# Patient Record
Sex: MALE | Race: WHITE | NOT HISPANIC OR LATINO | Employment: OTHER | URBAN - METROPOLITAN AREA
[De-identification: names, ages, dates, MRNs, and addresses within clinical notes are randomized per-mention and may not be internally consistent; named-entity substitution may affect disease eponyms.]

---

## 2017-01-27 ENCOUNTER — TELEPHONE (OUTPATIENT)
Dept: ENDOSCOPY | Facility: HOSPITAL | Age: 52
End: 2017-01-27

## 2017-01-27 DIAGNOSIS — Z12.11 SPECIAL SCREENING FOR MALIGNANT NEOPLASMS, COLON: Primary | ICD-10-CM

## 2017-01-27 RX ORDER — POLYETHYLENE GLYCOL 3350, SODIUM SULFATE ANHYDROUS, SODIUM BICARBONATE, SODIUM CHLORIDE, POTASSIUM CHLORIDE 236; 22.74; 6.74; 5.86; 2.97 G/4L; G/4L; G/4L; G/4L; G/4L
4 POWDER, FOR SOLUTION ORAL ONCE
Qty: 4000 ML | Refills: 0 | Status: SHIPPED | OUTPATIENT
Start: 2017-01-27 | End: 2017-01-27

## 2017-01-27 NOTE — TELEPHONE ENCOUNTER
Patient is scheduled for Colonoscopy 2/23/2017 with Dr. Dimas.  Instructions sent via mail.  Prep used: PEG.

## 2017-02-23 ENCOUNTER — ANESTHESIA (OUTPATIENT)
Dept: ENDOSCOPY | Facility: HOSPITAL | Age: 52
End: 2017-02-23
Payer: MEDICARE

## 2017-02-23 ENCOUNTER — SURGERY (OUTPATIENT)
Age: 52
End: 2017-02-23

## 2017-02-23 ENCOUNTER — ANESTHESIA EVENT (OUTPATIENT)
Dept: ENDOSCOPY | Facility: HOSPITAL | Age: 52
End: 2017-02-23
Payer: MEDICARE

## 2017-02-23 VITALS — RESPIRATION RATE: 16 BRPM

## 2017-02-23 PROBLEM — Z13.9 SCREENING: Status: ACTIVE | Noted: 2017-02-23

## 2017-02-23 PROCEDURE — 25000003 PHARM REV CODE 250: Performed by: NURSE ANESTHETIST, CERTIFIED REGISTERED

## 2017-02-23 PROCEDURE — D9220A PRA ANESTHESIA: Mod: PT,CRNA,, | Performed by: NURSE ANESTHETIST, CERTIFIED REGISTERED

## 2017-02-23 PROCEDURE — 63600175 PHARM REV CODE 636 W HCPCS: Performed by: NURSE ANESTHETIST, CERTIFIED REGISTERED

## 2017-02-23 PROCEDURE — D9220A PRA ANESTHESIA: Mod: PT,ANES,, | Performed by: ANESTHESIOLOGY

## 2017-02-23 RX ORDER — PROPOFOL 10 MG/ML
VIAL (ML) INTRAVENOUS
Status: DISCONTINUED | OUTPATIENT
Start: 2017-02-23 | End: 2017-02-23

## 2017-02-23 RX ORDER — PROPOFOL 10 MG/ML
VIAL (ML) INTRAVENOUS CONTINUOUS PRN
Status: DISCONTINUED | OUTPATIENT
Start: 2017-02-23 | End: 2017-02-23

## 2017-02-23 RX ORDER — LIDOCAINE HCL/PF 100 MG/5ML
SYRINGE (ML) INTRAVENOUS
Status: DISCONTINUED | OUTPATIENT
Start: 2017-02-23 | End: 2017-02-23

## 2017-02-23 RX ADMIN — PROPOFOL 100 MG: 10 INJECTION, EMULSION INTRAVENOUS at 08:02

## 2017-02-23 RX ADMIN — LIDOCAINE HYDROCHLORIDE 100 MG: 20 INJECTION, SOLUTION INTRAVENOUS at 08:02

## 2017-02-23 RX ADMIN — PROPOFOL 150 MCG/KG/MIN: 10 INJECTION, EMULSION INTRAVENOUS at 08:02

## 2017-02-23 NOTE — ANESTHESIA PREPROCEDURE EVALUATION
02/23/2017  Stevenson Robison is a 52 y.o., male.    [facility] Anesthesia Evaluation    I have reviewed the Patient Summary Reports.     I have reviewed the Medications.     Review of Systems  Anesthesia Hx:  No problems with previous Anesthesia  History of prior surgery of interest to airway management or planning:   Social:  Former Smoker    Cardiovascular:   Hypertension    Hepatic/GI:  Hepatic/GI Normal    Neurological:  Neurology Normal    Endocrine:  Endocrine Normal        Physical Exam  General:  Well nourished    Airway/Jaw/Neck:  Airway Findings: Mouth Opening: Small, but > 3cm Tongue: Normal  General Airway Assessment: Adult  Mallampati: III  Improves to III with phonation.  TM Distance: Normal, at least 6 cm      Dental:  Dental Findings: In tact        Mental Status:  Mental Status Findings:  Cooperative, Alert and Oriented         Anesthesia Plan  Type of Anesthesia, risks & benefits discussed:  Anesthesia Type:  general  Patient's Preference:   Intra-op Monitoring Plan: standard ASA monitors  Intra-op Monitoring Plan Comments:   Post Op Pain Control Plan:   Post Op Pain Control Plan Comments:   Induction:   IV  Beta Blocker:  Patient is not currently on a Beta-Blocker (No further documentation required).       Informed Consent: Patient understands risks and agrees with Anesthesia plan.  Questions answered. Anesthesia consent signed with patient.  ASA Score: 2     Day of Surgery Review of History & Physical:    H&P update referred to the surgeon.         Ready For Surgery From Anesthesia Perspective.

## 2017-02-23 NOTE — ANESTHESIA POSTPROCEDURE EVALUATION
"Anesthesia Post Evaluation    Patient: Stevenson Robison    Procedure(s) Performed: Procedure(s) (LRB):  COLONOSCOPY (N/A)    Final Anesthesia Type: general  Patient location during evaluation: GI PACU  Patient participation: Yes- Able to Participate  Level of consciousness: awake and alert  Post-procedure vital signs: reviewed and stable  Pain management: adequate  Airway patency: patent  PONV status at discharge: No PONV  Anesthetic complications: no      Cardiovascular status: stable  Respiratory status: unassisted and spontaneous ventilation  Hydration status: euvolemic  Follow-up not needed.        Visit Vitals    /80 (BP Location: Left arm, Patient Position: Sitting, BP Method: Automatic)    Pulse 74    Temp 37.1 °C (98.7 °F) (Oral)    Resp 12    Ht 5' 9" (1.753 m)    Wt 112.5 kg (248 lb)    SpO2 98%    BMI 36.62 kg/m2       Pain/Sudhakar Score: Pain Assessment Performed: Yes (2/23/2017  9:47 AM)  Presence of Pain: denies (2/23/2017  9:47 AM)  Pain Rating Prior to Med Admin: 0 (2/23/2017  9:47 AM)  Sudhakar Score: 10 (2/23/2017  9:47 AM)      "

## 2017-02-23 NOTE — TRANSFER OF CARE
"Anesthesia Transfer of Care Note    Patient: Stevenson Robison    Procedure(s) Performed: Procedure(s) (LRB):  COLONOSCOPY (N/A)    Patient location: GI    Anesthesia Type: MAC    Transport from OR: Transported from OR on room air with adequate spontaneous ventilation    Post pain: adequate analgesia    Post assessment: no apparent anesthetic complications    Post vital signs: stable    Level of consciousness: awake, alert and oriented    Nausea/Vomiting: no nausea/vomiting    Complications: none          Last vitals:   Visit Vitals    BP (!) 151/89 (Patient Position: Lying, BP Method: Automatic)    Pulse 79    Temp 36.6 °C (97.9 °F) (Oral)    Resp 16    Ht 5' 9" (1.753 m)    Wt 112.5 kg (248 lb)    SpO2 95%    BMI 36.62 kg/m2     "

## 2017-03-06 ENCOUNTER — HOSPITAL ENCOUNTER (OUTPATIENT)
Dept: RADIOLOGY | Facility: OTHER | Age: 52
Discharge: HOME | End: 2017-03-06
Attending: INTERNAL MEDICINE
Payer: MEDICARE

## 2017-03-06 ENCOUNTER — OFFICE VISIT (OUTPATIENT)
Dept: INTERNAL MEDICINE | Facility: CLINIC | Age: 52
End: 2017-03-06
Attending: INTERNAL MEDICINE
Payer: MEDICARE

## 2017-03-06 VITALS
HEART RATE: 93 BPM | DIASTOLIC BLOOD PRESSURE: 90 MMHG | OXYGEN SATURATION: 96 % | BODY MASS INDEX: 36.44 KG/M2 | HEIGHT: 69 IN | WEIGHT: 246.06 LBS | SYSTOLIC BLOOD PRESSURE: 130 MMHG

## 2017-03-06 DIAGNOSIS — M79.89 CYST OF SOFT TISSUE: ICD-10-CM

## 2017-03-06 DIAGNOSIS — M79.89 CYST OF SOFT TISSUE: Primary | ICD-10-CM

## 2017-03-06 DIAGNOSIS — R07.9 CHEST PAIN, UNSPECIFIED TYPE: ICD-10-CM

## 2017-03-06 PROCEDURE — 3080F DIAST BP >= 90 MM HG: CPT | Mod: S$GLB,,, | Performed by: INTERNAL MEDICINE

## 2017-03-06 PROCEDURE — 99999 PR PBB SHADOW E&M-EST. PATIENT-LVL III: CPT | Mod: PBBFAC,,, | Performed by: INTERNAL MEDICINE

## 2017-03-06 PROCEDURE — 76999 ECHO EXAMINATION PROCEDURE: CPT | Mod: TC

## 2017-03-06 PROCEDURE — 99214 OFFICE O/P EST MOD 30 MIN: CPT | Mod: S$GLB,,, | Performed by: INTERNAL MEDICINE

## 2017-03-06 PROCEDURE — 76604 US EXAM CHEST: CPT | Mod: 26,52,, | Performed by: RADIOLOGY

## 2017-03-06 PROCEDURE — 3075F SYST BP GE 130 - 139MM HG: CPT | Mod: S$GLB,,, | Performed by: INTERNAL MEDICINE

## 2017-03-06 PROCEDURE — 93010 ELECTROCARDIOGRAM REPORT: CPT | Mod: S$GLB,,, | Performed by: INTERNAL MEDICINE

## 2017-03-06 PROCEDURE — 99499 UNLISTED E&M SERVICE: CPT | Mod: S$GLB,,, | Performed by: INTERNAL MEDICINE

## 2017-03-06 PROCEDURE — 1160F RVW MEDS BY RX/DR IN RCRD: CPT | Mod: S$GLB,,, | Performed by: INTERNAL MEDICINE

## 2017-03-06 PROCEDURE — 93005 ELECTROCARDIOGRAM TRACING: CPT | Mod: S$GLB,,, | Performed by: INTERNAL MEDICINE

## 2017-03-06 RX ORDER — DIAZEPAM 5 MG/1
5 TABLET ORAL DAILY
Refills: 0 | COMMUNITY
Start: 2017-02-21 | End: 2017-03-06 | Stop reason: SDUPTHER

## 2017-03-06 NOTE — MR AVS SNAPSHOT
"    [facility]  [address]  [place] [address]  Phone: [phone]  Fax: [phone]                  Stevenson Robison JrJuliana   3/6/2017 1:40 PM   Office Visit    Description:  Male : 1965   Provider:  Ty Rodríguez MD   Department:  [facility]           Reason for Visit     Chest Pain           Diagnoses this Visit        Comments    Cyst of soft tissue    -  Primary     Chest pain, unspecified type                To Do List           Future Appointments        Provider Department Dept Phone    3/6/2017 3:00 PM [facility] USOP1 [facility] [phone]      Goals (5 Years of Data)     None      [facility]     [facility] Nurse Care Line -  Assistance  Registered nurses in the [facility] provide clinical advisement, health education, appointment booking, and other advisory services.  Call for this free service at [phone].             Medications                Verify that the below list of medications is an accurate representation of the medications you are currently taking.  If none reported, the list may be blank. If incorrect, please contact your healthcare provider. Carry this list with you in case of emergency.           Current Medications     diazepam (VALIUM) 10 MG Tab     hydrocodone-acetaminophen 10-325mg (NORCO)  mg Tab Take 1 tablet by mouth 3 (three) times daily as needed.    naproxen (NAPROSYN) 500 MG tablet Take 1 tablet (500 mg total) by mouth 2 (two) times daily with meals.    aspirin 81 MG Chew Take 81 mg by mouth once daily.           Clinical Reference Information           Your Vitals Were     BP Pulse Height Weight SpO2 BMI    130/90 93 5' 9" (1.753 m) 111.6 kg (246 lb 0.5 oz) 96% 36.33 kg/m2      Blood Pressure          Most Recent Value    BP  (!)  130/90      Allergies as of 3/6/2017     Other Henrietta-3s      Immunizations Administered on Date of Encounter - 3/6/2017     None    "   Orders Placed During Today's Visit      Normal Orders This Visit    Ambulatory referral to General Surgery     IN OFFICE EKG 12-LEAD (to [place])     Future Labs/Procedures Expected by Expires    US Soft Tissue Misc  3/6/2017 3/6/2018      [facility] Sign-Up     Activating your [facility] account is as easy as 1-2-3!     1) Visit [email or web address], select Sign Up Now, enter this activation code and your date of birth, then select Next.  [email or web address]  Expires: 4/9/2017  9:46 AM      2) Create a username and password to use when you visit [facility] in the future and select a security question in case you lose your password and select Next.    3) Enter your e-mail address and click Sign Up!    Additional Information  If you have questions, please e-mail [email or web address] or call [phone] to talk to our [facility] staff. Remember, [facility] is NOT to be used for urgent needs. For medical emergencies, dial 911.         Language Assistance Services     ATTENTION: Language assistance services are available, free of charge. Please call [phone].      ATENCIÓN: Si habla español, tiene a caban disposición servicios gratuitos de asistencia lingüística. Llame al [phone].     CHÚ Ý: N?u b?n nói Ti?ng Vi?t, có các d?ch v? h? tr? ngôn ng? mi?n phí dành cho b?n. G?i s? [phone].         [facility] complies with applicable Federal civil rights laws and does not discriminate on the basis of race, color, national origin, age, disability, or sex.

## 2017-03-06 NOTE — PROGRESS NOTES
"Subjective:       Patient ID: Stevenson Robison Jr. is a 52 y.o. male.    Chief Complaint: Chest Pain    HPI Comments: Here for urgent visit    Lump on center of chest for 12 month and one to left chest for 6 months. The left one has been tender and painful when palpated for the past 2-3 months. Increased GERD for past 1-2 weeks. Frequent HA for past week.Starts bilateral neck and radiates to bilateral temporal region.  Started taking naproxen for low back pain in attempt to get away from opiates. Has been getting good relief with naproxen. Father with MI at age 55.         Review of Systems    Objective:      Vitals:    03/06/17 1400   BP: (!) 130/90   Pulse: 93   SpO2: 96%   Weight: 111.6 kg (246 lb 0.5 oz)   Height: 5' 9" (1.753 m)      Physical Exam   Constitutional: He is oriented to person, place, and time. He appears well-developed and well-nourished. He does not have a sickly appearance. No distress.   HENT:   Head: Normocephalic and atraumatic.   Mouth/Throat: Oropharynx is clear and moist. No oropharyngeal exudate.   Eyes: Conjunctivae and EOM are normal. Pupils are equal, round, and reactive to light. Right eye exhibits no discharge. Left eye exhibits no discharge. No scleral icterus.   Neck: No thyromegaly present.   Cardiovascular: Normal rate, regular rhythm and normal heart sounds.    No murmur heard.  Pulmonary/Chest: Effort normal and breath sounds normal. No respiratory distress. He has no wheezes. He has no rales.   2 well circumscribed, firm, rubbery lesions most consistent with lipoma.   Abdominal: Soft. Normal appearance. He exhibits no distension. There is no tenderness.   Musculoskeletal: He exhibits no edema or tenderness.   Lymphadenopathy:     He has no cervical adenopathy.   Neurological: He is alert and oriented to person, place, and time.   Skin: Skin is warm and dry. He is not diaphoretic.   Psychiatric: He has a normal mood and affect. His speech is normal and behavior is normal.     "   Assessment:       1. Cyst of soft tissue    2. Chest pain, unspecified type        Plan:       Stevenson was seen today for chest pain.    Diagnoses and all orders for this visit:    Cyst of soft tissue  -     Ambulatory referral to General Surgery  -      Soft Tissue Misc; Future    Chest pain, unspecified type  -atypical. Likely due to worsening GERD 2/2 more frequent NSAID use. ED prompts discussed  -     IN OFFICE EKG 12-LEAD (to [place])             Side effects of medication(s) were discussed in detail and patient voiced understanding.  Patient will call back for any issues or complications.

## 2017-03-21 ENCOUNTER — TELEPHONE (OUTPATIENT)
Dept: SURGERY | Facility: CLINIC | Age: 52
End: 2017-03-21

## 2017-03-21 NOTE — TELEPHONE ENCOUNTER
----- Message from Amparo Vitale MD sent at 3/20/2017  4:03 PM CDT -----  We should put this patient with on of the folks that does general surgery at [facility] because I don't.  (Billie Mercado, Goldberg, Jelani Vernon).    Thanks,    aob

## 2017-04-05 ENCOUNTER — OFFICE VISIT (OUTPATIENT)
Dept: SURGERY | Facility: CLINIC | Age: 52
End: 2017-04-05
Attending: INTERNAL MEDICINE
Payer: MEDICARE

## 2017-04-05 VITALS
WEIGHT: 246 LBS | TEMPERATURE: 98 F | HEIGHT: 69 IN | HEART RATE: 79 BPM | DIASTOLIC BLOOD PRESSURE: 84 MMHG | BODY MASS INDEX: 36.43 KG/M2 | SYSTOLIC BLOOD PRESSURE: 135 MMHG

## 2017-04-05 DIAGNOSIS — M79.89 MASS OF SOFT TISSUE: Primary | ICD-10-CM

## 2017-04-05 PROCEDURE — 99999 PR PBB SHADOW E&M-EST. PATIENT-LVL III: CPT | Mod: PBBFAC,,, | Performed by: SURGERY

## 2017-04-05 PROCEDURE — 3079F DIAST BP 80-89 MM HG: CPT | Mod: S$GLB,,, | Performed by: SURGERY

## 2017-04-05 PROCEDURE — 3075F SYST BP GE 130 - 139MM HG: CPT | Mod: S$GLB,,, | Performed by: SURGERY

## 2017-04-05 PROCEDURE — 99202 OFFICE O/P NEW SF 15 MIN: CPT | Mod: S$GLB,,, | Performed by: SURGERY

## 2017-04-05 PROCEDURE — 1160F RVW MEDS BY RX/DR IN RCRD: CPT | Mod: S$GLB,,, | Performed by: SURGERY

## 2017-04-05 NOTE — PROGRESS NOTES
History & Physical    SUBJECTIVE:     History of Present Illness:  Patient is a 52 y.o. male presents with tender soft tissue masses left anterior chest and left lateral chest wall. Onset of symptoms was abrupt starting several months ago with gradually worsening course since that time. Patient denies redness. Symptoms are aggravated by direct contact. Symptoms improve with .      Chief Complaint   Patient presents with    Consult    Lipoma       Review of patient's allergies indicates:   Allergen Reactions    Other omega-3s Hives     Pt reports allergic to either an anti-inflammatory or antibiotic. PT unsure of name       Current Outpatient Prescriptions   Medication Sig Dispense Refill    aspirin 81 MG Chew Take 81 mg by mouth once daily.      hydrocodone-acetaminophen 10-325mg (NORCO)  mg Tab Take 1 tablet by mouth 3 (three) times daily as needed.      naproxen (NAPROSYN) 500 MG tablet Take 1 tablet (500 mg total) by mouth 2 (two) times daily with meals. 20 tablet 0     No current facility-administered medications for this visit.        Past Medical History:   Diagnosis Date    Hypertension      Past Surgical History:   Procedure Laterality Date    BACK SURGERY      COLONOSCOPY N/A 2/23/2017    Procedure: COLONOSCOPY;  Surgeon: Wil Dimas MD;  Location: [address];  Service: Endoscopy;  Laterality: N/A;    decompression neck  2005    HERNIA REPAIR  2008    NASAL SEPTUM SURGERY  2005     Family History   Problem Relation Age of Onset    Heart disease Father     Heart attack Father      Social History   Substance Use Topics    Smoking status: Former Smoker    Smokeless tobacco: None    Alcohol use No        Review of Systems:  Review of Systems   Constitutional: Negative.    HENT: Negative.    Eyes: Negative.    Respiratory: Negative.    Cardiovascular: Negative.    Gastrointestinal: Negative.    Genitourinary: Negative.    Allergic/Immunologic: Negative.    Neurological:  "Negative.    Hematological: Negative.    Psychiatric/Behavioral: Negative.        OBJECTIVE:     Vital Signs (Most Recent)  Temp: 98 °F (36.7 °C) (04/05/17 1030)  Pulse: 79 (04/05/17 1030)  BP: 135/84 (04/05/17 1030)  5' 9" (1.753 m)  111.6 kg (246 lb)     Physical Exam:  Physical Exam   Constitutional: He is oriented to person, place, and time. He appears well-developed and well-nourished.   HENT:   Head: Normocephalic and atraumatic.   Eyes: EOM are normal. Pupils are equal, round, and reactive to light.   Neck: Normal range of motion. Neck supple.   Cardiovascular: Normal rate and regular rhythm.    Pulmonary/Chest: Effort normal and breath sounds normal.   Abdominal: Soft. Bowel sounds are normal.   Musculoskeletal: Normal range of motion.   Neurological: He is alert and oriented to person, place, and time.   Skin:   Tender soft tissue masses:  2cm medial asopect of left chest wall near breast    And lateral chest wall  1 cm.   Psychiatric: He has a normal mood and affect.       Laboratory  none    Diagnostic Results:  none    ASSESSMENT/PLAN:     Soft tissue mass x 2 chest wall    PLAN:Plan     Excision under local anesthesia       "

## 2017-04-05 NOTE — LETTER
April 5, 2017      Ty Rodríguez MD  [address]  [address]  [facility] [address]           [facility]  [address]  [place] [address]  Phone: [phone]          Patient: Stevenson Robison Jr.   MR Number: [id number]   YOB: 1965   Date of Visit: 4/5/2017       Dear Dr. Ty Rodríguez:    Thank you for referring Stevenson Robison to me for evaluation. Attached you will find relevant portions of my assessment and plan of care.    If you have questions, please do not hesitate to call me. I look forward to following Stevenson Robison along with you.    Sincerely,    Martín Wise MD    Enclosure  CC:  No Recipients    If you would like to receive this communication electronically, please contact [email or web address] or [phone] to request more information on [employer] access.    For providers and/or their staff who would like to refer a patient to [facility], please contact us through our one-stop-shop provider referral line, [facility] [place], at [phone].    If you feel you have received this communication in error or would no longer like to receive these types of communications, please e-mail [email or web address]

## 2017-04-24 ENCOUNTER — PROCEDURE VISIT (OUTPATIENT)
Dept: SURGERY | Facility: CLINIC | Age: 52
End: 2017-04-24
Payer: MEDICARE

## 2017-04-24 VITALS
HEART RATE: 71 BPM | HEIGHT: 69 IN | WEIGHT: 245 LBS | DIASTOLIC BLOOD PRESSURE: 103 MMHG | SYSTOLIC BLOOD PRESSURE: 152 MMHG | TEMPERATURE: 98 F | BODY MASS INDEX: 36.29 KG/M2

## 2017-04-24 DIAGNOSIS — M79.89 SOFT TISSUE MASS: Primary | ICD-10-CM

## 2017-04-24 PROCEDURE — 88304 TISSUE EXAM BY PATHOLOGIST: CPT | Performed by: PATHOLOGY

## 2017-04-24 PROCEDURE — 21552 EXC NECK LES SC 3 CM/>: CPT | Mod: ,,, | Performed by: SURGERY

## 2017-04-24 NOTE — PROCEDURES
"Stevenson Robison Jr. is a 52 y.o. male patient.    Temp: 98.3 °F (36.8 °C) (04/24/17 0924)  Pulse: 71 (04/24/17 0924)  BP: (!) 152/103 (04/24/17 0924)  Weight: 111.1 kg (245 lb) (04/24/17 0924)  Height: 5' 9" (175.3 cm) (04/24/17 0924)       Procedures  PREOPERATIVE DIAGNOSIS: Lipoma x2   POSTOPERATIVE DIAGNOSIS: Lipoma x2   PROCEDURE PERFORMED: Lipoma Removal x 2     ATTENDING SURGEON: Martín Wise MD      HOUSESTAFF SURGEON: Martín Wise MD     ANESTHESIA:  Local    ESTIMATED BLOOD LOSS: 2cc    FINDINGS: Lipoma 2cm on anterior chest and small <1cm     INDICATIONS: Stevenson Robison Jr. is a 52 y.o. male referred to my General Surgery Clinic with soft tissue mass in the anterior and left lateral chest.  We recommended excisional biopsy.  This procedure has been fully reviewed with the patient and written informed consent has been obtained.      PROCEDURE IN DETAIL: The patient was identified and brought back to minor procedure room in the General Surgery Clinic. Patient was positioned appropriately and prepped and draped sterilely. Local anesthesia was administered and a 2.5cm skin incision was made with the scalpel on anterior chest. Subcutaneous tissue was divided sharply and the lipoma was sharply excised away from the normal surrounding subcutaneous tissue sharply, was passed off the field as specimen. Hemostasis was obtained. The wound was closed in layers including deep and superficial on the skin with vicryl and monocryl respectfully. A sterile dressing was applied.     Next attention was made to the left lateral chest where local anesthesia was administered and a 1.5cm skin incision was made with a scalpel. Subcutaneous tissue was divided with electrocautery and several small hard subcutaneous lipomatous tissue was removed and passed off the field.  Hemostasis was obtained. The wound was closed in layers including deep and superficial on the skin with vicryl and monocryl respectfully. A sterile " dressing was applied.     The patient tolerated the procedure well, was discharged from the minor procedure room and will follow up in two weeks.            Agustin Recio  4/24/2017

## 2017-05-03 ENCOUNTER — TELEPHONE (OUTPATIENT)
Dept: SURGERY | Facility: CLINIC | Age: 52
End: 2017-05-03

## 2017-05-03 NOTE — TELEPHONE ENCOUNTER
"Pt called to report that he has an enlarging, painful "lump" on his left side at the excision site of a soft tissue mass removal on 4/24/17 in the Minor Procedure Room.  He will come tomorrow 5/4/17 at 10am for a wound check with Rocio Barbosa PA-C.  He is aware of appointment date and time.  "

## 2017-05-04 ENCOUNTER — OFFICE VISIT (OUTPATIENT)
Dept: SURGERY | Facility: CLINIC | Age: 52
End: 2017-05-04
Payer: MEDICARE

## 2017-05-04 VITALS
HEART RATE: 72 BPM | TEMPERATURE: 98 F | HEIGHT: 69 IN | BODY MASS INDEX: 37.18 KG/M2 | SYSTOLIC BLOOD PRESSURE: 140 MMHG | WEIGHT: 251 LBS | DIASTOLIC BLOOD PRESSURE: 88 MMHG

## 2017-05-04 DIAGNOSIS — D17.1 LIPOMA OF TORSO: Primary | ICD-10-CM

## 2017-05-04 PROCEDURE — 99024 POSTOP FOLLOW-UP VISIT: CPT | Mod: S$GLB,,, | Performed by: PHYSICIAN ASSISTANT

## 2017-05-04 PROCEDURE — 99999 PR PBB SHADOW E&M-EST. PATIENT-LVL III: CPT | Mod: PBBFAC,,, | Performed by: PHYSICIAN ASSISTANT

## 2017-05-04 RX ORDER — TRAZODONE HYDROCHLORIDE 50 MG/1
TABLET ORAL
Refills: 3 | COMMUNITY
Start: 2017-04-21 | End: 2023-07-18

## 2017-05-04 NOTE — PROGRESS NOTES
Stevenson Robison Jr.  52 y.o.    No diagnosis found.    SUBJECTIVE:  52 y.o.male presents s/p Excision of lipomas.  Patient reports that area is healing well, other than swelling noted to right flank. States he feels pressure at the incision site.  Denies pain, drainage, fever or chills.     OBJECTIVE:  Right flank - incision healing well, no erythema, no drainage, no tenderness with palpation; small seroma palpated, no signs or symptoms of infection  Mid chest -  incision healing well, no erythema, no drainage, no tenderness with palpation    Pathology- reviewed  FINAL PATHOLOGIC DIAGNOSIS  1. SOFT TISSUE, LEFT CHEST, EXCISION:  Benign mature adipose tissue consistent with lipoma  No evidence of malignancy  2. SOFT TISSUE, LEFT MIDDLE CHEST, EXCISION:  Benign mature adipose tissue consistent with lipoma  No evidence of malignancy    ASSESSMENT:  51 yo male s/p lipoma removals    PLAN:  Recommend ibuprofen for pain at flank site if any; heating pad as needed  Patient doing well after removal  Return to clinic prn.

## 2017-07-26 ENCOUNTER — TELEPHONE (OUTPATIENT)
Dept: INTERNAL MEDICINE | Facility: CLINIC | Age: 52
End: 2017-07-26

## 2017-07-26 ENCOUNTER — OFFICE VISIT (OUTPATIENT)
Dept: INTERNAL MEDICINE | Facility: CLINIC | Age: 52
End: 2017-07-26
Attending: INTERNAL MEDICINE
Payer: MEDICARE

## 2017-07-26 ENCOUNTER — HOSPITAL ENCOUNTER (OUTPATIENT)
Dept: RADIOLOGY | Facility: OTHER | Age: 52
Discharge: HOME | End: 2017-07-26
Attending: INTERNAL MEDICINE
Payer: MEDICARE

## 2017-07-26 VITALS
HEART RATE: 71 BPM | DIASTOLIC BLOOD PRESSURE: 94 MMHG | SYSTOLIC BLOOD PRESSURE: 140 MMHG | HEIGHT: 69 IN | WEIGHT: 243.63 LBS | BODY MASS INDEX: 36.08 KG/M2

## 2017-07-26 DIAGNOSIS — R21 RASH: ICD-10-CM

## 2017-07-26 DIAGNOSIS — R06.02 SOB (SHORTNESS OF BREATH): ICD-10-CM

## 2017-07-26 DIAGNOSIS — H93.13 TINNITUS, BILATERAL: Primary | ICD-10-CM

## 2017-07-26 PROCEDURE — 93005 ELECTROCARDIOGRAM TRACING: CPT | Mod: S$GLB,,, | Performed by: INTERNAL MEDICINE

## 2017-07-26 PROCEDURE — 93010 ELECTROCARDIOGRAM REPORT: CPT | Mod: S$GLB,,, | Performed by: INTERNAL MEDICINE

## 2017-07-26 PROCEDURE — 71020 XR CHEST PA AND LATERAL: CPT | Mod: TC

## 2017-07-26 PROCEDURE — 71020 XR CHEST PA AND LATERAL: CPT | Mod: 26,,, | Performed by: RADIOLOGY

## 2017-07-26 PROCEDURE — 99999 PR PBB SHADOW E&M-EST. PATIENT-LVL III: CPT | Mod: PBBFAC,,, | Performed by: INTERNAL MEDICINE

## 2017-07-26 PROCEDURE — 99214 OFFICE O/P EST MOD 30 MIN: CPT | Mod: S$GLB,,, | Performed by: INTERNAL MEDICINE

## 2017-07-26 RX ORDER — PREDNISONE 10 MG/1
TABLET ORAL
Qty: 30 TABLET | Refills: 0 | Status: SHIPPED | OUTPATIENT
Start: 2017-07-26 | End: 2017-09-12

## 2017-07-26 NOTE — PROGRESS NOTES
"Subjective:       Patient ID: Stevenson Robison Jr. is a 52 y.o. male.    Chief Complaint: Rash (on arm, under arm ) and Breathing Problem (when sleep)    Here for urgent visit    1 month Hx of rash of bilateral proximal arms and back of neck. Improves with benadryl.    3 day hx of difficulty breathing, feels like he has weight on his chest and is frequently coughing to try and clear. He is very nervous about symptoms. He states feeling has been constant, has never gone away, no waxing or waning, no alleviating or aggravating factors specifically with movements, exertion, or PO intake. He does not do much but did go fishing for red fish two nights ago and did not have any worsening of symptoms. He denies facial pressure, watery itchy eyes, frequent sneezing, sore throat heartburn, recent travel, leg pain or swelling.           Review of Systems    Objective:      Vitals:    07/26/17 1508   BP: (!) 140/94   Pulse: 71   Weight: 110.5 kg (243 lb 9.7 oz)   Height: 5' 9" (1.753 m)      Physical Exam   Skin:            Assessment:       1. Tinnitus, bilateral    2. SOB (shortness of breath)    3. Rash        Plan:       Stevenson was seen today for rash and breathing problem.    Diagnoses and all orders for this visit:    Tinnitus, bilateral  -     Ambulatory Referral to ENT    SOB (shortness of breath)  -EKG in office while symptoms present, EKG unchanged.  -     IN OFFICE EKG 12-LEAD (to [place])  -     X-Ray Chest PA And Lateral; Future  -     D dimer, quantitative; Future    Rash  -     predniSONE (DELTASONE) 10 MG tablet; 3 tabs for 3 days then 2 tabs for 3 days             Side effects of medication(s) were discussed in detail and patient voiced understanding.  Patient will call back for any issues or complications.   "

## 2017-08-30 PROBLEM — E66.01 SEVERE OBESITY (BMI 35.0-35.9 WITH COMORBIDITY): Status: ACTIVE | Noted: 2017-08-30

## 2017-08-30 PROBLEM — Z13.9 SCREENING: Status: RESOLVED | Noted: 2017-02-23 | Resolved: 2017-08-30

## 2017-09-12 ENCOUNTER — OFFICE VISIT (OUTPATIENT)
Dept: INTERNAL MEDICINE | Facility: CLINIC | Age: 52
End: 2017-09-12
Attending: INTERNAL MEDICINE
Payer: MEDICARE

## 2017-09-12 ENCOUNTER — HOSPITAL ENCOUNTER (OUTPATIENT)
Dept: PULMONOLOGY | Facility: OTHER | Age: 52
Discharge: HOME | End: 2017-09-12
Attending: INTERNAL MEDICINE
Payer: MEDICARE

## 2017-09-12 VITALS
HEART RATE: 53 BPM | BODY MASS INDEX: 36.3 KG/M2 | DIASTOLIC BLOOD PRESSURE: 82 MMHG | SYSTOLIC BLOOD PRESSURE: 126 MMHG | WEIGHT: 245.81 LBS

## 2017-09-12 DIAGNOSIS — L24.9 IRRITANT DERMATITIS: Primary | ICD-10-CM

## 2017-09-12 DIAGNOSIS — J45.30 MILD PERSISTENT ASTHMA WITHOUT COMPLICATION: ICD-10-CM

## 2017-09-12 PROCEDURE — 25000242 PHARM REV CODE 250 ALT 637 W/ HCPCS

## 2017-09-12 PROCEDURE — 99214 OFFICE O/P EST MOD 30 MIN: CPT | Mod: S$GLB,,, | Performed by: INTERNAL MEDICINE

## 2017-09-12 PROCEDURE — 99999 PR PBB SHADOW E&M-EST. PATIENT-LVL III: CPT | Mod: PBBFAC,,, | Performed by: INTERNAL MEDICINE

## 2017-09-12 PROCEDURE — 99499 UNLISTED E&M SERVICE: CPT | Mod: S$PBB,,, | Performed by: INTERNAL MEDICINE

## 2017-09-12 PROCEDURE — 94729 DIFFUSING CAPACITY: CPT

## 2017-09-12 PROCEDURE — 94060 EVALUATION OF WHEEZING: CPT

## 2017-09-12 PROCEDURE — 3074F SYST BP LT 130 MM HG: CPT | Mod: S$GLB,,, | Performed by: INTERNAL MEDICINE

## 2017-09-12 PROCEDURE — 3008F BODY MASS INDEX DOCD: CPT | Mod: S$GLB,,, | Performed by: INTERNAL MEDICINE

## 2017-09-12 PROCEDURE — 3079F DIAST BP 80-89 MM HG: CPT | Mod: S$GLB,,, | Performed by: INTERNAL MEDICINE

## 2017-09-12 PROCEDURE — 94727 GAS DIL/WSHOT DETER LNG VOL: CPT

## 2017-09-12 RX ORDER — TRIAMCINOLONE ACETONIDE 1 MG/G
CREAM TOPICAL 2 TIMES DAILY
Qty: 45 G | Refills: 1 | Status: SHIPPED | OUTPATIENT
Start: 2017-09-12 | End: 2023-03-17 | Stop reason: SDUPTHER

## 2017-09-12 RX ORDER — FLUTICASONE PROPIONATE AND SALMETEROL 100; 50 UG/1; UG/1
1 POWDER RESPIRATORY (INHALATION) 2 TIMES DAILY
Qty: 60 EACH | Refills: 2 | Status: SHIPPED | OUTPATIENT
Start: 2017-09-12 | End: 2017-10-03 | Stop reason: SDUPTHER

## 2017-09-12 NOTE — PROGRESS NOTES
Subjective:       Patient ID: Stevenson Robison Jr. is a 52 y.o. male.    Chief Complaint: Rash and Cough    Here for f/u    Given course of prednisone for rash of bilateral shoulders. He states this did not affect the rash but his cough and chest tightness resolved while taking steroids but then shortly returned. He has made numerous changes to soaps and detergents. He is not using his usual hair gel.         Review of Systems    Objective:      Vitals:    09/12/17 1404   BP: 126/82   Pulse: (!) 53   Weight: 111.5 kg (245 lb 13 oz)      Physical Exam   Constitutional: He is oriented to person, place, and time. He appears well-developed and well-nourished. He does not have a sickly appearance. No distress.   HENT:   Head: Normocephalic and atraumatic.   Eyes: Conjunctivae and EOM are normal. Right eye exhibits no discharge. Left eye exhibits no discharge. No scleral icterus.   Pulmonary/Chest: Effort normal. No respiratory distress.   Abdominal: Normal appearance. He exhibits no distension.   Neurological: He is alert and oriented to person, place, and time.   Skin: Skin is warm and dry. He is not diaphoretic.   Faint, sparse erythematous maculopapular rash involving bilateral shoulder, proximal arms and extending down back.    Psychiatric: He has a normal mood and affect. His speech is normal.       Assessment:       1. Irritant dermatitis    2. Mild persistent asthma without complication        Plan:       Stevenson was seen today for rash and cough.    Diagnoses and all orders for this visit:    Irritant dermatitis  -derm if rash does not resolve with 2-4 week course of topical steroids  -     Ambulatory referral to Dermatology  -     triamcinolone acetonide 0.1% (KENALOG) 0.1 % cream; Apply topically 2 (two) times daily.    Mild persistent asthma without complication  -start advair for suspected asthma. pFTs to confirm  -     Complete PFT with bronchodilator; Future  -     fluticasone-salmeterol 100-50 mcg/dose  (ADVAIR) 100-50 mcg/dose diskus inhaler; Inhale 1 puff into the lungs 2 (two) times daily. Controller    RTC in 3 months or sooner prn                        Side effects of medication(s) were discussed in detail and patient voiced understanding.  Patient will call back for any issues or complications.

## 2017-09-13 ENCOUNTER — TELEPHONE (OUTPATIENT)
Dept: INTERNAL MEDICINE | Facility: CLINIC | Age: 52
End: 2017-09-13

## 2017-09-13 NOTE — TELEPHONE ENCOUNTER
Please let patient know his lung function test are consistent with asthma and to continue advair and RTC as previously discussed.

## 2017-09-13 NOTE — PROCEDURES
HISTORY OF PRESENT ILLNESS:  The patient is a 52-year-old male, 69 inches tall,   243 pounds with a diagnosis of mild persistent asthma.    The patient has a forced vital capacity of 3.08 L, which is 66% of predicted.    FEV1 is 2.51, which is 73% of predicted.  FEV1/FVC is 112% of predicted.  FEF   25-75 is 82% of predicted.  There is a 64% response to bronchodilators in the   large airways and 77% in the small airways.  Lung volumes are normal.  There is   no evidence of diffusion defect.  Flow volume loop is consistent with above.  Of   note, there is a flattening on the inspiratory limb, which can be seen with   difficulty with the procedure, muscular weakness, or much less likely a fixed   extrathoracic obstruction.    ASSESSMENT:  1.  No technical spirometric evidence of obstruction, but markedly significant   response to bronchodilators consistent with asthmatic response.  2.  No evidence of diffusing capacity.  3.  Mild restrictive ventilatory defect by forced vital capacity criteria.  4.  Flow volume loop discussed above.      CCS/DILLON  dd: 09/12/2017 19:51:29 (CDT)  td: 09/13/2017 00:47:11 (CDT)  Doc ID   #[id number]  Job ID #[id number]    CC: YOSELYN RIVERA M.D.

## 2017-09-13 NOTE — TELEPHONE ENCOUNTER
Pt would like to know test results from his Complete PFT on yesterday.      Please advise. Thanks.

## 2017-09-13 NOTE — TELEPHONE ENCOUNTER
----- Message from Negin Graham sent at 9/13/2017  4:31 PM CDT -----  Contact: pt  _  1st Request  _  2nd Request  _  3rd Request        Who: pt    Why: pt needs his lung test results. Please call pt     What Number to Call Back:[phone]    When to Expect a call back: (With in 24 hours)

## 2017-09-14 NOTE — TELEPHONE ENCOUNTER
----- Message from Aydin Rivero sent at 9/14/2017 10:44 AM CDT -----  Contact: Stevenson Robison  X_  1st Request  _  2nd Request  _  3rd Request        Who: Stevenson Robison    Why: Patient returning phone call about his test results. Please call back to follow up.    What Number to Call Back: [phone]    When to Expect a call back: (With in 24 hours)

## 2017-09-14 NOTE — TELEPHONE ENCOUNTER
----- Message from Josefa Murphy sent at 9/14/2017 10:30 AM CDT -----  Contact: BRANDEE MAR JR. [[id number]]  x_  1st Request  _  2nd Request  _  3rd Request        Who: BRANDEE MAR JR. [[id number]]    Why: returning call     What Number to Call Back: [phone]    When to Expect a call back: (With in 24 hours)

## 2017-10-03 RX ORDER — FLUTICASONE PROPIONATE AND SALMETEROL 100; 50 UG/1; UG/1
1 POWDER RESPIRATORY (INHALATION) 2 TIMES DAILY
Qty: 180 EACH | Refills: 2 | Status: SHIPPED | OUTPATIENT
Start: 2017-10-03 | End: 2017-12-21

## 2017-10-03 NOTE — TELEPHONE ENCOUNTER
----- Message from Aydin Rivero sent at 10/3/2017  2:59 PM CDT -----  Contact: Marina from [facility]      _X  1st Request  _  2nd Request  _  3rd Request    Please refill the medication(s) listed below. Please call the patient when the prescription(s) is ready for  at this phone number    Patient would like a 90 day supply            Medication #1    fluticasone-salmeterol 100-50 mcg/dose (ADVAIR) 100-50 mcg/dose diskus inhaler 60 each 2 9/12/2017 9/12/2018 --  Sig - Route: Inhale 1 puff into the lungs 2 (two) times daily. Controller - Inhalation  Class: Normal  Order: [id number]  Date/Time Signed: 9/12/2017 14:29      E-Prescribing Status: Receipt confirmed by pharmacy (9/12/2017  2:29 PM CDT)        Medication #2      Preferred Pharmacy:    [facility]/pharmacy #[id number] - [place], [place] - [address] [phone] (Phone)  [phone] (Fax)

## 2017-10-06 ENCOUNTER — TELEPHONE (OUTPATIENT)
Dept: INTERNAL MEDICINE | Facility: CLINIC | Age: 52
End: 2017-10-06

## 2017-10-06 NOTE — TELEPHONE ENCOUNTER
----- Message from Munir Malone sent at 10/6/2017  4:31 PM CDT -----  Contact: Patient   _x  1st Request  _  2nd Request  _  3rd Request        Who: BRANDEE MAR JR. [[id number]]    Why: Requesting a call back in regards to side effect he is experiencing from medication fluticasone-salmeterol 100-50 mcg/dose (ADVAIR) 100-50 mcg/dose diskus inhaler. Pt states that he is having complications breathing and a rash. Pt needs advice ASAP !   Please return the call at earliest convenience. Thanks!     What Number to Call Back:[phone]          When to Expect a call back: (Within 24 hours)

## 2017-10-06 NOTE — TELEPHONE ENCOUNTER
"Patient has been on Advair for a little more than 3 weeks. 5 days ago, he reports redness and itching on medial aspect of bilateral legs from ankles to mid-leg. He started having back discomfort which turned into a productive cough with "brownish" sputum. Denies fever. States during the lung function test he had, he was given an Albuterol breathing treatment, which worked very well and he had no complications. He is asking if he should discontinue the Advair and if he can be prescribed Albuterol. The patient was advised if he was having trouble breathing, that he should proceed to the emergency room. He verbalized understanding and had no further questions.  "

## 2017-10-07 RX ORDER — ALBUTEROL SULFATE 90 UG/1
2 AEROSOL, METERED RESPIRATORY (INHALATION) EVERY 6 HOURS PRN
Qty: 1 EACH | Refills: 11 | Status: SHIPPED | OUTPATIENT
Start: 2017-10-07 | End: 2021-12-08 | Stop reason: SDUPTHER

## 2017-10-07 NOTE — TELEPHONE ENCOUNTER
"Rec continue advair as this is maintenance inhaler and is to be taken daily. Albuterol is to be used as "rescue inhaler" to help when acutely short of breath or wheezing. F/U in clinic to discuss further. F/U sooner if current symptoms do not improve.  Will send in refill of albuterol. Please review  "

## 2017-11-24 ENCOUNTER — HOSPITAL ENCOUNTER (EMERGENCY)
Facility: OTHER | Age: 52
Discharge: HOME | End: 2017-11-25
Attending: EMERGENCY MEDICINE
Payer: MEDICARE

## 2017-11-24 DIAGNOSIS — R60.0 BILATERAL LOWER EXTREMITY EDEMA: Primary | ICD-10-CM

## 2017-11-24 DIAGNOSIS — R07.9 CHEST PAIN: ICD-10-CM

## 2017-11-24 LAB
ALBUMIN SERPL BCP-MCNC: 3.9 G/DL
ALP SERPL-CCNC: 74 U/L
ALT SERPL W/O P-5'-P-CCNC: 27 U/L
ANION GAP SERPL CALC-SCNC: 7 MMOL/L
AST SERPL-CCNC: 23 U/L
BASOPHILS # BLD AUTO: 0.02 K/UL
BASOPHILS NFR BLD: 0.3 %
BILIRUB SERPL-MCNC: 0.4 MG/DL
BNP SERPL-MCNC: 30 PG/ML
BUN SERPL-MCNC: 10 MG/DL
CALCIUM SERPL-MCNC: 9.3 MG/DL
CHLORIDE SERPL-SCNC: 104 MMOL/L
CO2 SERPL-SCNC: 31 MMOL/L
CREAT SERPL-MCNC: 0.8 MG/DL
DIFFERENTIAL METHOD: ABNORMAL
EOSINOPHIL # BLD AUTO: 0.2 K/UL
EOSINOPHIL NFR BLD: 2.3 %
ERYTHROCYTE [DISTWIDTH] IN BLOOD BY AUTOMATED COUNT: 12.7 %
EST. GFR  (AFRICAN AMERICAN): >60 ML/MIN/1.73 M^2
EST. GFR  (NON AFRICAN AMERICAN): >60 ML/MIN/1.73 M^2
GLUCOSE SERPL-MCNC: 92 MG/DL
HCT VFR BLD AUTO: 42 %
HGB BLD-MCNC: 14.1 G/DL
LYMPHOCYTES # BLD AUTO: 2.4 K/UL
LYMPHOCYTES NFR BLD: 36.7 %
MCH RBC QN AUTO: 30.8 PG
MCHC RBC AUTO-ENTMCNC: 33.6 G/DL
MCV RBC AUTO: 92 FL
MONOCYTES # BLD AUTO: 0.6 K/UL
MONOCYTES NFR BLD: 9.4 %
NEUTROPHILS # BLD AUTO: 3.3 K/UL
NEUTROPHILS NFR BLD: 51 %
PLATELET # BLD AUTO: 262 K/UL
PMV BLD AUTO: 9.7 FL
POTASSIUM SERPL-SCNC: 3.9 MMOL/L
PROT SERPL-MCNC: 7.2 G/DL
RBC # BLD AUTO: 4.58 M/UL
SODIUM SERPL-SCNC: 142 MMOL/L
TROPONIN I SERPL DL<=0.01 NG/ML-MCNC: <0.006 NG/ML
WBC # BLD AUTO: 6.4 K/UL

## 2017-11-24 PROCEDURE — 94640 AIRWAY INHALATION TREATMENT: CPT

## 2017-11-24 PROCEDURE — 25000242 PHARM REV CODE 250 ALT 637 W/ HCPCS: Performed by: EMERGENCY MEDICINE

## 2017-11-24 PROCEDURE — 93010 ELECTROCARDIOGRAM REPORT: CPT | Mod: ,,, | Performed by: INTERNAL MEDICINE

## 2017-11-24 PROCEDURE — 84484 ASSAY OF TROPONIN QUANT: CPT

## 2017-11-24 PROCEDURE — 85025 COMPLETE CBC W/AUTO DIFF WBC: CPT

## 2017-11-24 PROCEDURE — 25000003 PHARM REV CODE 250: Performed by: EMERGENCY MEDICINE

## 2017-11-24 PROCEDURE — 93005 ELECTROCARDIOGRAM TRACING: CPT

## 2017-11-24 PROCEDURE — 83880 ASSAY OF NATRIURETIC PEPTIDE: CPT

## 2017-11-24 PROCEDURE — 80053 COMPREHEN METABOLIC PANEL: CPT

## 2017-11-24 PROCEDURE — 99284 EMERGENCY DEPT VISIT MOD MDM: CPT | Mod: 25

## 2017-11-24 PROCEDURE — 94761 N-INVAS EAR/PLS OXIMETRY MLT: CPT

## 2017-11-24 RX ORDER — IPRATROPIUM BROMIDE AND ALBUTEROL SULFATE 2.5; .5 MG/3ML; MG/3ML
3 SOLUTION RESPIRATORY (INHALATION)
Status: COMPLETED | OUTPATIENT
Start: 2017-11-24 | End: 2017-11-24

## 2017-11-24 RX ORDER — ASPIRIN 325 MG
325 TABLET ORAL
Status: COMPLETED | OUTPATIENT
Start: 2017-11-24 | End: 2017-11-24

## 2017-11-24 RX ADMIN — IPRATROPIUM BROMIDE AND ALBUTEROL SULFATE 3 ML: .5; 3 SOLUTION RESPIRATORY (INHALATION) at 11:11

## 2017-11-24 RX ADMIN — ASPIRIN 325 MG ORAL TABLET 325 MG: 325 PILL ORAL at 10:11

## 2017-11-25 VITALS
BODY MASS INDEX: 35.55 KG/M2 | WEIGHT: 240 LBS | OXYGEN SATURATION: 93 % | SYSTOLIC BLOOD PRESSURE: 134 MMHG | HEIGHT: 69 IN | HEART RATE: 70 BPM | RESPIRATION RATE: 18 BRPM | TEMPERATURE: 98 F | DIASTOLIC BLOOD PRESSURE: 76 MMHG

## 2017-11-25 LAB — TROPONIN I SERPL DL<=0.01 NG/ML-MCNC: <0.006 NG/ML

## 2017-11-25 PROCEDURE — 84484 ASSAY OF TROPONIN QUANT: CPT

## 2017-11-25 NOTE — ED NOTES
Received report from TAM Parra. Pt is resting in stretcher, in no acute distress, will continue to monitor.

## 2017-11-25 NOTE — ED NOTES
Pt rounding complete. Pt reporting chest pain. Pt denies any needs at the moment.  Pt does not appear to be in acute distress. Respirations are even and unlabored. VSS. Bed is locked and in lowest position with side rails up x2. Call light is within reach. Will continue to monitor.

## 2017-11-25 NOTE — ED NOTES
.   Pt updated on POC. Pt verbalized understanding. Pt reporting relief from SOB and chest tightness. Pt does not appear to be in acute distress. Respirations are even and unlabored. Bed is locked  And in lowest position with side rails up x2. Call light is within reach. Fiance' is within reach. Will continue to monitor,.

## 2017-11-25 NOTE — ED PROVIDER NOTES
Encounter Date: 11/24/2017    SCRIBE #1 NOTE: I, Brie Mariano, am scribing for, and in the presence of, Dr. Jose.       History     Chief Complaint   Patient presents with    Leg Swelling     bilateral leg swelling with hand and finger swelling progressing all day today, chest pain with headache     Time seen by provider: 9:55 PM    This is a 52 y.o. male who presents with complaint of bilateral lower extremity swelling that has persisted since last night.  The patient also endorses bilateral leg pain, bilateral hand swelling, SOB and left-sided CP, which has persisted since this morning.  He mentions no fever, chills, cough, He reports no identifying, alleviating, or exacerbating factors, including consumption of salty foods.  He claims that he has had no prior episodes of this symptomology.  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  He has surgical history of hernia repair and colonoscopy.          The history is provided by the patient and medical records.     Review of patient's allergies indicates:   Allergen Reactions    Other omega-3s Hives     Pt reports allergic to either an anti-inflammatory or antibiotic. PT unsure of name     Past Medical History:   Diagnosis Date    Asthma     Hypertension      Past Surgical History:   Procedure Laterality Date    BACK SURGERY      COLONOSCOPY N/A 2/23/2017    Procedure: COLONOSCOPY;  Surgeon: Wil Dimas MD;  Location: [facility] ([address] [place] [address]);  Service: Endoscopy;  Laterality: N/A;    decompression neck  2005    HERNIA REPAIR  2008    NASAL SEPTUM SURGERY  2005     Family History   Problem Relation Age of Onset    Heart disease Father     Heart attack Father      Social History   Substance Use Topics    Smoking status: Former Smoker    Smokeless tobacco: Never Used    Alcohol use No     Review of Systems   Constitutional: Negative for chills and fever.   HENT: Negative for congestion and facial  swelling.    Respiratory: Positive for shortness of breath. Negative for chest tightness.    Cardiovascular: Positive for chest pain and leg swelling.   Gastrointestinal: Negative for abdominal pain, nausea and vomiting.   Endocrine: Negative for polyuria.   Genitourinary: Negative for dysuria.   Musculoskeletal: Negative for myalgias.        Positive for bilateral hand swelling.  Positive for bilateral leg pain.   Skin: Negative for rash.   Neurological: Negative for headaches.       Physical Exam     Initial Vitals [11/24/17 2102]   BP Pulse Resp Temp SpO2   (!) 176/105 65 20 97.6 °F (36.4 °C) 100 %      MAP       128.67         Physical Exam    Nursing note and vitals reviewed.  Constitutional: He appears well-developed and well-nourished. He is not diaphoretic. No distress.   HENT:   Head: Normocephalic and atraumatic.   Left Ear: External ear normal.   Eyes: EOM are normal. Right eye exhibits no discharge. Left eye exhibits no discharge.   Neck: Normal range of motion.   Cardiovascular: Normal rate, regular rhythm and normal heart sounds. Exam reveals no gallop and no friction rub.    No murmur heard.  Pulses:       Dorsalis pedis pulses are 2+ on the right side, and 2+ on the left side.   2+ distal radial pulses bilaterally.    Pulmonary/Chest: Breath sounds normal. No respiratory distress. He has no wheezes. He has no rhonchi. He has no rales.   Clear to ascultation bilaterally.   Abdominal: Soft. There is no tenderness. There is no rebound and no guarding.   Abdomen soft and non-tender.    Musculoskeletal: Normal range of motion. He exhibits edema. He exhibits no tenderness.   Bilateral non-pitting lower extremity edema from the ankles to knees.  No tenderness.     Neurological: He is alert and oriented to person, place, and time.   Skin: Skin is warm and dry. No rash and no abscess noted. No erythema. No pallor.   No erythema or warmth to the bilateral lower extremities.    Psychiatric: He has a normal mood  and affect. His behavior is normal. Judgment and thought content normal.         ED Course   Procedures  Labs Reviewed   CBC W/ AUTO DIFFERENTIAL - Abnormal; Notable for the following:        Result Value    RBC 4.58 (*)     All other components within normal limits   COMPREHENSIVE METABOLIC PANEL - Abnormal; Notable for the following:     CO2 31 (*)     Anion Gap 7 (*)     All other components within normal limits   TROPONIN I   B-TYPE NATRIURETIC PEPTIDE   TROPONIN I      Imaging Results          X-Ray Chest PA And Lateral (Final result)  Result time 11/24/17 23:00:22    Final result by Danny Flood MD (11/24/17 23:00:22)                 Impression:      Mild bibasilar atelectasis, otherwise no acute cardiopulmonary process identified.      Electronically signed by: DANNY FLOOD MD  Date:     11/24/17  Time:    23:00              Narrative:    Chest PA and lateral.  Comparison: 7/26/17.    Cardiac silhouette is mildly enlarged but stable in size.  Mediastinal structures are midline.  Lungs are symmetrically expanded.  There is mild bibasilar atelectasis.  Otherwise no evidence of confluent focal consolidative process, pneumothorax, or significant effusion.  Bones appear intact.  No free air visualized beneath the diaphragm.  Small metallic density again noted within the left lateral chest wall.                              EKG Readings: (Independently Interpreted)   Initial Reading: No STEMI.   Normal sinus rhythm at 64 beats per minute. No concerning ST segment changes. Normal intervals.         X-Rays:   Independently Interpreted Readings:   Chest X-Ray: X-Ray Chest PA And Lateral: No effusions or opacities. No obvious infiltrate. No bony abnormalities. No acute process.       Medical Decision Making:   History:   Old Medical Records: I decided to obtain old medical records.  Independently Interpreted Test(s):   I have ordered and independently interpreted X-rays - see prior notes.  I have ordered and  independently interpreted EKG Reading(s) - see prior notes  Clinical Tests:   Lab Tests: Ordered and Reviewed  Radiological Study: Ordered and Reviewed  Medical Tests: Ordered and Reviewed  ED Management:  Well-appearing patient with no cardiac or pulmonary history presents complaining of lower extremity edema, as well as chest pain and new shortness of breath.  He does have bilateral lower extremity edema, it is not pitting, and is not severe.  His heart lungs are clear.  His oxygen is normal.  His x-ray reveals no acute findings.  His blood work including BNP and troponins are both negative.  EKG is nonischemic.  Unlikely PE given the bilateral edema.  Certainly could relate to the recent Thanksgiving holiday with increased sodium intake.  Encouraged to elevate his legs, see if compression stockings help.  Return here if worse.  Otherwise follow-up with primary care.    I did have an extensive talk regarding signs to return for and need for follow up. Patient expressed understanding and will monitor symptoms closely and follow-up as needed.    JOSE Jose M.D.  11/25/2017  4:42 AM              Scribe Attestation:   Scribe #1: I performed the above scribed service and the documentation accurately describes the services I performed. I attest to the accuracy of the note.    Attending Attestation:           Physician Attestation for Scribe:  Physician Attestation Statement for Scribe #1: I, Dr. Jose, reviewed documentation, as scribed by Brie Quintana in my presence, and it is both accurate and complete.                 ED Course      Clinical Impression:     1. Bilateral lower extremity edema    2. Chest pain                                 Jake Jose MD  11/25/17 6429

## 2017-12-21 ENCOUNTER — OFFICE VISIT (OUTPATIENT)
Dept: INTERNAL MEDICINE | Facility: CLINIC | Age: 52
End: 2017-12-21
Attending: INTERNAL MEDICINE
Payer: MEDICARE

## 2017-12-21 VITALS
SYSTOLIC BLOOD PRESSURE: 120 MMHG | HEART RATE: 74 BPM | WEIGHT: 248.44 LBS | BODY MASS INDEX: 36.8 KG/M2 | DIASTOLIC BLOOD PRESSURE: 90 MMHG | HEIGHT: 69 IN

## 2017-12-21 DIAGNOSIS — I10 BENIGN ESSENTIAL HYPERTENSION: ICD-10-CM

## 2017-12-21 DIAGNOSIS — K13.70 ORAL LESION: ICD-10-CM

## 2017-12-21 DIAGNOSIS — Z13.220 NEED FOR LIPID SCREENING: ICD-10-CM

## 2017-12-21 DIAGNOSIS — Z11.59 NEED FOR HEPATITIS C SCREENING TEST: Primary | ICD-10-CM

## 2017-12-21 DIAGNOSIS — E78.5 DYSLIPIDEMIA: Chronic | ICD-10-CM

## 2017-12-21 DIAGNOSIS — J45.30 MILD PERSISTENT ASTHMA WITHOUT COMPLICATION: ICD-10-CM

## 2017-12-21 DIAGNOSIS — E66.01 SEVERE OBESITY (BMI 35.0-39.9) WITH COMORBIDITY: ICD-10-CM

## 2017-12-21 DIAGNOSIS — Z91.89 AT RISK FOR SIDE EFFECT OF MEDICATION: ICD-10-CM

## 2017-12-21 PROCEDURE — 99214 OFFICE O/P EST MOD 30 MIN: CPT | Mod: S$GLB,,, | Performed by: INTERNAL MEDICINE

## 2017-12-21 PROCEDURE — 99999 PR PBB SHADOW E&M-EST. PATIENT-LVL IV: CPT | Mod: PBBFAC,,, | Performed by: INTERNAL MEDICINE

## 2017-12-21 PROCEDURE — 99499 UNLISTED E&M SERVICE: CPT | Mod: S$GLB,,, | Performed by: INTERNAL MEDICINE

## 2017-12-21 RX ORDER — HYDROXYZINE HYDROCHLORIDE 25 MG/1
25 TABLET, FILM COATED ORAL 3 TIMES DAILY PRN
Qty: 90 TABLET | Refills: 0 | Status: SHIPPED | OUTPATIENT
Start: 2017-12-21 | End: 2018-01-19 | Stop reason: SDUPTHER

## 2017-12-21 RX ORDER — BUDESONIDE AND FORMOTEROL FUMARATE DIHYDRATE 160; 4.5 UG/1; UG/1
2 AEROSOL RESPIRATORY (INHALATION) EVERY 12 HOURS
Qty: 10.2 G | Refills: 5 | Status: SHIPPED | OUTPATIENT
Start: 2017-12-21 | End: 2018-10-18

## 2017-12-21 RX ORDER — HYDROCHLOROTHIAZIDE 12.5 MG/1
12.5 TABLET ORAL DAILY
Qty: 90 TABLET | Refills: 1 | Status: SHIPPED | OUTPATIENT
Start: 2017-12-21 | End: 2018-06-14 | Stop reason: SDUPTHER

## 2017-12-21 NOTE — PATIENT INSTRUCTIONS

## 2017-12-28 PROBLEM — K13.70 ORAL LESION: Status: ACTIVE | Noted: 2017-12-28

## 2017-12-28 PROBLEM — I10 BENIGN ESSENTIAL HYPERTENSION: Status: ACTIVE | Noted: 2017-12-28

## 2017-12-28 NOTE — PROGRESS NOTES
"Subjective:       Patient ID: Stevenson Robison Jr. is a 52 y.o. male.    Chief Complaint: Annual Exam (Hospital F/U) and Back Pain (Pressure/Pain)    Here for annual exam    Seen in ED 1 month prior for swelling of legs and fingers. Non adherent to low salt diet months leading to Thanksgiving holidays which involved a large increase in confections. He denies PND, orthopnea. Symptoms have not returned. He reports focal irritation in back of throat for past 1-2 weeks. Suspects due to eating potato chips possibly cutting roof of mouth.    Stress levels are up especially surrounding medical concerns. Requesting medication assistance at this time.           Review of Systems    Objective:      Vitals:    12/21/17 1005   BP: (!) 120/90   Pulse: 74   Weight: 112.7 kg (248 lb 7.3 oz)   Height: 5' 9" (1.753 m)      Physical Exam   Constitutional: He is oriented to person, place, and time. He appears well-developed and well-nourished. No distress.   HENT:   Head: Normocephalic and atraumatic.   Mouth/Throat: Oropharynx is clear and moist. No oropharyngeal exudate.       Eyes: Conjunctivae and EOM are normal. Pupils are equal, round, and reactive to light. No scleral icterus.   Neck: No thyromegaly present.   Cardiovascular: Normal rate, regular rhythm and normal heart sounds.    No murmur heard.  Pulmonary/Chest: Effort normal and breath sounds normal. He has no wheezes. He has no rales.   Abdominal: Soft. He exhibits no distension. There is no tenderness.   Musculoskeletal: He exhibits no edema or tenderness.   Lymphadenopathy:     He has no cervical adenopathy.   Neurological: He is alert and oriented to person, place, and time.   Skin: Skin is warm and dry.   Psychiatric: He has a normal mood and affect. His behavior is normal.       Assessment:       1. Need for hepatitis C screening test    2. At risk for side effect of medication    3. Oral lesion    4. Need for lipid screening    5. Dyslipidemia    6. Mild persistent " asthma without complication    7. Benign essential hypertension    8. Severe obesity (BMI 35.0-39.9) with comorbidity        Plan:       Stevenson was seen today for annual exam and back pain.    Diagnoses and all orders for this visit:    Need for hepatitis C screening test  -     Hepatitis C antibody; Future    At risk for side effect of medication  -     Basic metabolic panel; Future    Oral lesion  -     Ambulatory Referral to ENT    Need for lipid screening    Dyslipidemia    Mild persistent asthma without complication  -     budesonide-formoterol 160-4.5 mcg (SYMBICORT) 160-4.5 mcg/actuation HFAA; Inhale 2 puffs into the lungs every 12 (twelve) hours. Controller    Benign essential hypertension  -    START  hydroCHLOROthiazide (HYDRODIURIL) 12.5 MG Tab; Take 1 tablet (12.5 mg total) by mouth once daily.\  Severe obesity (BMI 35.0-39.9) with comorbidity  Diet and exercise stressed to patient and need for weight loss.  Discussed intake as well as at minimum walking at moderate pace for 30-45 minutes/day 4 times per week.    Other orders  -     hydrOXYzine HCl (ATARAX) 25 MG tablet; Take 1 tablet (25 mg total) by mouth 3 (three) times daily as needed for Anxiety.               Side effects of medication(s) were discussed in detail and patient voiced understanding.  Patient will call back for any issues or complications.

## 2018-01-19 RX ORDER — HYDROXYZINE HYDROCHLORIDE 25 MG/1
25 TABLET, FILM COATED ORAL 3 TIMES DAILY PRN
Qty: 90 TABLET | Refills: 0 | Status: SHIPPED | OUTPATIENT
Start: 2018-01-19 | End: 2018-02-28 | Stop reason: SDUPTHER

## 2018-02-28 RX ORDER — HYDROXYZINE HYDROCHLORIDE 25 MG/1
25 TABLET, FILM COATED ORAL 3 TIMES DAILY PRN
Qty: 90 TABLET | Refills: 0 | Status: SHIPPED | OUTPATIENT
Start: 2018-02-28 | End: 2019-06-10

## 2018-05-03 ENCOUNTER — PES CALL (OUTPATIENT)
Dept: ADMINISTRATIVE | Facility: CLINIC | Age: 53
End: 2018-05-03

## 2018-06-14 RX ORDER — HYDROCHLOROTHIAZIDE 12.5 MG/1
12.5 TABLET ORAL DAILY
Qty: 90 TABLET | Refills: 1 | Status: SHIPPED | OUTPATIENT
Start: 2018-06-14 | End: 2018-11-30

## 2018-09-18 ENCOUNTER — PES CALL (OUTPATIENT)
Dept: ADMINISTRATIVE | Facility: CLINIC | Age: 53
End: 2018-09-18

## 2018-09-24 ENCOUNTER — PATIENT OUTREACH (OUTPATIENT)
Dept: ADMINISTRATIVE | Facility: HOSPITAL | Age: 53
End: 2018-09-24

## 2018-09-24 NOTE — PROGRESS NOTES
Patient was contacted to scheduled visit with Ty Rodríguez MD. Attempt unsuccessful,will follow up with patient at a later time.     Left message for patient to contact office to schedule annual visit.

## 2018-10-18 ENCOUNTER — OFFICE VISIT (OUTPATIENT)
Dept: INTERNAL MEDICINE | Facility: CLINIC | Age: 53
End: 2018-10-18
Attending: INTERNAL MEDICINE
Payer: MEDICARE

## 2018-10-18 ENCOUNTER — LAB VISIT (OUTPATIENT)
Dept: LAB | Facility: OTHER | Age: 53
End: 2018-10-18
Attending: INTERNAL MEDICINE
Payer: MEDICARE

## 2018-10-18 VITALS
HEIGHT: 69 IN | DIASTOLIC BLOOD PRESSURE: 100 MMHG | WEIGHT: 246.69 LBS | SYSTOLIC BLOOD PRESSURE: 156 MMHG | BODY MASS INDEX: 36.54 KG/M2 | OXYGEN SATURATION: 97 % | HEART RATE: 65 BPM

## 2018-10-18 DIAGNOSIS — R79.9 ABNORMAL FINDING OF BLOOD CHEMISTRY: ICD-10-CM

## 2018-10-18 DIAGNOSIS — H57.12 EYE PAIN, LEFT: ICD-10-CM

## 2018-10-18 DIAGNOSIS — I10 BENIGN ESSENTIAL HYPERTENSION: ICD-10-CM

## 2018-10-18 DIAGNOSIS — R79.9 ABNORMAL BLOOD CHEMISTRY: ICD-10-CM

## 2018-10-18 DIAGNOSIS — H53.2 DIPLOPIA: ICD-10-CM

## 2018-10-18 DIAGNOSIS — Z11.59 NEED FOR HEPATITIS C SCREENING TEST: ICD-10-CM

## 2018-10-18 DIAGNOSIS — Z00.00 ANNUAL PHYSICAL EXAM: ICD-10-CM

## 2018-10-18 DIAGNOSIS — Z00.00 ANNUAL PHYSICAL EXAM: Primary | ICD-10-CM

## 2018-10-18 DIAGNOSIS — H53.142 PHOTOPHOBIA, LEFT EYE: ICD-10-CM

## 2018-10-18 DIAGNOSIS — J45.30 MILD PERSISTENT ASTHMA WITHOUT COMPLICATION: ICD-10-CM

## 2018-10-18 LAB
ALBUMIN SERPL BCP-MCNC: 4.1 G/DL
ALP SERPL-CCNC: 64 U/L
ALT SERPL W/O P-5'-P-CCNC: 19 U/L
ANION GAP SERPL CALC-SCNC: 7 MMOL/L
AST SERPL-CCNC: 21 U/L
BASOPHILS # BLD AUTO: 0.02 K/UL
BASOPHILS NFR BLD: 0.3 %
BILIRUB SERPL-MCNC: 0.6 MG/DL
BUN SERPL-MCNC: 14 MG/DL
CALCIUM SERPL-MCNC: 9.6 MG/DL
CHLORIDE SERPL-SCNC: 103 MMOL/L
CHOLEST SERPL-MCNC: 233 MG/DL
CHOLEST/HDLC SERPL: 4.9 {RATIO}
CO2 SERPL-SCNC: 29 MMOL/L
CREAT SERPL-MCNC: 0.9 MG/DL
DIFFERENTIAL METHOD: NORMAL
EOSINOPHIL # BLD AUTO: 0.1 K/UL
EOSINOPHIL NFR BLD: 1.3 %
ERYTHROCYTE [DISTWIDTH] IN BLOOD BY AUTOMATED COUNT: 12.5 %
EST. GFR  (AFRICAN AMERICAN): >60 ML/MIN/1.73 M^2
EST. GFR  (NON AFRICAN AMERICAN): >60 ML/MIN/1.73 M^2
ESTIMATED AVG GLUCOSE: 108 MG/DL
GLUCOSE SERPL-MCNC: 87 MG/DL
HBA1C MFR BLD HPLC: 5.4 %
HCT VFR BLD AUTO: 45.3 %
HDLC SERPL-MCNC: 48 MG/DL
HDLC SERPL: 20.6 %
HGB BLD-MCNC: 15 G/DL
LDLC SERPL CALC-MCNC: 150.4 MG/DL
LYMPHOCYTES # BLD AUTO: 2.3 K/UL
LYMPHOCYTES NFR BLD: 37 %
MCH RBC QN AUTO: 30.7 PG
MCHC RBC AUTO-ENTMCNC: 33.1 G/DL
MCV RBC AUTO: 93 FL
MONOCYTES # BLD AUTO: 0.6 K/UL
MONOCYTES NFR BLD: 9.1 %
NEUTROPHILS # BLD AUTO: 3.3 K/UL
NEUTROPHILS NFR BLD: 52.1 %
NONHDLC SERPL-MCNC: 185 MG/DL
PLATELET # BLD AUTO: 236 K/UL
PMV BLD AUTO: 10.1 FL
POTASSIUM SERPL-SCNC: 4.1 MMOL/L
PROT SERPL-MCNC: 7.3 G/DL
RBC # BLD AUTO: 4.88 M/UL
SODIUM SERPL-SCNC: 139 MMOL/L
TRIGL SERPL-MCNC: 173 MG/DL
TSH SERPL DL<=0.005 MIU/L-ACNC: 0.61 UIU/ML
WBC # BLD AUTO: 6.25 K/UL

## 2018-10-18 PROCEDURE — 84443 ASSAY THYROID STIM HORMONE: CPT

## 2018-10-18 PROCEDURE — 3077F SYST BP >= 140 MM HG: CPT | Mod: CPTII,,, | Performed by: INTERNAL MEDICINE

## 2018-10-18 PROCEDURE — 85025 COMPLETE CBC W/AUTO DIFF WBC: CPT

## 2018-10-18 PROCEDURE — 80061 LIPID PANEL: CPT

## 2018-10-18 PROCEDURE — 86803 HEPATITIS C AB TEST: CPT

## 2018-10-18 PROCEDURE — 99999 PR PBB SHADOW E&M-EST. PATIENT-LVL IV: CPT | Mod: PBBFAC,,, | Performed by: INTERNAL MEDICINE

## 2018-10-18 PROCEDURE — 80053 COMPREHEN METABOLIC PANEL: CPT

## 2018-10-18 PROCEDURE — 3080F DIAST BP >= 90 MM HG: CPT | Mod: CPTII,,, | Performed by: INTERNAL MEDICINE

## 2018-10-18 PROCEDURE — 99396 PREV VISIT EST AGE 40-64: CPT | Mod: S$PBB,,, | Performed by: INTERNAL MEDICINE

## 2018-10-18 PROCEDURE — 99214 OFFICE O/P EST MOD 30 MIN: CPT | Mod: PBBFAC | Performed by: INTERNAL MEDICINE

## 2018-10-18 PROCEDURE — 83036 HEMOGLOBIN GLYCOSYLATED A1C: CPT

## 2018-10-18 PROCEDURE — 36415 COLL VENOUS BLD VENIPUNCTURE: CPT

## 2018-10-18 NOTE — PROGRESS NOTES
"Subjective:       Patient ID: Stevenson Robison Jr. is a 53 y.o. male.    Chief Complaint: Annual Exam and Eye Pain (and black dot in L eye )    Here for annual exam    He reports a 2 week Hx of left eye pain and sensitivity to light. Around the same time he has noticed floaters. He states when he looks down and left and then looks up he seems to be able to drag the floaters to top of his vision then they "fall down". Described as black dot. He denies discharge from eye, eye redness, F/C, recent trauma.     He is not taking advair and he has not required his albuterol in almost a year. He denies ROBERTS, SOB, chest tightness, wheezing, CP.          Review of Systems    Objective:      Vitals:    10/18/18 1046   BP: (!) 156/100   Pulse: 65   SpO2: 97%   Weight: 111.9 kg (246 lb 11.1 oz)   Height: 5' 9" (1.753 m)      Physical Exam   Constitutional: He is oriented to person, place, and time. He appears well-developed and well-nourished. No distress.   HENT:   Head: Normocephalic and atraumatic.   Mouth/Throat: Oropharynx is clear and moist. No oropharyngeal exudate.   Eyes: Conjunctivae and EOM are normal. Pupils are equal, round, and reactive to light. Left eye exhibits no chemosis, no discharge, no exudate and no hordeolum. Right conjunctiva is not injected. Right conjunctiva has no hemorrhage. Left conjunctiva is not injected. Left conjunctiva has no hemorrhage. No scleral icterus. Right eye exhibits normal extraocular motion and no nystagmus. Left eye exhibits normal extraocular motion and no nystagmus.   Neck: No thyromegaly present.   Cardiovascular: Normal rate, regular rhythm and normal heart sounds.   No murmur heard.  Pulmonary/Chest: Effort normal and breath sounds normal. He has no wheezes. He has no rales.   Abdominal: Soft. He exhibits no distension. There is no tenderness.   Musculoskeletal: He exhibits no edema or tenderness.   Lymphadenopathy:     He has no cervical adenopathy.   Neurological: He is alert " and oriented to person, place, and time.   Skin: Skin is warm and dry.   Psychiatric: He has a normal mood and affect. His behavior is normal.       Assessment:       1. Annual physical exam    2. Eye pain, left    3. Photophobia, left eye    4. Need for hepatitis C screening test    5. Abnormal finding of blood chemistry     6. Abnormal blood chemistry    7. Diplopia     8. Mild persistent asthma without complication    9. Benign essential hypertension        Plan:       Stevenson was seen today for annual exam and eye pain.    Diagnoses and all orders for this visit:    Annual physical exam  -     Comprehensive metabolic panel; Future  -     Lipid panel; Future  -     TSH; Future  -     CBC auto differential; Future  -     Hemoglobin A1c; Future    Eye pain, left  -     Ambulatory referral to Optometry  -     Ambulatory referral to Ophthalmology    Photophobia, left eye  -     Ambulatory referral to Optometry  -     Ambulatory referral to Ophthalmology    Need for hepatitis C screening test  -     Hepatitis C antibody; Future    Reactive airway  Well controlled continue prn     HTN  controlled. Continue current regimen    RTC in 6 months or sooner prn         Side effects of medication(s) were discussed in detail and patient voiced understanding.  Patient will call back for any issues or complications.

## 2018-10-19 ENCOUNTER — IMMUNIZATION (OUTPATIENT)
Dept: PHARMACY | Facility: CLINIC | Age: 53
End: 2018-10-19
Payer: MEDICARE

## 2018-10-19 LAB
HCV AB SERPL QL IA: NEGATIVE
HCV AB SERPL QL IA: NEGATIVE

## 2018-10-29 ENCOUNTER — TELEPHONE (OUTPATIENT)
Dept: INTERNAL MEDICINE | Facility: CLINIC | Age: 53
End: 2018-10-29

## 2018-10-29 NOTE — TELEPHONE ENCOUNTER
----- Message from Cari Krishnamurthy sent at 10/26/2018  3:13 PM CDT -----  Contact: Libia/ [phone]            Name of Who is Calling: Spouse       What is the request in detail: Patient's wife called she stated that her  didn't start the medication when he was supposed to, she need to speak with the nurse regarding this matter. Please call her today per her request.     Can the clinic reply by [facility]: no    [facility]: [phone]

## 2018-10-29 NOTE — TELEPHONE ENCOUNTER
reilly for pts. Wife Libia to give me a call regarding any questions she may have about the pt.s medication.  Lex

## 2018-11-20 ENCOUNTER — TELEPHONE (OUTPATIENT)
Dept: INTERNAL MEDICINE | Facility: CLINIC | Age: 53
End: 2018-11-20

## 2018-11-20 NOTE — TELEPHONE ENCOUNTER
Pt called stating his blood pressure has been high the last couple of days. Pt stated he checked his blood pressure today it was 179/96. His only symptom is headache at this time, denies dizziness, chest pains, sob, blurred vision, numbness or tingling to extremities.   Appointment was offered to pt for tomorrow which pt has declined, stating he is going out of town early morning. Pt was advised that he should continue to monitor b/p and if blood pressure continue to elevate he should go to Urgent care / ED . Please advise further.  Lov: 10/18/18  Jasmin Piedra LPN

## 2018-11-21 NOTE — TELEPHONE ENCOUNTER
Attempted to call Mr. Robison to inform him that Dr. Mcmanus needed him to go to urgent care for further evaluation.  No answer.  Left voice message to call the office.

## 2018-11-30 ENCOUNTER — OFFICE VISIT (OUTPATIENT)
Dept: INTERNAL MEDICINE | Facility: CLINIC | Age: 53
End: 2018-11-30
Attending: INTERNAL MEDICINE
Payer: MEDICARE

## 2018-11-30 VITALS
OXYGEN SATURATION: 98 % | HEART RATE: 82 BPM | WEIGHT: 243.19 LBS | BODY MASS INDEX: 36.02 KG/M2 | DIASTOLIC BLOOD PRESSURE: 82 MMHG | SYSTOLIC BLOOD PRESSURE: 140 MMHG | HEIGHT: 69 IN

## 2018-11-30 DIAGNOSIS — I10 BENIGN ESSENTIAL HYPERTENSION: Primary | ICD-10-CM

## 2018-11-30 DIAGNOSIS — Z01.818 PREOP EXAMINATION: ICD-10-CM

## 2018-11-30 DIAGNOSIS — Z91.89 AT RISK FOR CORONARY ARTERY DISEASE: ICD-10-CM

## 2018-11-30 PROCEDURE — 3079F DIAST BP 80-89 MM HG: CPT | Mod: CPTII,HCNC,S$GLB, | Performed by: INTERNAL MEDICINE

## 2018-11-30 PROCEDURE — 99999 PR PBB SHADOW E&M-EST. PATIENT-LVL III: CPT | Mod: PBBFAC,HCNC,, | Performed by: INTERNAL MEDICINE

## 2018-11-30 PROCEDURE — 3077F SYST BP >= 140 MM HG: CPT | Mod: CPTII,HCNC,S$GLB, | Performed by: INTERNAL MEDICINE

## 2018-11-30 PROCEDURE — 99214 OFFICE O/P EST MOD 30 MIN: CPT | Mod: HCNC,S$GLB,, | Performed by: INTERNAL MEDICINE

## 2018-11-30 PROCEDURE — 3008F BODY MASS INDEX DOCD: CPT | Mod: CPTII,HCNC,S$GLB, | Performed by: INTERNAL MEDICINE

## 2018-11-30 RX ORDER — VALSARTAN 160 MG/1
160 TABLET ORAL DAILY
Qty: 90 TABLET | Refills: 3 | Status: SHIPPED | OUTPATIENT
Start: 2018-11-30 | End: 2019-06-10 | Stop reason: SDUPTHER

## 2018-11-30 NOTE — PROGRESS NOTES
"Subjective:       Patient ID: Stevenson Robison Jr. is a 53 y.o. male.    Chief Complaint: Headache and Hypertension    Here for urgent visit    Has had a few HA recently. No migrainous features. No neurological symptoms of weakness, numbness/tingling, dizziness, F/C. + chronic neck pain. Reports BP is labile at office visits. He is historically anxious about his health. He is aware of this.   Today we discussed his ASCVD and need for statin. Pt denies CP at rest or with exertion, SOB, episodic chest tightness and wheezing, chronic cough, palpitations, dizziness, orthopnea, PND, LE edema.       The 10-year ASCVD risk score (Fransisco GAYTAN Jr., et al., 2013) is: 8%    Values used to calculate the score:      Age: 53 years      Sex: Male      Is Non- : No      Diabetic: No      Tobacco smoker: No      Systolic Blood Pressure: 140 mmHg      Is BP treated: Yes      HDL Cholesterol: 48 mg/dL      Total Cholesterol: 233 mg/dL    Review of Systems   Constitutional: Negative for appetite change, chills, fever and unexpected weight change.   HENT: Negative for hearing loss, sore throat and trouble swallowing.    Eyes: Negative for visual disturbance.   Respiratory: Negative for cough, chest tightness and shortness of breath.    Cardiovascular: Negative for chest pain and leg swelling.   Gastrointestinal: Negative for abdominal pain, blood in stool, constipation, diarrhea, nausea and vomiting.   Endocrine: Negative for polydipsia and polyuria.   Genitourinary: Negative for decreased urine volume, difficulty urinating, dysuria, frequency and urgency.   Musculoskeletal: Negative for gait problem.   Skin: Negative for rash.   Neurological: Negative for dizziness and numbness.   Psychiatric/Behavioral: The patient is not nervous/anxious.        Objective:      Vitals:    11/30/18 1507   BP: (!) 140/82   Pulse: 82   SpO2: 98%   Weight: 110.3 kg (243 lb 2.7 oz)   Height: 5' 9" (1.753 m)      Physical Exam "   Constitutional: He is oriented to person, place, and time. He appears well-developed and well-nourished. No distress.   HENT:   Head: Normocephalic and atraumatic.   Mouth/Throat: Oropharynx is clear and moist. No oropharyngeal exudate.   Eyes: Conjunctivae and EOM are normal. Pupils are equal, round, and reactive to light. No scleral icterus.   Neck: No thyromegaly present.   Cardiovascular: Normal rate, regular rhythm and normal heart sounds.   No murmur heard.  Pulmonary/Chest: Effort normal and breath sounds normal. He has no wheezes. He has no rales.   Abdominal: Soft. He exhibits no distension. There is no tenderness.   Musculoskeletal: He exhibits no edema or tenderness.   Lymphadenopathy:     He has no cervical adenopathy.   Neurological: He is alert and oriented to person, place, and time.   Skin: Skin is warm and dry.   Psychiatric: He has a normal mood and affect. His behavior is normal.       Assessment:       1. Benign essential hypertension    2. At risk for coronary artery disease    3. Preop examination        Plan:       Stevenson was seen today for headache and hypertension.    Diagnoses and all orders for this visit:    Benign essential hypertension  -     Hypertension Digital Medicine (HDMP) Enrollment Order  -     valsartan (DIOVAN) 160 MG tablet; Take 1 tablet (160 mg total) by mouth once daily.    At risk for coronary artery disease  -     CT Cardiac Scoring; Future    Preop examination  -No signs or symptoms of underlying or uncontrolled cardiac or pulmonary pathology  -EKG done 11/24/17without any acute or remote signs of ischemia or underlying arrhythmia   -Patient has an estimated risk of perioperative myocardial infarction or cardiac arrest of approx 0.1 % using [facility] NSQIP database risk model. Capable of >4 METs.  -No additional testing or change in management is required prior to elective procedure.                    Side effects of medication(s) were discussed in detail and  patient voiced understanding.  Patient will call back for any issues or complications.

## 2018-12-14 ENCOUNTER — TELEPHONE (OUTPATIENT)
Dept: INTERNAL MEDICINE | Facility: CLINIC | Age: 53
End: 2018-12-14

## 2018-12-14 NOTE — TELEPHONE ENCOUNTER
Pt came into clinic to day for BP check and the  have left for the day and no one was able to check him in. I informed pt of this and he stated that his wife informed him that as long as he come before the office close I informed him that the nurse schedule stop at 4:30 it was 4:42 when the pt showed up. Pt also stated that he is having surgery and that he is unable to get the die in his heart before to check his heart because his insurance in unable to cover it and the cost is over 200 dollars. Pt stated that he would like for your to clear him for his back surgery for 12/18/18. I informed him that I will send all the information he gave me to Dr. Rodríguez so he can be informed

## 2019-01-28 ENCOUNTER — PATIENT OUTREACH (OUTPATIENT)
Dept: ADMINISTRATIVE | Facility: HOSPITAL | Age: 54
End: 2019-01-28

## 2019-01-28 NOTE — PROGRESS NOTES
[facility] is committed to your overall health and would to ensure that you are up to date on your recommended health testing. Ty Rodríguez MD has found that you maybe due for the following:    Health Maintenance Due   Topic Date Due    Influenza Vaccine  08/01/2018           Also, Your last recorded [facility] blood pressure reading was elevated. Please schedule a nurse visit or doctor visit to have your blood pressure retaken. Please take any prescribed medication as directed the day of the appointment. Please bring in a home blood pressure machine to compare if you have one.     Please schedule these appointments at your earliest convenience by calling [phone]* or going to [email or web address]

## 2019-03-19 ENCOUNTER — PATIENT OUTREACH (OUTPATIENT)
Dept: ADMINISTRATIVE | Facility: HOSPITAL | Age: 54
End: 2019-03-19

## 2019-06-10 ENCOUNTER — OFFICE VISIT (OUTPATIENT)
Dept: INTERNAL MEDICINE | Facility: CLINIC | Age: 54
End: 2019-06-10
Attending: INTERNAL MEDICINE
Payer: MEDICARE

## 2019-06-10 VITALS
BODY MASS INDEX: 36.34 KG/M2 | DIASTOLIC BLOOD PRESSURE: 94 MMHG | OXYGEN SATURATION: 96 % | SYSTOLIC BLOOD PRESSURE: 141 MMHG | HEIGHT: 69 IN | HEART RATE: 91 BPM | WEIGHT: 245.38 LBS

## 2019-06-10 DIAGNOSIS — E66.01 SEVERE OBESITY (BMI 35.0-39.9) WITH COMORBIDITY: ICD-10-CM

## 2019-06-10 DIAGNOSIS — R21 RASH AND NONSPECIFIC SKIN ERUPTION: Primary | ICD-10-CM

## 2019-06-10 DIAGNOSIS — M79.5 FOREIGN BODY (FB) IN SOFT TISSUE: ICD-10-CM

## 2019-06-10 DIAGNOSIS — I10 BENIGN ESSENTIAL HYPERTENSION: ICD-10-CM

## 2019-06-10 PROCEDURE — 3077F PR MOST RECENT SYSTOLIC BLOOD PRESSURE >= 140 MM HG: ICD-10-PCS | Mod: HCNC,CPTII,S$GLB, | Performed by: INTERNAL MEDICINE

## 2019-06-10 PROCEDURE — 3008F BODY MASS INDEX DOCD: CPT | Mod: HCNC,CPTII,S$GLB, | Performed by: INTERNAL MEDICINE

## 2019-06-10 PROCEDURE — 3077F SYST BP >= 140 MM HG: CPT | Mod: HCNC,CPTII,S$GLB, | Performed by: INTERNAL MEDICINE

## 2019-06-10 PROCEDURE — 99999 PR PBB SHADOW E&M-EST. PATIENT-LVL IV: CPT | Mod: PBBFAC,HCNC,, | Performed by: INTERNAL MEDICINE

## 2019-06-10 PROCEDURE — 99999 PR PBB SHADOW E&M-EST. PATIENT-LVL IV: ICD-10-PCS | Mod: PBBFAC,HCNC,, | Performed by: INTERNAL MEDICINE

## 2019-06-10 PROCEDURE — 99499 UNLISTED E&M SERVICE: CPT | Mod: HCNC,S$GLB,, | Performed by: INTERNAL MEDICINE

## 2019-06-10 PROCEDURE — 3080F PR MOST RECENT DIASTOLIC BLOOD PRESSURE >= 90 MM HG: ICD-10-PCS | Mod: HCNC,CPTII,S$GLB, | Performed by: INTERNAL MEDICINE

## 2019-06-10 PROCEDURE — 99499 RISK ADDL DX/OHS AUDIT: ICD-10-PCS | Mod: HCNC,S$GLB,, | Performed by: INTERNAL MEDICINE

## 2019-06-10 PROCEDURE — 99214 OFFICE O/P EST MOD 30 MIN: CPT | Mod: HCNC,S$GLB,, | Performed by: INTERNAL MEDICINE

## 2019-06-10 PROCEDURE — 3080F DIAST BP >= 90 MM HG: CPT | Mod: HCNC,CPTII,S$GLB, | Performed by: INTERNAL MEDICINE

## 2019-06-10 PROCEDURE — 99214 PR OFFICE/OUTPT VISIT, EST, LEVL IV, 30-39 MIN: ICD-10-PCS | Mod: HCNC,S$GLB,, | Performed by: INTERNAL MEDICINE

## 2019-06-10 PROCEDURE — 3008F PR BODY MASS INDEX (BMI) DOCUMENTED: ICD-10-PCS | Mod: HCNC,CPTII,S$GLB, | Performed by: INTERNAL MEDICINE

## 2019-06-10 RX ORDER — HYDROCHLOROTHIAZIDE 12.5 MG/1
12.5 TABLET ORAL DAILY
Qty: 90 TABLET | Refills: 2 | Status: SHIPPED | OUTPATIENT
Start: 2019-06-10 | End: 2019-11-14 | Stop reason: SDUPTHER

## 2019-06-10 RX ORDER — LEVOFLOXACIN 500 MG/1
500 TABLET, FILM COATED ORAL DAILY
Refills: 0 | COMMUNITY
Start: 2019-05-29 | End: 2020-01-23

## 2019-06-10 RX ORDER — VALSARTAN 160 MG/1
160 TABLET ORAL DAILY
Qty: 90 TABLET | Refills: 3 | Status: SHIPPED | OUTPATIENT
Start: 2019-06-10 | End: 2019-11-14

## 2019-06-10 RX ORDER — PROMETHAZINE HYDROCHLORIDE AND DEXTROMETHORPHAN HYDROBROMIDE 6.25; 15 MG/5ML; MG/5ML
SYRUP ORAL
Refills: 0 | COMMUNITY
Start: 2019-05-29 | End: 2022-06-15

## 2019-06-10 NOTE — PROGRESS NOTES
"Subjective:       Patient ID: Stevenson Robison Jr. is a 54 y.o. male.    Chief Complaint: Diarrhea and Dizziness    Here for urgent visit    Started on levofloxacin by ENT for recent URI and sinus symptoms. These are improving. He was having some loose stools with this but this is also improving.     He was having a recent MRI and the skin of his left chest began to become painful and his skin began to rise. The MRI was emergently stopped. He recalls an episodes decades prior when he and a friend were hammering on a chisel and a tiny piece hit him in the chest and he had a small "blood blister" but never had any real lingering pain, poor healing, discharge, or seeked care. Has had MRI before and this has never happened. He was told by meaghan that the MRI machine they were using were new and have stronger magnets. He reports was painful during MRI but is no longer. He can now feel a small firmness closer to surface he could not feel prior.  HTN-Above goal consistently. Denies frequent or current HA, intermittent blurry vision, dizziness, CP, or SOB.        Review of Systems   Constitutional: Negative for appetite change, chills, fever and unexpected weight change.   HENT: Negative for hearing loss, sore throat and trouble swallowing.    Eyes: Negative for visual disturbance.   Respiratory: Negative for cough, chest tightness and shortness of breath.    Cardiovascular: Negative for chest pain and leg swelling.   Gastrointestinal: Negative for abdominal pain, blood in stool, constipation, diarrhea, nausea and vomiting.   Endocrine: Negative for polydipsia and polyuria.   Genitourinary: Negative for decreased urine volume, difficulty urinating, dysuria, frequency and urgency.   Musculoskeletal: Negative for gait problem.   Skin: Negative for rash.   Neurological: Negative for dizziness and numbness.   Psychiatric/Behavioral: The patient is not nervous/anxious.        Objective:      Vitals:    06/10/19 1518   BP: (!) 141/94 " "  Pulse: 91   SpO2: 96%   Weight: 111.3 kg (245 lb 6 oz)   Height: 5' 9" (1.753 m)      Physical Exam   Constitutional: He is oriented to person, place, and time. He appears well-developed and well-nourished. He does not have a sickly appearance. No distress.   HENT:   Head: Normocephalic and atraumatic.   Eyes: Conjunctivae and EOM are normal. Right eye exhibits no discharge. Left eye exhibits no discharge. No scleral icterus.   Pulmonary/Chest: Effort normal. No respiratory distress.   Abdominal: Normal appearance. He exhibits no distension.   Neurological: He is alert and oriented to person, place, and time.   Skin: Skin is warm and dry. He is not diaphoretic.        Psychiatric: He has a normal mood and affect. His speech is normal.       Assessment:       1. Rash and nonspecific skin eruption    2. Foreign body (FB) in soft tissue    3. Benign essential hypertension    4. Severe obesity (BMI 35.0-39.9) with comorbidity        Plan:       Stevenson was seen today for diarrhea and dizziness.    Diagnoses and all orders for this visit:    Rash and nonspecific skin eruption  -     Ambulatory Referral to Dermatology    Foreign body (FB) in soft tissue  -     Ambulatory Referral to General Surgery    Benign essential hypertension  -     INCREASE valsartan (DIOVAN) 160 MG tablet; Take 1 tablet (160 mg total) by mouth once daily.   f/u in 3-4 weeks for nurse visit for BP check. Office and Emergency Department prompts discussed.    Obesity  Patient should eat food, not too much, and most should be vegetables and lean protein. Discussed goal of weight loss to be accomplished by calorie restriction to approx 0955-5260 calories/day. Cumulative psychical activity throughout your day does increase weight loss.  Vary sources in diet (ie different colored vegetables) along with adequate fiber discussed. Consistent and sustainable practices are key. Will review diet periodically to scan for potential for vitamin deficiencies. "   Discussed a minimum exercise goal of moderate paced walking or similar level of activity for 30-45 consecutive minutes 4-5 times a week for cardiovascular benefit and overall reduction in morbidity and mortality.                 Ty Whatley MD  Internal Medicine-[facility]        Side effects of medication(s) were discussed in detail and patient voiced understanding.  Patient will call back for any issues or complications.

## 2019-06-17 ENCOUNTER — HOSPITAL ENCOUNTER (EMERGENCY)
Facility: OTHER | Age: 54
Discharge: HOME | End: 2019-06-17
Attending: EMERGENCY MEDICINE
Payer: MEDICARE

## 2019-06-17 VITALS
DIASTOLIC BLOOD PRESSURE: 80 MMHG | RESPIRATION RATE: 18 BRPM | OXYGEN SATURATION: 99 % | HEIGHT: 69 IN | WEIGHT: 243 LBS | BODY MASS INDEX: 35.99 KG/M2 | SYSTOLIC BLOOD PRESSURE: 146 MMHG | TEMPERATURE: 98 F | HEART RATE: 64 BPM

## 2019-06-17 DIAGNOSIS — M62.830 SPASM OF MUSCLE OF LOWER BACK: Primary | ICD-10-CM

## 2019-06-17 PROCEDURE — 99284 EMERGENCY DEPT VISIT MOD MDM: CPT | Mod: 25,HCNC

## 2019-06-17 PROCEDURE — 63600175 PHARM REV CODE 636 W HCPCS: Mod: HCNC | Performed by: EMERGENCY MEDICINE

## 2019-06-17 PROCEDURE — 93010 ELECTROCARDIOGRAM REPORT: CPT | Mod: HCNC,,, | Performed by: INTERNAL MEDICINE

## 2019-06-17 PROCEDURE — 93005 ELECTROCARDIOGRAM TRACING: CPT | Mod: HCNC

## 2019-06-17 PROCEDURE — 96372 THER/PROPH/DIAG INJ SC/IM: CPT | Mod: HCNC

## 2019-06-17 PROCEDURE — 93010 EKG 12-LEAD: ICD-10-PCS | Mod: HCNC,,, | Performed by: INTERNAL MEDICINE

## 2019-06-17 RX ORDER — ORPHENADRINE CITRATE 30 MG/ML
60 INJECTION INTRAMUSCULAR; INTRAVENOUS
Status: COMPLETED | OUTPATIENT
Start: 2019-06-17 | End: 2019-06-17

## 2019-06-17 RX ORDER — IBUPROFEN 600 MG/1
600 TABLET ORAL EVERY 8 HOURS PRN
Qty: 30 TABLET | Refills: 0 | Status: SHIPPED | OUTPATIENT
Start: 2019-06-17 | End: 2023-03-17

## 2019-06-17 RX ORDER — KETOROLAC TROMETHAMINE 30 MG/ML
15 INJECTION, SOLUTION INTRAMUSCULAR; INTRAVENOUS
Status: COMPLETED | OUTPATIENT
Start: 2019-06-17 | End: 2019-06-17

## 2019-06-17 RX ORDER — CYCLOBENZAPRINE HCL 10 MG
10 TABLET ORAL 3 TIMES DAILY PRN
Qty: 20 TABLET | Refills: 0 | Status: SHIPPED | OUTPATIENT
Start: 2019-06-17 | End: 2019-06-22

## 2019-06-17 RX ADMIN — ORPHENADRINE CITRATE 60 MG: 60 INJECTION INTRAMUSCULAR; INTRAVENOUS at 01:06

## 2019-06-17 RX ADMIN — KETOROLAC TROMETHAMINE 15 MG: 30 INJECTION, SOLUTION INTRAMUSCULAR at 01:06

## 2019-06-17 NOTE — ED PROVIDER NOTES
"Encounter Date: 6/17/2019    SCRIBE #1 NOTE: I, Tiffanie Arcos, am scribing for, and in the presence of, Dr. Jacques.       History     Chief Complaint   Patient presents with    Back Pain     Pt reports when he stood up from a low seat on friday he instantly had left lumbar pain. He states he has previous back surgery.      Time seen by provider: 1:19 PM    This is a 54 y.o. male, with history of HTN and chronic back pain, who presents to the ED with complaint of left, lower back pain that began three days ago. Patient reports he was sitting on a kid's stool, he went to stand up and felt a sudden pain like something "snapped." Patient states the pain feels like a stabbing pain that has progressively worsened. Patient states icing has given him minimal relief, but denies relief with taking norco. Patient reports chronic back pain with radiation down both legs and numbness/tingling with herniated disc, that he receives nerve blocks and epidurals for from Dr. Sylvester. This pain does not radiate down the leg like his chronic LBP. The pain is sensitive to touch with swelling around the area. Patient denies taking NSAID's or anti-inflammatories.    The history is provided by the patient.     Review of patient's allergies indicates:   Allergen Reactions    Other omega-3s Hives     Pt reports allergic to either an anti-inflammatory or antibiotic. PT unsure of name     Past Medical History:   Diagnosis Date    Asthma     Hypertension      Past Surgical History:   Procedure Laterality Date    BACK SURGERY      COLONOSCOPY N/A 2/23/2017    Performed by Wil [facility]as MD at [facility] (4[facility] F[facility])    decompression neck  2005    HERNIA REPAIR  2008    NASAL SEPTUM SURGERY  2005     Family History   Problem Relation Age of Onset    Heart disease Father     Heart attack Father      Social History     Tobacco Use    Smoking status: Former Smoker    Smokeless tobacco: Never Used   Substance Use Topics    Alcohol " use: No    Drug use: Yes     Types: Hydrocodone     Review of Systems   Constitutional: Negative for fever.   HENT: Negative for sore throat.    Respiratory: Negative for shortness of breath.    Cardiovascular: Negative for chest pain.   Gastrointestinal: Negative for nausea.   Genitourinary: Negative for dysuria.   Musculoskeletal: Positive for back pain.        +Lower back swelling. Chronic bilateral leg pain.   Skin: Negative for rash.   Neurological: Negative for weakness.   Hematological: Does not bruise/bleed easily.       Physical Exam     Initial Vitals [06/17/19 1224]   BP Pulse Resp Temp SpO2   (!) 159/95 102 18 98.4 °F (36.9 °C) 98 %      MAP       --         Physical Exam    Nursing note and vitals reviewed.  Constitutional: He appears well-developed and well-nourished. He is not diaphoretic. No distress.   HENT:   Head: Normocephalic and atraumatic.   Eyes: Conjunctivae and EOM are normal. No scleral icterus.   Neck: Normal range of motion. Neck supple.   Cardiovascular: Normal rate, regular rhythm and normal heart sounds. Exam reveals no gallop and no friction rub.    No murmur heard.  Pulmonary/Chest: Breath sounds normal. No respiratory distress. He has no wheezes. He has no rhonchi. He has no rales.   Abdominal: Soft. Bowel sounds are normal. He exhibits no distension. There is no tenderness. There is no rebound and no guarding.   Musculoskeletal: Normal range of motion. He exhibits tenderness. He exhibits no edema.   Left lower lumbar paraspinal tenderness and spasm. No midline tenderness.   Lymphadenopathy:     He has no cervical adenopathy.   Neurological: He is alert and oriented to person, place, and time. He has normal strength. No cranial nerve deficit or sensory deficit.   Skin: Skin is warm and dry. No rash noted. No erythema. No pallor.   Psychiatric: He has a normal mood and affect. His behavior is normal. Judgment and thought content normal.         ED Course   Procedures  Labs Reviewed  - No data to display       Imaging Results          X-Ray Lumbar Spine Ap And Lateral (Final result)  Result time 06/17/19 14:22:09    Final result by Raza Wyatt Jr., MD (06/17/19 14:22:09)                 Impression:      Osteopenia and degenerative changes above.      Electronically signed by: Raza Wyatt MD  Date:    06/17/2019  Time:    14:22             Narrative:    EXAMINATION:  XR LUMBAR SPINE AP AND LATERAL    CLINICAL HISTORY:  Low back pain, minor trauma;    TECHNIQUE:  AP, lateral and spot images were performed of the lumbar spine.    COMPARISON:  None    FINDINGS:  Bones are slightly demineralized.  No compression fractures.  Anterior osteophytes noted.  Disc spaces are fairly well maintained.  Facet arthropathy noted.                              X-Rays:   Independently Interpreted Readings:   Other Readings:  XR Lumbar Spine: No fracture or misalignment. Lost of normal lordosis.    Medical Decision Making:   Initial Assessment:       55 y/o M with chronic LBP, HTN, presents with acute L LBP x 3 days. He is followed by pain management and gets regular epidural injections, and prior to onset was doing well with chronic sciatica pain that radiates down both his legs.  This pain started acutely when he stood up from a low position and heard a pop, and located in his left lumbar area with no radiation.  Pain was initially severe with sitting up or down or with movement, but has worsened since now he feels his left leg tight now with ambulation.  He takes Norco for chronic low back pain but this has not provided relief; he is not taking any NSAIDs or muscle relaxants.   On exam he has focal tenderness to left paraspinal lumbar muscle, with likely underlying spasm.  Neuro exam on arrival with no sensory deficits, and normal dorsiflexion and plantar flexion.  Left leg raise pain limited but strength appears to be intact. No incontinence or sacral numbness.    Patient treated with IM Toradol and  Norflex and observed.  No midline tenderness, but L-spine x-ray done since he heard a popping sensation, with no acute findings.  On reassessment patient feels somewhat improved, able to ambulate with less difficulty.  Repeat neuro exam with full left leg strength, no neuro deficits to warrant emergent MRI, no sign cauda equina syndrome.  Patient will take NSAIDs and Flexeril p.r.n., and Norco for breakthrough pain, though advised of sedative affects of Flexeril and will take cautiously and not at the same time as Norco.  He will follow up with pain management for reassessment within the next few days, and return to the ED for any worsening pain or left leg weakness or numbness.     Independently Interpreted Test(s):   I have ordered and independently interpreted X-rays - see prior notes.  Clinical Tests:   Radiological Study: Ordered and Reviewed            Scribe Attestation:   Scribe #1: I performed the above scribed service and the documentation accurately describes the services I performed. I attest to the accuracy of the note.    Attending Attestation:           Physician Attestation for Scribe:  Physician Attestation Statement for Scribe #1: I, Dr. Jacques, reviewed documentation, as scribed by Tiffanie Arcos in my presence, and it is both accurate and complete.                    Clinical Impression:     1. Spasm of muscle of lower back                          Aníbal Jacques MD  06/20/19 0639

## 2019-06-17 NOTE — ED NOTES
Ambulated pt. Pt reports improvement in pain but that pain is still intense. Pt able to ambulate small distances. Reports pain with wt bearing but reports an overall improvement.

## 2019-06-17 NOTE — ED TRIAGE NOTES
"+left lower back pain since Friday. Pt states " I was sitting on a low stool playing with my grand kids and I heard something pop. I have a hx of chronic back pain so I took the norco's that I am usually prescribed but it hasn't helped. I thought I pulled a muscle and waited for the pain to go away but it didn't " pt recently had nerve block 3 weeks ago. Pt reports numbness/tingling in BLE but that is normal stating " I've been having that".   "

## 2019-06-24 ENCOUNTER — CLINICAL SUPPORT (OUTPATIENT)
Dept: INTERNAL MEDICINE | Facility: CLINIC | Age: 54
End: 2019-06-24
Payer: MEDICARE

## 2019-06-24 VITALS — HEART RATE: 98 BPM | SYSTOLIC BLOOD PRESSURE: 110 MMHG | OXYGEN SATURATION: 95 % | DIASTOLIC BLOOD PRESSURE: 82 MMHG

## 2019-06-24 PROCEDURE — 99999 PR PBB SHADOW E&M-EST. PATIENT-LVL II: ICD-10-PCS | Mod: PBBFAC,HCNC,,

## 2019-06-24 PROCEDURE — 99999 PR PBB SHADOW E&M-EST. PATIENT-LVL II: CPT | Mod: PBBFAC,HCNC,,

## 2019-06-24 NOTE — PROGRESS NOTES
Stevenson SHARI Robison Jr. 54 y.o. male is here today for Blood Pressure check.   History of HTN yes.    Review of patient's allergies indicates:   Allergen Reactions    Other omega-3s Hives     Pt reports allergic to either an anti-inflammatory or antibiotic. PT unsure of name     Creatinine   Date Value Ref Range Status   10/18/2018 0.9 0.5 - 1.4 mg/dL Final     Sodium   Date Value Ref Range Status   10/18/2018 139 136 - 145 mmol/L Final     Potassium   Date Value Ref Range Status   10/18/2018 4.1 3.5 - 5.1 mmol/L Final   ]  Patient verifies taking blood pressure medications on a regular bases at the same time of the day.     Current Outpatient Medications:     albuterol 90 mcg/actuation inhaler, Inhale 2 puffs into the lungs every 6 (six) hours as needed for Wheezing., Disp: 1 each, Rfl: 11    aspirin 81 MG Chew, Take 81 mg by mouth once daily., Disp: , Rfl:     hydroCHLOROthiazide (HYDRODIURIL) 12.5 MG Tab, TAKE 1 TABLET (12.5 MG TOTAL) BY MOUTH ONCE DAILY., Disp: 90 tablet, Rfl: 2    hydrocodone-acetaminophen 10-325mg (NORCO)  mg Tab, Take 1 tablet by mouth 3 (three) times daily as needed., Disp: , Rfl:     ibuprofen (ADVIL,MOTRIN) 600 MG tablet, Take 1 tablet (600 mg total) by mouth every 8 (eight) hours as needed for Pain., Disp: 30 tablet, Rfl: 0    levoFLOXacin (LEVAQUIN) 500 MG tablet, Take 500 mg by mouth once daily., Disp: , Rfl: 0    promethazine-dextromethorphan (PROMETHAZINE-DM) 6.25-15 mg/5 mL Syrp, TAKE 5 MLS BY MOUTH EVERY 12 HOURS AS NEEDED FOR COUGH, Disp: , Rfl: 0    trazodone (DESYREL) 50 MG tablet, TAKE 1 TABLET(S) EVERY DAY BY ORAL ROUTE FOR 30 DAYS., Disp: , Rfl: 3    triamcinolone acetonide 0.1% (KENALOG) 0.1 % cream, Apply topically 2 (two) times daily., Disp: 45 g, Rfl: 1    valsartan (DIOVAN) 160 MG tablet, Take 1 tablet (160 mg total) by mouth once daily., Disp: 90 tablet, Rfl: 3  Does patient have record of home blood pressure readings no.   Last dose of blood pressure  medication was taken last night 06/23.  Patient is asymptomatic.  B/P 110/80 and automatic 127/92  Blood pressure reading after 15 minutes was  BP: 110/82 , Pulse: 98.  Dr. Rodríguez notified.

## 2019-06-24 NOTE — Clinical Note
Does patient have record of home blood pressure readings no. Last dose of blood pressure medication was taken last night 06/23.Patient is asymptomatic.B/P 110/80 and automatic 127/92Blood pressure reading after 15 minutes wasBP: 110/82 , Pulse: 98.Dr. Rodríguez notified.

## 2019-07-01 ENCOUNTER — TELEPHONE (OUTPATIENT)
Dept: INTERNAL MEDICINE | Facility: CLINIC | Age: 54
End: 2019-07-01

## 2019-07-01 NOTE — TELEPHONE ENCOUNTER
----- Message from Josefa Grant sent at 7/1/2019  3:54 PM CDT -----  Contact: Pt self Mobile/Home [phone]   Patient is calling in regards to letting you know that he stop taking his script valsartan (DIOVAN) 160 MG tablet on Friday. He said that every time he took this medication a hour later he would feel dizzy, he had shortness of breath and he felt as if he was going to pass out. He said when he stop taking the medication the symptoms went away but his pressure was stable. He would like for you to give him a call to speak with you about this please.

## 2019-07-01 NOTE — TELEPHONE ENCOUNTER
Rec patient monitor and record BP at various times of day and send pic to me via [employer] as previously discussed during our visit. please remind pt about proper BP measuring techniques

## 2019-07-02 ENCOUNTER — TELEPHONE (OUTPATIENT)
Dept: INTERNAL MEDICINE | Facility: CLINIC | Age: 54
End: 2019-07-02

## 2019-07-02 NOTE — TELEPHONE ENCOUNTER
Call placed to patient regarding blood pressure management per MD's order. Patient was advised to record blood pressures at various times per day and send picture of blood pressure recordings via My chart as it was previously discussed at last clinic visit with Dr. Rodríguez. Patient stated that his only means of taking blood pressures is through [employer] or [facility] pharmacies. Last blood pressure recording by patient was 102/98 on 6/30/19. The patient verbalized understanding and had no further questions or concerns.    Kayleen Bynum MA

## 2019-08-15 ENCOUNTER — PES CALL (OUTPATIENT)
Dept: ADMINISTRATIVE | Facility: CLINIC | Age: 54
End: 2019-08-15

## 2019-10-08 ENCOUNTER — PATIENT OUTREACH (OUTPATIENT)
Dept: ADMINISTRATIVE | Facility: OTHER | Age: 54
End: 2019-10-08

## 2019-10-10 ENCOUNTER — INITIAL CONSULT (OUTPATIENT)
Dept: DERMATOLOGY | Facility: CLINIC | Age: 54
End: 2019-10-10
Attending: INTERNAL MEDICINE
Payer: MEDICARE

## 2019-10-10 DIAGNOSIS — D23.5 EPIDERMAL NEVUS OF TRUNK: Primary | ICD-10-CM

## 2019-10-10 DIAGNOSIS — L82.1 SEBORRHEIC KERATOSIS: ICD-10-CM

## 2019-10-10 DIAGNOSIS — L81.4 LENTIGINES: ICD-10-CM

## 2019-10-10 DIAGNOSIS — L57.8 ACTINIC ELASTOSIS: ICD-10-CM

## 2019-10-10 PROCEDURE — 99203 OFFICE O/P NEW LOW 30 MIN: CPT | Mod: S$GLB,,, | Performed by: DERMATOLOGY

## 2019-10-10 PROCEDURE — 99203 PR OFFICE/OUTPT VISIT, NEW, LEVL III, 30-44 MIN: ICD-10-PCS | Mod: S$GLB,,, | Performed by: DERMATOLOGY

## 2019-10-10 NOTE — PROGRESS NOTES
"  Subjective:       Patient ID:  Stevenson Robison Jr. is a 54 y.o. male who presents for   Chief Complaint   Patient presents with    Mole     diffuse, no change    Lesion     L forarm, scaly    Spot     left ear, scaly      Mole  - Initial  Affected locations: back  Duration: since he was young, 30+ years.  Signs / symptoms: asymptomatic  Severity: mild  Timing: constant  Aggravated by: nothing  Relieving factors/Treatments tried: nothing    Lesion  - Initial  Affected locations: left arm  Duration: 1 month  Signs / symptoms: scaling  Severity: mild  Timing: constant  Aggravated by: nothing    Spot  - Initial  Affected locations: left ear  Duration: 6 months  Signs / symptoms: dryness ("has lines in it")  Severity: mild  Timing: constant  Exacerbated by: noticed after a sunburn.  Relieving factors/Treatments tried: nothing      Review of Systems   Skin: Negative for itching and rash.   Hematologic/Lymphatic: Does not bruise/bleed easily.        Objective:    Physical Exam   Constitutional: He appears well-developed and well-nourished. No distress.   HENT:   Mouth/Throat: Lips normal.    Eyes: Lids are normal.  No conjunctival no injection.   Neurological: He is alert and oriented to person, place, and time. He is not disoriented.   Psychiatric: He has a normal mood and affect.   Skin:   Areas Examined (abnormalities noted in diagram):   Scalp / Hair Palpated and Inspected  Head / Face Inspection Performed  Neck Inspection Performed  Chest / Axilla Inspection Performed  Abdomen Inspection Performed  Back Inspection Performed  RUE Inspected  LUE Inspection Performed  RLE Inspected  LLE Inspection Performed  Nails and Digits Inspection Performed                   Diagram Legend     Erythematous scaling macule/papule c/w actinic keratosis       Vascular papule c/w angioma      Pigmented verrucoid papule/plaque c/w seborrheic keratosis      Yellow umbilicated papule c/w sebaceous hyperplasia      Irregularly shaped tan " macule c/w lentigo     1-2 mm smooth white papules consistent with Milia      Movable subcutaneous cyst with punctum c/w epidermal inclusion cyst      Subcutaneous movable cyst c/w pilar cyst      Firm pink to brown papule c/w dermatofibroma      Pedunculated fleshy papule(s) c/w skin tag(s)      Evenly pigmented macule c/w junctional nevus     Mildly variegated pigmented, slightly irregular-bordered macule c/w mildly atypical nevus      Flesh colored to evenly pigmented papule c/w intradermal nevus       Pink pearly papule/plaque c/w basal cell carcinoma      Erythematous hyperkeratotic cursted plaque c/w SCC      Surgical scar with no sign of skin cancer recurrence      Open and closed comedones      Inflammatory papules and pustules      Verrucoid papule consistent consistent with wart     Erythematous eczematous patches and plaques     Dystrophic onycholytic nail with subungual debris c/w onychomycosis     Umbilicated papule    Erythematous-base heme-crusted tan verrucoid plaque consistent with inflamed seborrheic keratosis     Erythematous Silvery Scaling Plaque c/w Psoriasis     See annotation      Assessment / Plan:        Epidermal nevus of trunk  Reassurance was given to the patient. No treatment is necessary.    Seborrheic keratosis  These are benign, inherited growths without a malignant potential. Reassurance given to patient. No treatment is necessary. Handout was provided.    Actinic elastosis  Patient instructed on importance of daily sun protection with a broad-spectrum sunscreen of SPF 30 or higher. Sun avoidance and topical protection discussed.   Patient encouraged to wear hat for all outdoor exposure. Also discussed sun protective clothing.    Lentigines  These are benign sun spots which should be monitored for changes. Daily sun protection will reduce the number of new lesions.   Patient instructed in importance of daily broad-spectrum sunscreen use with SPF of at least 30. Sun avoidance and  topical protection/protective clothing discussed.    Follow up in about 1 year (around 10/10/2020) for TBSE, or sooner if any new problems or changing lesions.

## 2019-10-10 NOTE — LETTER
October 10, 2019      Ty Rodríguez MD  [address]  [address]  [facility] [address]           [facility] - Dermatology  [address] [place] [id number]  Phone: [phone]  Fax: [phone]          Patient: Stevenson Robison Jr.   MR Number: [id number]   YOB: 1965   Date of Visit: 10/10/2019       Dear Dr. Ty Rodríguez:    Thank you for referring Stevenson Robison to me for evaluation. Attached you will find relevant portions of my assessment and plan of care.    If you have questions, please do not hesitate to call me. I look forward to following Stevenson Robison along with you.    Sincerely,    Gertrude Alaniz MD    Enclosure  CC:  No Recipients    If you would like to receive this communication electronically, please contact [email or web address] or [phone] to request more information on [employer] access.    For providers and/or their staff who would like to refer a patient to [facility], please contact us through our one-stop-shop provider referral line, [facility], at [phone].    If you feel you have received this communication in error or would no longer like to receive these types of communications, please e-mail [email or web address]

## 2019-10-29 ENCOUNTER — TELEPHONE (OUTPATIENT)
Dept: CARDIOLOGY | Facility: CLINIC | Age: 54
End: 2019-10-29

## 2019-10-29 DIAGNOSIS — R00.2 PALPITATIONS: Primary | ICD-10-CM

## 2019-11-14 ENCOUNTER — OFFICE VISIT (OUTPATIENT)
Dept: CARDIOLOGY | Facility: CLINIC | Age: 54
End: 2019-11-14
Payer: MEDICARE

## 2019-11-14 ENCOUNTER — TELEPHONE (OUTPATIENT)
Dept: CARDIOLOGY | Facility: CLINIC | Age: 54
End: 2019-11-14

## 2019-11-14 VITALS
SYSTOLIC BLOOD PRESSURE: 143 MMHG | BODY MASS INDEX: 37.22 KG/M2 | HEART RATE: 75 BPM | WEIGHT: 251.31 LBS | DIASTOLIC BLOOD PRESSURE: 92 MMHG | HEIGHT: 69 IN

## 2019-11-14 DIAGNOSIS — I10 BENIGN ESSENTIAL HYPERTENSION: Primary | ICD-10-CM

## 2019-11-14 DIAGNOSIS — R00.2 PALPITATIONS: Primary | ICD-10-CM

## 2019-11-14 DIAGNOSIS — E78.5 DYSLIPIDEMIA: Chronic | ICD-10-CM

## 2019-11-14 DIAGNOSIS — E66.01 SEVERE OBESITY (BMI 35.0-39.9) WITH COMORBIDITY: ICD-10-CM

## 2019-11-14 PROCEDURE — 99999 PR PBB SHADOW E&M-EST. PATIENT-LVL III: CPT | Mod: PBBFAC,HCNC,, | Performed by: INTERNAL MEDICINE

## 2019-11-14 PROCEDURE — 99204 PR OFFICE/OUTPT VISIT, NEW, LEVL IV, 45-59 MIN: ICD-10-PCS | Mod: HCNC,S$GLB,, | Performed by: INTERNAL MEDICINE

## 2019-11-14 PROCEDURE — 3008F BODY MASS INDEX DOCD: CPT | Mod: HCNC,CPTII,S$GLB, | Performed by: INTERNAL MEDICINE

## 2019-11-14 PROCEDURE — 99204 OFFICE O/P NEW MOD 45 MIN: CPT | Mod: HCNC,S$GLB,, | Performed by: INTERNAL MEDICINE

## 2019-11-14 PROCEDURE — 3080F DIAST BP >= 90 MM HG: CPT | Mod: HCNC,CPTII,S$GLB, | Performed by: INTERNAL MEDICINE

## 2019-11-14 PROCEDURE — 3008F PR BODY MASS INDEX (BMI) DOCUMENTED: ICD-10-PCS | Mod: HCNC,CPTII,S$GLB, | Performed by: INTERNAL MEDICINE

## 2019-11-14 PROCEDURE — 3077F SYST BP >= 140 MM HG: CPT | Mod: HCNC,CPTII,S$GLB, | Performed by: INTERNAL MEDICINE

## 2019-11-14 PROCEDURE — 3077F PR MOST RECENT SYSTOLIC BLOOD PRESSURE >= 140 MM HG: ICD-10-PCS | Mod: HCNC,CPTII,S$GLB, | Performed by: INTERNAL MEDICINE

## 2019-11-14 PROCEDURE — 3080F PR MOST RECENT DIASTOLIC BLOOD PRESSURE >= 90 MM HG: ICD-10-PCS | Mod: HCNC,CPTII,S$GLB, | Performed by: INTERNAL MEDICINE

## 2019-11-14 PROCEDURE — 99999 PR PBB SHADOW E&M-EST. PATIENT-LVL III: ICD-10-PCS | Mod: PBBFAC,HCNC,, | Performed by: INTERNAL MEDICINE

## 2019-11-14 RX ORDER — HYDROCHLOROTHIAZIDE 25 MG/1
25 TABLET ORAL DAILY
Qty: 90 TABLET | Refills: 3 | Status: SHIPPED | OUTPATIENT
Start: 2019-11-14 | End: 2021-02-06

## 2019-11-14 NOTE — PROGRESS NOTES
"Subjective:   Patient ID:  Stevenson Robison Jr. is a 54 y.o. male who presents for follow up of Hypertension      HPI: Very pleasant man last seen by me on August 20, 2013.  He weighed 218 that day and over the last 6 years he's been as low as 168, but he's gained a bunch back.  He's 251 today and he says that's down from a peak of 270.    BP was recently elevated at the drug store (140s/100) and this prompted him to seek care again.    He was on valsartan briefly but he felt short of breath and lightheaded.  He stopped and it went away.  He's only on HCTZ 12.5 now.    He's not on the atorvastatin 20 that I prescribed back in 2013.    Exercise: "I do a lot of walking"    No excessive daytime sleepiness.  Wife says he snores a bunch but doesn't have obvious apneic episodes.    Patient Active Problem List   Diagnosis    Dyslipidemia    Lumbar back pain with radiculopathy affecting bilateral lower extremity    Severe obesity (BMI 35.0-39.9) with comorbidity    Mild persistent asthma without complication    Benign essential hypertension    Oral lesion       Current Outpatient Medications   Medication Sig    albuterol 90 mcg/actuation inhaler Inhale 2 puffs into the lungs every 6 (six) hours as needed for Wheezing.    aspirin 81 MG Chew Take 81 mg by mouth once daily.    hydroCHLOROthiazide (HYDRODIURIL) 25 MG tablet Take 1 tablet (25 mg total) by mouth once daily.    hydrocodone-acetaminophen 10-325mg (NORCO)  mg Tab Take 1 tablet by mouth 3 (three) times daily as needed.    trazodone (DESYREL) 50 MG tablet TAKE 1 TABLET(S) EVERY DAY BY ORAL ROUTE FOR 30 DAYS.    ibuprofen (ADVIL,MOTRIN) 600 MG tablet Take 1 tablet (600 mg total) by mouth every 8 (eight) hours as needed for Pain. (Patient not taking: Reported on 11/14/2019)    levoFLOXacin (LEVAQUIN) 500 MG tablet Take 500 mg by mouth once daily.    promethazine-dextromethorphan (PROMETHAZINE-DM) 6.25-15 mg/5 mL Syrp TAKE 5 MLS BY MOUTH EVERY 12 HOURS " AS NEEDED FOR COUGH    triamcinolone acetonide 0.1% (KENALOG) 0.1 % cream Apply topically 2 (two) times daily.     No current facility-administered medications for this visit.        Review of Systems   Constitution: Negative.   HENT: Negative.    Eyes: Negative.    Cardiovascular: Negative.  Negative for chest pain, dyspnea on exertion, near-syncope, orthopnea and palpitations.   Respiratory: Negative.  Negative for cough and shortness of breath.    Endocrine: Negative.    Hematologic/Lymphatic: Negative.    Skin: Negative.    Musculoskeletal: Negative.    Gastrointestinal: Negative.    Genitourinary: Negative.    Neurological: Negative.    Psychiatric/Behavioral: Negative.      Objective:   Physical Exam   Constitutional: He is oriented to person, place, and time. He appears well-developed and well-nourished.   HENT:   Head: Normocephalic and atraumatic.   Mouth/Throat: Oropharynx is clear and moist.   Eyes: Conjunctivae and EOM are normal. No scleral icterus.   Neck: Normal range of motion. Neck supple. No JVD present.   Cardiovascular: Normal rate, regular rhythm, normal heart sounds and intact distal pulses. Exam reveals no gallop and no friction rub.   No murmur heard.  Pulmonary/Chest: Effort normal and breath sounds normal. He has no wheezes. He has no rales.   Abdominal: Soft. Bowel sounds are normal. He exhibits no distension. There is no tenderness.   Musculoskeletal: Normal range of motion. He exhibits no edema.   Neurological: He is alert and oriented to person, place, and time.   Skin: Skin is warm and dry. No rash noted. No erythema.   Psychiatric: He has a normal mood and affect. His behavior is normal. Judgment and thought content normal.   Vitals reviewed.      Lab Results   Component Value Date    WBC 6.25 10/18/2018    HGB 15.0 10/18/2018    HCT 45.3 10/18/2018    MCV 93 10/18/2018     10/18/2018         Chemistry        Component Value Date/Time     10/18/2018 1341    K 4.1  10/18/2018 1341     10/18/2018 1341    CO2 29 10/18/2018 1341    BUN 14 10/18/2018 1341    CREATININE 0.9 10/18/2018 1341    GLU 87 10/18/2018 1341        Component Value Date/Time    CALCIUM 9.6 10/18/2018 1341    ALKPHOS 64 10/18/2018 1341    AST 21 10/18/2018 1341    ALT 19 10/18/2018 1341    BILITOT 0.6 10/18/2018 1341    ESTGFRAFRICA >60 10/18/2018 1341    EGFRNONAA >60 10/18/2018 1341            Lab Results   Component Value Date    CHOL 233 (H) 10/18/2018    CHOL 176 04/08/2014    CHOL 234 (H) 07/09/2013     Lab Results   Component Value Date    HDL 48 10/18/2018    HDL 43 04/08/2014    HDL 40 07/09/2013     Lab Results   Component Value Date    LDLCALC 150.4 10/18/2018    LDLCALC 98.2 04/08/2014    LDLCALC 167.0 (H) 07/09/2013     Lab Results   Component Value Date    TRIG 173 (H) 10/18/2018    TRIG 174 (H) 04/08/2014    TRIG 133 07/09/2013     Lab Results   Component Value Date    CHOLHDL 20.6 10/18/2018    CHOLHDL 24.4 04/08/2014    CHOLHDL 17.1 (L) 07/09/2013       Lab Results   Component Value Date    TSH 0.612 10/18/2018       Lab Results   Component Value Date    HGBA1C 5.4 10/18/2018       Assessment:     1. Benign essential hypertension    2. Severe obesity (BMI 35.0-39.9) with comorbidity    3. Dyslipidemia        Plan:     Continue current medicines.  Increase HCTZ to 25mg daily.  Enroll in digital HTN.  I'm sending them to the [facility] to get their login on [facility] straight.    Weight loss strongly advised.    No intervention on his lipids beyond diet/weight loss at this point.    Diet/exercise goals reinforced.    F/U 12 months

## 2020-01-23 ENCOUNTER — OFFICE VISIT (OUTPATIENT)
Dept: INTERNAL MEDICINE | Facility: CLINIC | Age: 55
End: 2020-01-23
Attending: INTERNAL MEDICINE
Payer: MEDICARE

## 2020-01-23 ENCOUNTER — LAB VISIT (OUTPATIENT)
Dept: LAB | Facility: OTHER | Age: 55
End: 2020-01-23
Attending: INTERNAL MEDICINE
Payer: MEDICARE

## 2020-01-23 VITALS
DIASTOLIC BLOOD PRESSURE: 84 MMHG | SYSTOLIC BLOOD PRESSURE: 132 MMHG | HEIGHT: 69 IN | BODY MASS INDEX: 36.8 KG/M2 | OXYGEN SATURATION: 99 % | HEART RATE: 78 BPM | WEIGHT: 248.44 LBS

## 2020-01-23 DIAGNOSIS — E78.5 DYSLIPIDEMIA: Chronic | ICD-10-CM

## 2020-01-23 DIAGNOSIS — Z11.4 ENCOUNTER FOR SCREENING FOR HIV: ICD-10-CM

## 2020-01-23 DIAGNOSIS — K21.9 GASTROESOPHAGEAL REFLUX DISEASE, ESOPHAGITIS PRESENCE NOT SPECIFIED: Primary | ICD-10-CM

## 2020-01-23 DIAGNOSIS — K43.9 VENTRAL HERNIA WITHOUT OBSTRUCTION OR GANGRENE: ICD-10-CM

## 2020-01-23 DIAGNOSIS — R79.9 ABNORMAL FINDING OF BLOOD CHEMISTRY, UNSPECIFIED: ICD-10-CM

## 2020-01-23 DIAGNOSIS — E66.01 SEVERE OBESITY (BMI 35.0-39.9) WITH COMORBIDITY: ICD-10-CM

## 2020-01-23 DIAGNOSIS — I10 BENIGN ESSENTIAL HYPERTENSION: ICD-10-CM

## 2020-01-23 DIAGNOSIS — J45.30 MILD PERSISTENT ASTHMA WITHOUT COMPLICATION: ICD-10-CM

## 2020-01-23 LAB
ALBUMIN SERPL BCP-MCNC: 4 G/DL (ref 3.5–5.2)
ALP SERPL-CCNC: 65 U/L (ref 55–135)
ALT SERPL W/O P-5'-P-CCNC: 21 U/L (ref 10–44)
ANION GAP SERPL CALC-SCNC: 11 MMOL/L (ref 8–16)
AST SERPL-CCNC: 18 U/L (ref 10–40)
BASOPHILS # BLD AUTO: 0.04 K/UL (ref 0–0.2)
BASOPHILS NFR BLD: 0.6 % (ref 0–1.9)
BILIRUB SERPL-MCNC: 0.4 MG/DL (ref 0.1–1)
BUN SERPL-MCNC: 12 MG/DL (ref 6–20)
CALCIUM SERPL-MCNC: 9.8 MG/DL (ref 8.7–10.5)
CHLORIDE SERPL-SCNC: 98 MMOL/L (ref 95–110)
CO2 SERPL-SCNC: 30 MMOL/L (ref 23–29)
CREAT SERPL-MCNC: 0.9 MG/DL (ref 0.5–1.4)
DIFFERENTIAL METHOD: NORMAL
EOSINOPHIL # BLD AUTO: 0.1 K/UL (ref 0–0.5)
EOSINOPHIL NFR BLD: 1.8 % (ref 0–8)
ERYTHROCYTE [DISTWIDTH] IN BLOOD BY AUTOMATED COUNT: 12.1 % (ref 11.5–14.5)
EST. GFR  (AFRICAN AMERICAN): >60 ML/MIN/1.73 M^2
EST. GFR  (NON AFRICAN AMERICAN): >60 ML/MIN/1.73 M^2
GLUCOSE SERPL-MCNC: 81 MG/DL (ref 70–110)
HCT VFR BLD AUTO: 47.6 % (ref 40–54)
HGB BLD-MCNC: 15.3 G/DL (ref 14–18)
IMM GRANULOCYTES # BLD AUTO: 0.01 K/UL (ref 0–0.04)
IMM GRANULOCYTES NFR BLD AUTO: 0.2 % (ref 0–0.5)
LYMPHOCYTES # BLD AUTO: 2.2 K/UL (ref 1–4.8)
LYMPHOCYTES NFR BLD: 33 % (ref 18–48)
MCH RBC QN AUTO: 29.9 PG (ref 27–31)
MCHC RBC AUTO-ENTMCNC: 32.1 G/DL (ref 32–36)
MCV RBC AUTO: 93 FL (ref 82–98)
MONOCYTES # BLD AUTO: 0.6 K/UL (ref 0.3–1)
MONOCYTES NFR BLD: 9.1 % (ref 4–15)
NEUTROPHILS # BLD AUTO: 3.6 K/UL (ref 1.8–7.7)
NEUTROPHILS NFR BLD: 55.3 % (ref 38–73)
NRBC BLD-RTO: 0 /100 WBC
PLATELET # BLD AUTO: 309 K/UL (ref 150–350)
PMV BLD AUTO: 10.5 FL (ref 9.2–12.9)
POTASSIUM SERPL-SCNC: 3.5 MMOL/L (ref 3.5–5.1)
PROT SERPL-MCNC: 7.3 G/DL (ref 6–8.4)
RBC # BLD AUTO: 5.12 M/UL (ref 4.6–6.2)
SODIUM SERPL-SCNC: 139 MMOL/L (ref 136–145)
TSH SERPL DL<=0.005 MIU/L-ACNC: 0.52 UIU/ML (ref 0.4–4)
WBC # BLD AUTO: 6.58 K/UL (ref 3.9–12.7)

## 2020-01-23 PROCEDURE — 99499 UNLISTED E&M SERVICE: CPT | Mod: HCNC,S$GLB,, | Performed by: INTERNAL MEDICINE

## 2020-01-23 PROCEDURE — 99999 PR PBB SHADOW E&M-EST. PATIENT-LVL IV: CPT | Mod: PBBFAC,HCNC,, | Performed by: INTERNAL MEDICINE

## 2020-01-23 PROCEDURE — 84443 ASSAY THYROID STIM HORMONE: CPT | Mod: HCNC

## 2020-01-23 PROCEDURE — 86703 HIV-1/HIV-2 1 RESULT ANTBDY: CPT | Mod: HCNC

## 2020-01-23 PROCEDURE — 3008F PR BODY MASS INDEX (BMI) DOCUMENTED: ICD-10-PCS | Mod: HCNC,CPTII,S$GLB, | Performed by: INTERNAL MEDICINE

## 2020-01-23 PROCEDURE — 99214 PR OFFICE/OUTPT VISIT, EST, LEVL IV, 30-39 MIN: ICD-10-PCS | Mod: HCNC,S$GLB,, | Performed by: INTERNAL MEDICINE

## 2020-01-23 PROCEDURE — 3079F DIAST BP 80-89 MM HG: CPT | Mod: HCNC,CPTII,S$GLB, | Performed by: INTERNAL MEDICINE

## 2020-01-23 PROCEDURE — 83036 HEMOGLOBIN GLYCOSYLATED A1C: CPT | Mod: HCNC

## 2020-01-23 PROCEDURE — 80053 COMPREHEN METABOLIC PANEL: CPT | Mod: HCNC

## 2020-01-23 PROCEDURE — 99999 PR PBB SHADOW E&M-EST. PATIENT-LVL IV: ICD-10-PCS | Mod: PBBFAC,HCNC,, | Performed by: INTERNAL MEDICINE

## 2020-01-23 PROCEDURE — 3075F SYST BP GE 130 - 139MM HG: CPT | Mod: HCNC,CPTII,S$GLB, | Performed by: INTERNAL MEDICINE

## 2020-01-23 PROCEDURE — 80061 LIPID PANEL: CPT | Mod: HCNC

## 2020-01-23 PROCEDURE — 99499 RISK ADDL DX/OHS AUDIT: ICD-10-PCS | Mod: HCNC,S$GLB,, | Performed by: INTERNAL MEDICINE

## 2020-01-23 PROCEDURE — 3079F PR MOST RECENT DIASTOLIC BLOOD PRESSURE 80-89 MM HG: ICD-10-PCS | Mod: HCNC,CPTII,S$GLB, | Performed by: INTERNAL MEDICINE

## 2020-01-23 PROCEDURE — 85025 COMPLETE CBC W/AUTO DIFF WBC: CPT | Mod: HCNC

## 2020-01-23 PROCEDURE — 99214 OFFICE O/P EST MOD 30 MIN: CPT | Mod: HCNC,S$GLB,, | Performed by: INTERNAL MEDICINE

## 2020-01-23 PROCEDURE — 36415 COLL VENOUS BLD VENIPUNCTURE: CPT | Mod: HCNC

## 2020-01-23 PROCEDURE — 3075F PR MOST RECENT SYSTOLIC BLOOD PRESS GE 130-139MM HG: ICD-10-PCS | Mod: HCNC,CPTII,S$GLB, | Performed by: INTERNAL MEDICINE

## 2020-01-23 PROCEDURE — 3008F BODY MASS INDEX DOCD: CPT | Mod: HCNC,CPTII,S$GLB, | Performed by: INTERNAL MEDICINE

## 2020-01-23 RX ORDER — PANTOPRAZOLE SODIUM 40 MG/1
40 TABLET, DELAYED RELEASE ORAL
Qty: 90 TABLET | Refills: 2 | Status: SHIPPED | OUTPATIENT
Start: 2020-01-23 | End: 2021-03-08

## 2020-01-23 NOTE — PROGRESS NOTES
"Subjective:       Patient ID: Stevenson Robison Jr. is a 54 y.o. male.    Chief Complaint: Mass    Here for urgent visit    Lump in stomach. Zantac recalled. Lump occurs when leaning forward and coughing and going from seated to standing. No pain. Dry nagging ocugh that is worse at night is back. Long Hx of difficult to control heartburn. Discuss reveals he eats dinner early in the evening but lays down on to couch immediately afterwards. He does have chronic rhinorrhea and sneezing.         Review of Systems    Objective:      Vitals:    01/23/20 1403 01/23/20 1435   BP: (!) 141/88 132/84   Pulse: 78    SpO2: 99%    Weight: 112.7 kg (248 lb 7.3 oz)    Height: 5' 9" (1.753 m)       Physical Exam   Constitutional: He is oriented to person, place, and time. He appears well-developed and well-nourished. No distress.   HENT:   Head: Normocephalic and atraumatic.   Mouth/Throat: Oropharynx is clear and moist. No oropharyngeal exudate.   Eyes: Pupils are equal, round, and reactive to light. Conjunctivae and EOM are normal. No scleral icterus.   Neck: No thyromegaly present.   Cardiovascular: Normal rate, regular rhythm and normal heart sounds.   No murmur heard.  Pulmonary/Chest: Effort normal and breath sounds normal. He has no wheezes. He has no rales.   Abdominal: Soft. He exhibits no distension and no mass. There is no tenderness. There is no rigidity and no guarding. A hernia is present. Hernia confirmed positive in the ventral area.   Musculoskeletal: He exhibits no edema or tenderness.   Lymphadenopathy:     He has no cervical adenopathy.   Neurological: He is alert and oriented to person, place, and time.   Skin: Skin is warm and dry.   Psychiatric: He has a normal mood and affect. His behavior is normal.       Assessment:       1. Gastroesophageal reflux disease, esophagitis presence not specified    2. Ventral hernia without obstruction or gangrene    3. Benign essential hypertension    4. Mild persistent asthma " without complication    5. Severe obesity (BMI 35.0-39.9) with comorbidity    6. Dyslipidemia    7. Abnormal finding of blood chemistry, unspecified     8. Encounter for screening for HIV        Plan:       Stevenson was seen today for mass.    Diagnoses and all orders for this visit:    Gastroesophageal reflux disease, esophagitis presence not specified    Ventral hernia without obstruction or gangrene   He wants to mass sure it is not a mass. With is Hx of GERD GI is our better bet as he is not interested in surgical management of hernia and want to discuss concern about zantac and hiatal hernia   Lifestyle modifications and timing of lying down after meals discussed.  .-     Ambulatory Referral to Gastroenterology    Benign essential hypertension  -     TSH; Future    Mild persistent asthma without complication   I do not believe his cough is asthma. I believe it is LPR. Office and Emergency Department prompts discussed.    Severe obesity (BMI 35.0-39.9) with comorbidity   Patient should eat food, not too much, and most should be vegetables and lean protein. Discussed goal of weight loss to be accomplished by calorie restriction to approx 6173-3047 calories/day. Cumulative psychical activity throughout your day does increase weight loss.  Vary sources in diet (ie different colored vegetables) along with adequate fiber discussed. Consistent and sustainable practices are key. Will review diet periodically to scan for potential for vitamin deficiencies.   Discussed a minimum exercise goal of moderate paced walking or similar level of activity for 30-45 consecutive minutes 4-5 times a week for cardiovascular benefit and overall reduction in morbidity and mortality.       Weight loss    Dyslipidemia  -     Lipid panel; Future  -     TSH; Future  -     Comprehensive metabolic panel; Future  -     CBC auto differential; Future  -     Hemoglobin A1c; Future    Abnormal finding of blood chemistry, unspecified   -      Hemoglobin A1c; Future    Encounter for screening for HIV  -     HIV 1/2 Ag/Ab (4th Gen); Future    Other orders  -     pantoprazole (PROTONIX) 40 MG tablet; Take 1 tablet (40 mg total) by mouth before breakfast.           Ty Whatley MD  Internal Medicine-[facility]        Side effects of medication(s) were discussed in detail and patient voiced understanding.  Patient will call back for any issues or complications.

## 2020-01-24 LAB
CHOLEST SERPL-MCNC: 203 MG/DL (ref 120–199)
CHOLEST/HDLC SERPL: 5.6 {RATIO} (ref 2–5)
ESTIMATED AVG GLUCOSE: 114 MG/DL (ref 68–131)
HBA1C MFR BLD HPLC: 5.6 % (ref 4–5.6)
HDLC SERPL-MCNC: 36 MG/DL (ref 40–75)
HDLC SERPL: 17.7 % (ref 20–50)
HIV 1+2 AB+HIV1 P24 AG SERPL QL IA: NEGATIVE
LDLC SERPL CALC-MCNC: 91.2 MG/DL (ref 63–159)
NONHDLC SERPL-MCNC: 167 MG/DL
TRIGL SERPL-MCNC: 379 MG/DL (ref 30–150)

## 2020-06-29 ENCOUNTER — PATIENT OUTREACH (OUTPATIENT)
Dept: ADMINISTRATIVE | Facility: OTHER | Age: 55
End: 2020-06-29

## 2020-06-29 ENCOUNTER — TELEPHONE (OUTPATIENT)
Dept: INTERNAL MEDICINE | Facility: CLINIC | Age: 55
End: 2020-06-29

## 2020-06-29 DIAGNOSIS — M25.562 ACUTE PAIN OF LEFT KNEE: Primary | ICD-10-CM

## 2020-06-30 ENCOUNTER — HOSPITAL ENCOUNTER (OUTPATIENT)
Dept: RADIOLOGY | Facility: HOSPITAL | Age: 55
Discharge: HOME | End: 2020-06-30
Attending: PHYSICIAN ASSISTANT
Payer: MEDICARE

## 2020-06-30 ENCOUNTER — OFFICE VISIT (OUTPATIENT)
Dept: ORTHOPEDICS | Facility: CLINIC | Age: 55
End: 2020-06-30
Payer: MEDICARE

## 2020-06-30 VITALS
HEART RATE: 78 BPM | SYSTOLIC BLOOD PRESSURE: 143 MMHG | BODY MASS INDEX: 35.45 KG/M2 | TEMPERATURE: 99 F | DIASTOLIC BLOOD PRESSURE: 93 MMHG | WEIGHT: 240.06 LBS

## 2020-06-30 DIAGNOSIS — M25.562 ACUTE PAIN OF LEFT KNEE: Primary | ICD-10-CM

## 2020-06-30 DIAGNOSIS — M25.562 LEFT KNEE PAIN, UNSPECIFIED CHRONICITY: ICD-10-CM

## 2020-06-30 PROCEDURE — 99203 OFFICE O/P NEW LOW 30 MIN: CPT | Mod: HCNC,S$GLB,, | Performed by: PHYSICIAN ASSISTANT

## 2020-06-30 PROCEDURE — 3077F PR MOST RECENT SYSTOLIC BLOOD PRESSURE >= 140 MM HG: ICD-10-PCS | Mod: HCNC,CPTII,S$GLB, | Performed by: PHYSICIAN ASSISTANT

## 2020-06-30 PROCEDURE — 73564 X-RAY EXAM KNEE 4 OR MORE: CPT | Mod: 26,HCNC,LT, | Performed by: RADIOLOGY

## 2020-06-30 PROCEDURE — 3080F PR MOST RECENT DIASTOLIC BLOOD PRESSURE >= 90 MM HG: ICD-10-PCS | Mod: HCNC,CPTII,S$GLB, | Performed by: PHYSICIAN ASSISTANT

## 2020-06-30 PROCEDURE — 3080F DIAST BP >= 90 MM HG: CPT | Mod: HCNC,CPTII,S$GLB, | Performed by: PHYSICIAN ASSISTANT

## 2020-06-30 PROCEDURE — 3008F BODY MASS INDEX DOCD: CPT | Mod: HCNC,CPTII,S$GLB, | Performed by: PHYSICIAN ASSISTANT

## 2020-06-30 PROCEDURE — 73562 XR KNEE ORTHO LEFT WITH FLEXION: ICD-10-PCS | Mod: 26,59,HCNC,RT | Performed by: RADIOLOGY

## 2020-06-30 PROCEDURE — 99999 PR PBB SHADOW E&M-EST. PATIENT-LVL IV: ICD-10-PCS | Mod: PBBFAC,HCNC,, | Performed by: PHYSICIAN ASSISTANT

## 2020-06-30 PROCEDURE — 99203 PR OFFICE/OUTPT VISIT, NEW, LEVL III, 30-44 MIN: ICD-10-PCS | Mod: HCNC,S$GLB,, | Performed by: PHYSICIAN ASSISTANT

## 2020-06-30 PROCEDURE — 73562 X-RAY EXAM OF KNEE 3: CPT | Mod: TC,HCNC,RT

## 2020-06-30 PROCEDURE — 3008F PR BODY MASS INDEX (BMI) DOCUMENTED: ICD-10-PCS | Mod: HCNC,CPTII,S$GLB, | Performed by: PHYSICIAN ASSISTANT

## 2020-06-30 PROCEDURE — 73562 X-RAY EXAM OF KNEE 3: CPT | Mod: 26,59,HCNC,RT | Performed by: RADIOLOGY

## 2020-06-30 PROCEDURE — 3077F SYST BP >= 140 MM HG: CPT | Mod: HCNC,CPTII,S$GLB, | Performed by: PHYSICIAN ASSISTANT

## 2020-06-30 PROCEDURE — 73564 XR KNEE ORTHO LEFT WITH FLEXION: ICD-10-PCS | Mod: 26,HCNC,LT, | Performed by: RADIOLOGY

## 2020-06-30 PROCEDURE — 99999 PR PBB SHADOW E&M-EST. PATIENT-LVL IV: CPT | Mod: PBBFAC,HCNC,, | Performed by: PHYSICIAN ASSISTANT

## 2020-06-30 NOTE — PROGRESS NOTES
Subjective:      Patient ID: Stevenson Robison Jr. is a 55 y.o. male.    Chief Complaint: Swelling of the Left Knee    HPI  55 year old male presents with chief complaint of left knee pain x 6 days. He was changing a tire and had been kneeling down. He stood up with the tire and felt increased pain at the medial knee and he fell over. Pain is worse with standing. He is limping. He has taken pain medicine with no relief. He reports swelling, popping, and giving way. No locking or catching. He does not use assistive devices.   Review of Systems   Constitution: Negative for chills, fever and night sweats.   Cardiovascular: Negative for chest pain.   Respiratory: Negative for cough and shortness of breath.    Hematologic/Lymphatic: Does not bruise/bleed easily.   Skin: Negative for color change.   Gastrointestinal: Negative for heartburn.   Genitourinary: Negative for dysuria.   Neurological: Negative for numbness and paresthesias.   Psychiatric/Behavioral: Negative for altered mental status.   Allergic/Immunologic: Negative for persistent infections.         Objective:            Ortho/SPM Exam  General :   alert, appears stated age and cooperative   Gait: Antalgic. The patient can bear weight on the injured extremity.   Left Lower Extremity  Hip Palpation:  no tenderness over the greater  trochanter   Hip ROM: 100% of normal    Knee Effusion:  None.   Ecchymosis:  none   Knee ROM:  0 to 110 degrees without subpatellar   crepitance.   Patella:  Patella does track normally.  Patellar apprehension test: negative  Patellar compression test: positive   Tenderness: medial joint line   Stability:  Lachman's test: negative  Posterior drawer: negative  Medial collateral ligament: negative  Lateral collateral ligament: negative         Abril's Test:  positive    Sensation:   intact to light touch   Pulses: normal DP and PT pulses       X-ray: ordered and reviewed by myself. No fracture dislocation bone destruction seen.  There  is mild DJD.          Assessment:       Encounter Diagnoses   Name Primary?    Acute pain of left knee Yes    Left knee pain, unspecified chronicity           Plan:       MRI was ordered to r/o meniscus tear. RTC pending results.

## 2020-06-30 NOTE — LETTER
June 30, 2020      Ty Rodríguez MD  [address]  [address]  [facility] [address]           [facility] - Orthopedics  [address]  [facility] [address]  Phone: [phone]          Patient: Stevenson Robison Jr.   MR Number: [id number]   YOB: 1965   Date of Visit: 6/30/2020       Dear Dr. Ty Rodríguez:    Thank you for referring Stevenson Robison to me for evaluation. Attached you will find relevant portions of my assessment and plan of care.    If you have questions, please do not hesitate to call me. I look forward to following Stevenson Robison along with you.    Sincerely,    Corinne Holland PA-C    Enclosure  CC:  No Recipients    If you would like to receive this communication electronically, please contact [email or web address] or [phone] to request more information on [employer] Link access.    For providers and/or their staff who would like to refer a patient to [facility], please contact us through our one-stop-shop provider referral line, [facility], at [phone].    If you feel you have received this communication in error or would no longer like to receive these types of communications, please e-mail [email or web address]

## 2020-06-30 NOTE — PROGRESS NOTES
Requested updates within Care Everywhere.  Patient's chart was reviewed for overdue JAYLYN topics.  Immunizations reconciled.    Orders placed:n/a  Labs Linked:n/a

## 2020-07-06 ENCOUNTER — HOSPITAL ENCOUNTER (OUTPATIENT)
Dept: RADIOLOGY | Facility: HOSPITAL | Age: 55
Discharge: HOME | End: 2020-07-06
Attending: PHYSICIAN ASSISTANT
Payer: MEDICARE

## 2020-07-06 ENCOUNTER — TELEPHONE (OUTPATIENT)
Dept: ORTHOPEDICS | Facility: CLINIC | Age: 55
End: 2020-07-06

## 2020-07-06 DIAGNOSIS — M25.562 ACUTE PAIN OF LEFT KNEE: ICD-10-CM

## 2020-07-06 PROCEDURE — 73721 MRI JNT OF LWR EXTRE W/O DYE: CPT | Mod: TC,HCNC,LT

## 2020-07-06 PROCEDURE — 73721 MRI KNEE WITHOUT CONTRAST LEFT: ICD-10-PCS | Mod: 26,HCNC,LT, | Performed by: RADIOLOGY

## 2020-07-06 PROCEDURE — 73721 MRI JNT OF LWR EXTRE W/O DYE: CPT | Mod: 26,HCNC,LT, | Performed by: RADIOLOGY

## 2020-07-06 NOTE — TELEPHONE ENCOUNTER
Spoke to patient regarding MRI results and treatment options including injections, PT, and consult with a knee surgeon. He wants to discuss with his wife first and he will call us back to let us know.

## 2020-07-07 ENCOUNTER — OFFICE VISIT (OUTPATIENT)
Dept: ORTHOPEDICS | Facility: CLINIC | Age: 55
End: 2020-07-07
Payer: MEDICARE

## 2020-07-07 VITALS — BODY MASS INDEX: 34.84 KG/M2 | WEIGHT: 235.25 LBS | HEIGHT: 69 IN

## 2020-07-07 DIAGNOSIS — M17.12 PRIMARY OSTEOARTHRITIS OF LEFT KNEE: Primary | ICD-10-CM

## 2020-07-07 DIAGNOSIS — S83.242D TEAR OF MEDIAL MENISCUS OF LEFT KNEE, UNSPECIFIED TEAR TYPE, UNSPECIFIED WHETHER OLD OR CURRENT TEAR, SUBSEQUENT ENCOUNTER: ICD-10-CM

## 2020-07-07 PROCEDURE — 3008F PR BODY MASS INDEX (BMI) DOCUMENTED: ICD-10-PCS | Mod: HCNC,CPTII,S$GLB, | Performed by: PHYSICIAN ASSISTANT

## 2020-07-07 PROCEDURE — 20610 DRAIN/INJ JOINT/BURSA W/O US: CPT | Mod: HCNC,LT,S$GLB, | Performed by: PHYSICIAN ASSISTANT

## 2020-07-07 PROCEDURE — 99213 PR OFFICE/OUTPT VISIT, EST, LEVL III, 20-29 MIN: ICD-10-PCS | Mod: 25,HCNC,S$GLB, | Performed by: PHYSICIAN ASSISTANT

## 2020-07-07 PROCEDURE — 20610 PR DRAIN/INJECT LARGE JOINT/BURSA: ICD-10-PCS | Mod: HCNC,LT,S$GLB, | Performed by: PHYSICIAN ASSISTANT

## 2020-07-07 PROCEDURE — 3008F BODY MASS INDEX DOCD: CPT | Mod: HCNC,CPTII,S$GLB, | Performed by: PHYSICIAN ASSISTANT

## 2020-07-07 PROCEDURE — 99213 OFFICE O/P EST LOW 20 MIN: CPT | Mod: 25,HCNC,S$GLB, | Performed by: PHYSICIAN ASSISTANT

## 2020-07-07 PROCEDURE — 99999 PR PBB SHADOW E&M-EST. PATIENT-LVL III: ICD-10-PCS | Mod: PBBFAC,HCNC,, | Performed by: PHYSICIAN ASSISTANT

## 2020-07-07 PROCEDURE — 99999 PR PBB SHADOW E&M-EST. PATIENT-LVL III: CPT | Mod: PBBFAC,HCNC,, | Performed by: PHYSICIAN ASSISTANT

## 2020-07-07 RX ORDER — METHYLPREDNISOLONE ACETATE 80 MG/ML
80 INJECTION, SUSPENSION INTRA-ARTICULAR; INTRALESIONAL; INTRAMUSCULAR; SOFT TISSUE
Status: COMPLETED | OUTPATIENT
Start: 2020-07-07 | End: 2020-07-07

## 2020-07-07 RX ORDER — NAPROXEN 500 MG/1
500 TABLET ORAL 2 TIMES DAILY WITH MEALS
Qty: 28 TABLET | Refills: 0 | Status: SHIPPED | OUTPATIENT
Start: 2020-07-07 | End: 2020-08-06

## 2020-07-07 RX ADMIN — METHYLPREDNISOLONE ACETATE 80 MG: 80 INJECTION, SUSPENSION INTRA-ARTICULAR; INTRALESIONAL; INTRAMUSCULAR; SOFT TISSUE at 06:07

## 2020-07-07 NOTE — PROGRESS NOTES
Subjective:      Patient ID: Stevenson Robison Jr. is a 55 y.o. male.    Chief Complaint: Pain of the Left Knee    HPI  Patient returns regarding his left knee pain. MRI was ordered which showed medial meniscus tear and OA changes. He is trying to avoid knee surgery at this time. Pain is medial. He reports popping and catching when he is going up steps. He denies locking and giving way. He is here to try conservative tx for his knee.   Review of Systems   Constitution: Negative for chills, fever and night sweats.   Cardiovascular: Negative for chest pain.   Respiratory: Negative for cough and shortness of breath.    Hematologic/Lymphatic: Does not bruise/bleed easily.   Skin: Negative for color change.   Gastrointestinal: Negative for heartburn.   Genitourinary: Negative for dysuria.   Neurological: Negative for numbness and paresthesias.   Psychiatric/Behavioral: Negative for altered mental status.   Allergic/Immunologic: Negative for persistent infections.         Objective:            General    Vitals reviewed.  Constitutional: He is oriented to person, place, and time. He appears well-developed and well-nourished.   Cardiovascular: Normal rate.    Neurological: He is alert and oriented to person, place, and time.             Left Knee Exam:  ROM 0-120 degrees  No effusion  No warmth or erythema  TTP medial joint line      X-ray: reviewed by myself. Mild DJD.    MRI: Horizontal tear of the medial meniscus.   Edema surrounding the MCL and bone marrow edema in the medial femoral condyle, likely reactive in nature.   Chondral fissuring over the trochlea and medial femoral condyle.   Mild quadriceps tendinosis      Assessment:       Encounter Diagnoses   Name Primary?    Primary osteoarthritis of left knee Yes    Tear of medial meniscus of left knee, unspecified tear type, unspecified whether old or current tear, subsequent encounter           Plan:       Discussed treatment options with patient. He wants to try  conservative tx at this time. He would like an injection. Prescription for knee brace done. Order placed for PT. Naproxen sent to pharmacy. If pain does not improve, will refer him to sports medicine surgeon. RTC prn.     PROCEDURE:  I have explained the risks, benefits, and alternatives of the procedure in detail.  The patient voices understanding and all questions have been answered.  The patient agrees to proceed as planned. So after I performed a sterile prep of the skin in the normal fashion the left knee is injected using a 22 gauge needle from the anteromedial approach with a combination of 4cc 1% plain lidocaine and 80 mg of depo medrol.  The patient is cautioned and immediate relief of pain is secondary to the local anesthetic and will be temporary.  After the anesthetic wears off there may be a increase in pain that may last for a few hours or a few days and they should use ice to help alleviate this flair up of pain.

## 2020-07-07 NOTE — TELEPHONE ENCOUNTER
Spoke to patient regarding MRI results. Discussed tx options. He wants to avoid surgery at this time. He would like to try an injection and knee brace. He will f/u in clinic tomorrow for this.

## 2020-07-20 ENCOUNTER — TELEPHONE (OUTPATIENT)
Dept: ORTHOPEDICS | Facility: CLINIC | Age: 55
End: 2020-07-20

## 2020-07-20 NOTE — TELEPHONE ENCOUNTER
Spoke with patients significant other and explained that someone from DME will be calling to give a date and time for the patient to come in to pick the brace up.Significant other verbalizes understanding.

## 2020-08-03 ENCOUNTER — CLINICAL SUPPORT (OUTPATIENT)
Dept: REHABILITATION | Facility: HOSPITAL | Age: 55
End: 2020-08-03
Payer: MEDICARE

## 2020-08-03 DIAGNOSIS — M25.662 DECREASED RANGE OF MOTION OF LEFT KNEE: ICD-10-CM

## 2020-08-03 DIAGNOSIS — M17.12 PRIMARY OSTEOARTHRITIS OF LEFT KNEE: ICD-10-CM

## 2020-08-03 DIAGNOSIS — M62.81 QUADRICEPS WEAKNESS: ICD-10-CM

## 2020-08-03 PROBLEM — M25.669 DECREASED RANGE OF MOTION (ROM) OF KNEE: Status: ACTIVE | Noted: 2020-08-03

## 2020-08-03 PROCEDURE — 97162 PT EVAL MOD COMPLEX 30 MIN: CPT | Mod: HCNC,PO

## 2020-08-04 NOTE — PLAN OF CARE
[facility]  Physical Therapy Initial Evaluation    Date: 8/3/2020   Name: Stevenson Robison Jr.  Clinic Number: [id number]    Therapy Diagnosis:   Encounter Diagnoses   Name Primary?    Primary osteoarthritis of left knee     Quadriceps weakness     Decreased range of motion of left knee      Physician: Corinne Holland PA-C    Physician Orders: PT Eval and Treat   Medical Diagnosis from Referral: M17.12 (ICD-10-CM) - Primary osteoarthritis of left knee  Evaluation Date: 8/3/2020  Authorization Period Expiration: 8/3/2020  Plan of Care Expiration: 11/3/2020  Visit # / Visits authorized: 1/ 1    Time In: 305pm (pt was late)  Time Out: 345pm  Total Appointment Time (timed & untimed codes): 40 minutes (eval only)    Precautions: Standard    Subjective   Date of onset: month and a half ago   History of current condition - Pablo reports: that he had a flat tire on his truck and fell over when changing the tire. He has had a brace for about a week and it has been helping but when not wearing the brace he feels unstable. Stairs are an issue. He states that he is uncomfortable with coming to physical therapy with the amount of people in the clinic. Pt was educated on the precautions that the clinic was taking.      Medical History:   Past Medical History:   Diagnosis Date    Asthma     Hypertension        Surgical History:   Stevenson Robison Jr.  has a past surgical history that includes Hernia repair (2008); decompression neck (2005); Nasal septum surgery (2005); Back surgery; and Colonoscopy (N/A, 2/23/2017).    Medications:   Stevenson has a current medication list which includes the following prescription(s): albuterol, aspirin, hydrochlorothiazide, hydrocodone-acetaminophen, ibuprofen, naproxen, pantoprazole, promethazine-dextromethorphan, trazodone, and triamcinolone acetonide 0.1%.    Allergies:   Review of patient's allergies indicates:   Allergen Reactions    Other omega-3s Hives     Pt  "reports allergic to either an anti-inflammatory or antibiotic. PT unsure of name        Imaging, MRI studies, CT scan films: "Horizontal tear of the medial meniscus.  Edema surrounding the MCL and bone marrow edema in the medial femoral condyle, likely reactive in nature.  Chondral fissuring over the trochlea and medial femoral condyle.  Mild quadriceps tendinosis."    "Right: No fracture dislocation bone destruction seen.  There is mild DJD."        Prior Therapy: yes, for the back  Social History: going up the stairs is painful, coming down is not as bad; has 5 steps at home with a railing, lives with significant other   Occupation: works as an  of roofs  Prior Level of Function: independent   Current Level of Function: independent, not working as much     Pain:  Current 5/10, worst 8/10, best 3/10   Location: left knee   Description: stabbing pain in the inside when moving certain ways  Aggravating Factors: changing directions, twisting certain ways  Easing Factors: brace    Patients goals: to get it stronger and avoid surgery    Objective     Range of Motion:    0-120 on left   0-125 on right       Lower Extremity Strength  Right LE  Left LE    Knee extension: 5/5 Knee extension: 5/5   Knee flexion: 5/5 Knee flexion: 4+/5   Hip flexion: 4+/5 Hip flexion: 4+/5   Hip extension:  4/5 Hip extension: 4/5   Hip abduction: 4/5 Hip abduction: 4/5   Hip adduction: 4+/5 Hip adduction 4+/5   Ankle dorsiflexion: 5/5 Ankle dorsiflexion: 5/5   Ankle plantarflexion: 5/5 Ankle plantarflexion: 5/5           Function:    - Sit <--> Stand:independent but with difficulty, increased weight on right    - Bed Mobility: independent     Joint Mobility: normal  Patellar    Palpation: tenderness at medial joint line     Sensation: intact     Flexibility: moderate tightness bilateral hamstrings, hip flexors     Edema: none noted         Limitation/Restriction for FOTO Knee Survey    Therapist reviewed FOTO scores for Stevenson MCCARTHY" Enriqueta Valencia on 8/3/2020.   FOTO documents entered into [employer] - see Media section.  Based on clinical assessment:   Limitation Score: 50%         Home Exercises and Patient Education Provided    Education provided:   - HEP    Written Home Exercises Provided: yes.  Exercises were reviewed and Pablo was able to demonstrate them prior to the end of the session.  Pablo demonstrated good  understanding of the education provided.     See EMR under Patient Instructions for exercises provided 8/3/2020.    Assessment   Stevenson is a 55 y.o. male referred to outpatient Physical Therapy with a medical diagnosis of primary osteoarthritis of L knee. Patient presents with antalgic gait, donning knee brace with lateral/medial support, allowing flexion extension only, tenderness medial joint line, decreased functional quad strength, hip weakness, decreased left knee range of motion compared to opposite side.     Patient prognosis is Fair.   Patientt will benefit from skilled outpatient Physical Therapy to address the deficits stated above and in the chart below, provide patient /family education, and to maximize patientt's level of independence.     Plan of care discussed with patient: Yes  Patient's spiritual, cultural and educational needs considered and patient is agreeable to the plan of care and goals as stated below:     Anticipated Barriers for therapy: extent of meniscal tear, chronicity of condition    Medical Necessity is demonstrated by the following  History  Co-morbidities and personal factors that may impact the plan of care Co-morbidities:   hypertension, past lumbar surgery    Personal Factors:   lifestyle     moderate   Examination  Body Structures and Functions, activity limitations and participation restrictions that may impact the plan of care Body Regions:   lower extremities    Body Systems:    gross symmetry  ROM  strength  gross coordinated movement  balance  gait    Participation Restrictions:   none    Activity  limitations:   Learning and applying knowledge  no deficits    General Tasks and Commands  no deficits    Communication  no deficits    Mobility  lifting and carrying objects  walking    Self care  no deficits    Domestic Life  doing house work (cleaning house, washing dishes, laundry)    Interactions/Relationships  no deficits    Life Areas  no deficits    Community and Social Life  no deficits         moderate   Clinical Presentation evolving clinical presentation with changing clinical characteristics moderate   Decision Making/ Complexity Score: moderate     Short Term Goals (6 Weeks):   1. Pt will be compliant with HEP to supplement PT in restoring pain free function.  2. Pt will improve impaired LE MMTs by 1/2 grade  to improve strength for functional tasks  3. Pt improve impaired knee AROM by 5 deg of flexion to improve mobility for normal movement patterns.   Long Term Goals (12 Weeks):  1. Pt will improve impaired LE MMTs by 1 grade to improve strength for functional tasks.  2. Pt improve functional quad strength by performing 10+ step ups and downs without pain and with good form  3. Pt will be able to perform mini squats x30 without pain and with proper form to show improvements in functional strength    Plan   Plan of care Certification: 8/3/2020 to 11/3/2020.    Outpatient Physical Therapy 1 follow up for reassessment due to patient's COVID concerns and desire to not perform formal PT at this time. The one follow up will  include the following interventions: Gait Training, Manual Therapy, Moist Heat/ Ice, Neuromuscular Re-ed, Patient Education, Therapeutic Activites and Therapeutic Exercise.     Rupert Lombardo, PT

## 2020-08-21 ENCOUNTER — PES CALL (OUTPATIENT)
Dept: ADMINISTRATIVE | Facility: CLINIC | Age: 55
End: 2020-08-21

## 2020-11-16 ENCOUNTER — PATIENT OUTREACH (OUTPATIENT)
Dept: ADMINISTRATIVE | Facility: HOSPITAL | Age: 55
End: 2020-11-16

## 2020-11-16 ENCOUNTER — PATIENT MESSAGE (OUTPATIENT)
Dept: ADMINISTRATIVE | Facility: HOSPITAL | Age: 55
End: 2020-11-16

## 2020-12-24 ENCOUNTER — TELEPHONE (OUTPATIENT)
Dept: INTERNAL MEDICINE | Facility: CLINIC | Age: 55
End: 2020-12-24

## 2020-12-24 ENCOUNTER — OFFICE VISIT (OUTPATIENT)
Dept: INTERNAL MEDICINE | Facility: CLINIC | Age: 55
End: 2020-12-24
Payer: MEDICARE

## 2020-12-24 VITALS
HEIGHT: 69 IN | SYSTOLIC BLOOD PRESSURE: 136 MMHG | DIASTOLIC BLOOD PRESSURE: 80 MMHG | BODY MASS INDEX: 34.48 KG/M2 | HEART RATE: 84 BPM | WEIGHT: 232.81 LBS | OXYGEN SATURATION: 96 %

## 2020-12-24 DIAGNOSIS — B02.9 HERPES ZOSTER WITHOUT COMPLICATION: Primary | ICD-10-CM

## 2020-12-24 PROCEDURE — 3079F PR MOST RECENT DIASTOLIC BLOOD PRESSURE 80-89 MM HG: ICD-10-PCS | Mod: HCNC,CPTII,S$GLB, | Performed by: INTERNAL MEDICINE

## 2020-12-24 PROCEDURE — 1126F AMNT PAIN NOTED NONE PRSNT: CPT | Mod: HCNC,S$GLB,, | Performed by: INTERNAL MEDICINE

## 2020-12-24 PROCEDURE — 3075F PR MOST RECENT SYSTOLIC BLOOD PRESS GE 130-139MM HG: ICD-10-PCS | Mod: HCNC,CPTII,S$GLB, | Performed by: INTERNAL MEDICINE

## 2020-12-24 PROCEDURE — 99999 PR PBB SHADOW E&M-EST. PATIENT-LVL III: CPT | Mod: PBBFAC,HCNC,, | Performed by: INTERNAL MEDICINE

## 2020-12-24 PROCEDURE — 3008F PR BODY MASS INDEX (BMI) DOCUMENTED: ICD-10-PCS | Mod: HCNC,CPTII,S$GLB, | Performed by: INTERNAL MEDICINE

## 2020-12-24 PROCEDURE — 99213 OFFICE O/P EST LOW 20 MIN: CPT | Mod: HCNC,S$GLB,, | Performed by: INTERNAL MEDICINE

## 2020-12-24 PROCEDURE — 99213 PR OFFICE/OUTPT VISIT, EST, LEVL III, 20-29 MIN: ICD-10-PCS | Mod: HCNC,S$GLB,, | Performed by: INTERNAL MEDICINE

## 2020-12-24 PROCEDURE — 3075F SYST BP GE 130 - 139MM HG: CPT | Mod: HCNC,CPTII,S$GLB, | Performed by: INTERNAL MEDICINE

## 2020-12-24 PROCEDURE — 3079F DIAST BP 80-89 MM HG: CPT | Mod: HCNC,CPTII,S$GLB, | Performed by: INTERNAL MEDICINE

## 2020-12-24 PROCEDURE — 99999 PR PBB SHADOW E&M-EST. PATIENT-LVL III: ICD-10-PCS | Mod: PBBFAC,HCNC,, | Performed by: INTERNAL MEDICINE

## 2020-12-24 PROCEDURE — 1126F PR PAIN SEVERITY QUANTIFIED, NO PAIN PRESENT: ICD-10-PCS | Mod: HCNC,S$GLB,, | Performed by: INTERNAL MEDICINE

## 2020-12-24 PROCEDURE — 3008F BODY MASS INDEX DOCD: CPT | Mod: HCNC,CPTII,S$GLB, | Performed by: INTERNAL MEDICINE

## 2020-12-24 RX ORDER — GABAPENTIN 300 MG/1
300 CAPSULE ORAL NIGHTLY
Qty: 30 CAPSULE | Refills: 11 | Status: SHIPPED | OUTPATIENT
Start: 2020-12-24 | End: 2023-03-17

## 2020-12-24 NOTE — TELEPHONE ENCOUNTER
----- Message from Ann Qureshi sent at 12/23/2020  3:27 PM CST -----  Contact: Shellie Jackson (Friend)  Type:  Same Day Appointment Request    Caller is requesting a same day appointment.  Caller declined first available appointment listed below.      Name of Caller:Shellie Jackson (Friend)    When is the first available appointment?1/25    Symptoms:Patient thinks he's breaking out in shingles    Best Call Back Number:[phone]    Pharmacy:   [facility]/pharmacy #[id number] - [place], [place] - [address]  [address]  [place] [address]  Phone: [phone] Fax: [phone]        Additional Information:

## 2021-07-01 ENCOUNTER — PES CALL (OUTPATIENT)
Dept: ADMINISTRATIVE | Facility: CLINIC | Age: 56
End: 2021-07-01

## 2021-11-17 ENCOUNTER — TELEPHONE (OUTPATIENT)
Dept: INTERNAL MEDICINE | Facility: CLINIC | Age: 56
End: 2021-11-17
Payer: MEDICARE

## 2021-11-17 DIAGNOSIS — E78.5 DYSLIPIDEMIA: Primary | ICD-10-CM

## 2021-11-17 DIAGNOSIS — I10 BENIGN ESSENTIAL HYPERTENSION: ICD-10-CM

## 2021-11-17 DIAGNOSIS — R79.9 ABNORMAL FINDING OF BLOOD CHEMISTRY, UNSPECIFIED: ICD-10-CM

## 2021-11-17 DIAGNOSIS — E66.01 SEVERE OBESITY (BMI 35.0-39.9) WITH COMORBIDITY: ICD-10-CM

## 2021-11-23 ENCOUNTER — LAB VISIT (OUTPATIENT)
Dept: LAB | Facility: OTHER | Age: 56
End: 2021-11-23
Attending: INTERNAL MEDICINE
Payer: MEDICARE

## 2021-11-23 DIAGNOSIS — E78.5 DYSLIPIDEMIA: ICD-10-CM

## 2021-11-23 DIAGNOSIS — I10 BENIGN ESSENTIAL HYPERTENSION: ICD-10-CM

## 2021-11-23 DIAGNOSIS — R79.9 ABNORMAL FINDING OF BLOOD CHEMISTRY, UNSPECIFIED: ICD-10-CM

## 2021-11-23 LAB
ALBUMIN SERPL BCP-MCNC: 4.3 G/DL (ref 3.5–5.2)
ALP SERPL-CCNC: 64 U/L (ref 55–135)
ALT SERPL W/O P-5'-P-CCNC: 13 U/L (ref 10–44)
ANION GAP SERPL CALC-SCNC: 11 MMOL/L (ref 8–16)
AST SERPL-CCNC: 18 U/L (ref 10–40)
BASOPHILS # BLD AUTO: 0.05 K/UL (ref 0–0.2)
BASOPHILS NFR BLD: 0.7 % (ref 0–1.9)
BILIRUB SERPL-MCNC: 0.7 MG/DL (ref 0.1–1)
BUN SERPL-MCNC: 17 MG/DL (ref 6–20)
CALCIUM SERPL-MCNC: 9.5 MG/DL (ref 8.7–10.5)
CHLORIDE SERPL-SCNC: 102 MMOL/L (ref 95–110)
CHOLEST SERPL-MCNC: 209 MG/DL (ref 120–199)
CHOLEST/HDLC SERPL: 4.9 {RATIO} (ref 2–5)
CO2 SERPL-SCNC: 28 MMOL/L (ref 23–29)
CREAT SERPL-MCNC: 1.1 MG/DL (ref 0.5–1.4)
DIFFERENTIAL METHOD: NORMAL
EOSINOPHIL # BLD AUTO: 0.1 K/UL (ref 0–0.5)
EOSINOPHIL NFR BLD: 1.5 % (ref 0–8)
ERYTHROCYTE [DISTWIDTH] IN BLOOD BY AUTOMATED COUNT: 12.1 % (ref 11.5–14.5)
EST. GFR  (AFRICAN AMERICAN): >60 ML/MIN/1.73 M^2
EST. GFR  (NON AFRICAN AMERICAN): >60 ML/MIN/1.73 M^2
ESTIMATED AVG GLUCOSE: 111 MG/DL (ref 68–131)
GLUCOSE SERPL-MCNC: 111 MG/DL (ref 70–110)
HBA1C MFR BLD: 5.5 % (ref 4–5.6)
HCT VFR BLD AUTO: 50.1 % (ref 40–54)
HDLC SERPL-MCNC: 43 MG/DL (ref 40–75)
HDLC SERPL: 20.6 % (ref 20–50)
HGB BLD-MCNC: 16.7 G/DL (ref 14–18)
IMM GRANULOCYTES # BLD AUTO: 0.02 K/UL (ref 0–0.04)
IMM GRANULOCYTES NFR BLD AUTO: 0.3 % (ref 0–0.5)
LDLC SERPL CALC-MCNC: 148 MG/DL (ref 63–159)
LYMPHOCYTES # BLD AUTO: 2.1 K/UL (ref 1–4.8)
LYMPHOCYTES NFR BLD: 30.6 % (ref 18–48)
MCH RBC QN AUTO: 30.9 PG (ref 27–31)
MCHC RBC AUTO-ENTMCNC: 33.3 G/DL (ref 32–36)
MCV RBC AUTO: 93 FL (ref 82–98)
MONOCYTES # BLD AUTO: 0.8 K/UL (ref 0.3–1)
MONOCYTES NFR BLD: 11.5 % (ref 4–15)
NEUTROPHILS # BLD AUTO: 3.8 K/UL (ref 1.8–7.7)
NEUTROPHILS NFR BLD: 55.4 % (ref 38–73)
NONHDLC SERPL-MCNC: 166 MG/DL
NRBC BLD-RTO: 0 /100 WBC
PLATELET # BLD AUTO: 281 K/UL (ref 150–450)
PMV BLD AUTO: 10 FL (ref 9.2–12.9)
POTASSIUM SERPL-SCNC: 4.2 MMOL/L (ref 3.5–5.1)
PROT SERPL-MCNC: 7.4 G/DL (ref 6–8.4)
RBC # BLD AUTO: 5.41 M/UL (ref 4.6–6.2)
SODIUM SERPL-SCNC: 141 MMOL/L (ref 136–145)
TRIGL SERPL-MCNC: 90 MG/DL (ref 30–150)
TSH SERPL DL<=0.005 MIU/L-ACNC: 0.9 UIU/ML (ref 0.4–4)
WBC # BLD AUTO: 6.84 K/UL (ref 3.9–12.7)

## 2021-11-23 PROCEDURE — 80061 LIPID PANEL: CPT | Mod: HCNC | Performed by: INTERNAL MEDICINE

## 2021-11-23 PROCEDURE — 36415 COLL VENOUS BLD VENIPUNCTURE: CPT | Mod: HCNC | Performed by: INTERNAL MEDICINE

## 2021-11-23 PROCEDURE — 83036 HEMOGLOBIN GLYCOSYLATED A1C: CPT | Mod: HCNC | Performed by: INTERNAL MEDICINE

## 2021-11-23 PROCEDURE — 85025 COMPLETE CBC W/AUTO DIFF WBC: CPT | Mod: HCNC | Performed by: INTERNAL MEDICINE

## 2021-11-23 PROCEDURE — 80053 COMPREHEN METABOLIC PANEL: CPT | Mod: HCNC | Performed by: INTERNAL MEDICINE

## 2021-11-23 PROCEDURE — 84443 ASSAY THYROID STIM HORMONE: CPT | Mod: HCNC | Performed by: INTERNAL MEDICINE

## 2021-11-30 ENCOUNTER — OFFICE VISIT (OUTPATIENT)
Dept: INTERNAL MEDICINE | Facility: CLINIC | Age: 56
End: 2021-11-30
Attending: INTERNAL MEDICINE
Payer: MEDICARE

## 2021-11-30 VITALS
HEIGHT: 69 IN | HEART RATE: 92 BPM | OXYGEN SATURATION: 97 % | BODY MASS INDEX: 33.73 KG/M2 | SYSTOLIC BLOOD PRESSURE: 128 MMHG | WEIGHT: 227.75 LBS | DIASTOLIC BLOOD PRESSURE: 88 MMHG

## 2021-11-30 DIAGNOSIS — Z00.00 ANNUAL PHYSICAL EXAM: Primary | ICD-10-CM

## 2021-11-30 DIAGNOSIS — I10 BENIGN ESSENTIAL HYPERTENSION: ICD-10-CM

## 2021-11-30 DIAGNOSIS — H91.93 BILATERAL HEARING LOSS, UNSPECIFIED HEARING LOSS TYPE: ICD-10-CM

## 2021-11-30 DIAGNOSIS — H53.8 BLURRY VISION, BILATERAL: ICD-10-CM

## 2021-11-30 DIAGNOSIS — E66.01 SEVERE OBESITY (BMI 35.0-39.9) WITH COMORBIDITY: ICD-10-CM

## 2021-11-30 PROCEDURE — 99396 PREV VISIT EST AGE 40-64: CPT | Mod: HCNC,S$GLB,, | Performed by: INTERNAL MEDICINE

## 2021-11-30 PROCEDURE — 99999 PR PBB SHADOW E&M-EST. PATIENT-LVL IV: ICD-10-PCS | Mod: PBBFAC,HCNC,, | Performed by: INTERNAL MEDICINE

## 2021-11-30 PROCEDURE — 99396 PR PREVENTIVE VISIT,EST,40-64: ICD-10-PCS | Mod: HCNC,S$GLB,, | Performed by: INTERNAL MEDICINE

## 2021-11-30 PROCEDURE — 99999 PR PBB SHADOW E&M-EST. PATIENT-LVL IV: CPT | Mod: PBBFAC,HCNC,, | Performed by: INTERNAL MEDICINE

## 2021-11-30 RX ORDER — ALBUTEROL SULFATE 90 UG/1
2 AEROSOL, METERED RESPIRATORY (INHALATION) EVERY 6 HOURS PRN
Status: CANCELLED | OUTPATIENT
Start: 2021-11-30

## 2021-12-08 DIAGNOSIS — J45.30 MILD PERSISTENT ASTHMA WITHOUT COMPLICATION: Primary | ICD-10-CM

## 2021-12-09 ENCOUNTER — CLINICAL SUPPORT (OUTPATIENT)
Dept: AUDIOLOGY | Facility: CLINIC | Age: 56
End: 2021-12-09
Payer: MEDICARE

## 2021-12-09 DIAGNOSIS — H93.13 TINNITUS OF BOTH EARS: ICD-10-CM

## 2021-12-09 DIAGNOSIS — H91.93 BILATERAL HEARING LOSS, UNSPECIFIED HEARING LOSS TYPE: ICD-10-CM

## 2021-12-09 DIAGNOSIS — H90.3 SENSORINEURAL HEARING LOSS, BILATERAL: Primary | ICD-10-CM

## 2021-12-09 PROCEDURE — 92567 PR TYMPA2METRY: ICD-10-PCS | Mod: HCNC,S$GLB,, | Performed by: AUDIOLOGIST

## 2021-12-09 PROCEDURE — 92567 TYMPANOMETRY: CPT | Mod: HCNC,S$GLB,, | Performed by: AUDIOLOGIST

## 2021-12-09 PROCEDURE — 99999 PR PBB SHADOW E&M-EST. PATIENT-LVL I: ICD-10-PCS | Mod: PBBFAC,HCNC,, | Performed by: AUDIOLOGIST

## 2021-12-09 PROCEDURE — 92557 PR COMPREHENSIVE HEARING TEST: ICD-10-PCS | Mod: HCNC,S$GLB,, | Performed by: AUDIOLOGIST

## 2021-12-09 PROCEDURE — 92557 COMPREHENSIVE HEARING TEST: CPT | Mod: HCNC,S$GLB,, | Performed by: AUDIOLOGIST

## 2021-12-09 PROCEDURE — 99999 PR PBB SHADOW E&M-EST. PATIENT-LVL I: CPT | Mod: PBBFAC,HCNC,, | Performed by: AUDIOLOGIST

## 2021-12-09 RX ORDER — ALBUTEROL SULFATE 90 UG/1
2 AEROSOL, METERED RESPIRATORY (INHALATION) EVERY 6 HOURS PRN
Qty: 8 G | Refills: 3 | Status: SHIPPED | OUTPATIENT
Start: 2021-12-09 | End: 2023-06-19 | Stop reason: SDUPTHER

## 2022-03-05 NOTE — TELEPHONE ENCOUNTER
No new care gaps identified.  Powered by [employer]. Reference number: [id number].   3/05/2022 7:37:57 AM CST

## 2022-03-09 RX ORDER — HYDROCHLOROTHIAZIDE 25 MG/1
TABLET ORAL
Qty: 90 TABLET | Refills: 2 | Status: SHIPPED | OUTPATIENT
Start: 2022-03-09 | End: 2022-12-16 | Stop reason: SDUPTHER

## 2022-03-09 NOTE — TELEPHONE ENCOUNTER
Refill Routing Note   Medication(s) are not appropriate for processing by [facility] for the following reason(s):      - Drug-Drug Interaction (HCTZ 12.5mg and HCTZ 25mg)    [facility] action(s):  Defer Medication-related problems identified: Drug-drug interaction     Medication Therapy Plan: Drug drug interaction: hctz 12.5 and hctz 25mg; Defer to PCP  --->Care Gap information included in message below if applicable.   Medication reconciliation completed: No   Automatic Epic Generated Protocol Data:        Requested Prescriptions   Pending Prescriptions Disp Refills    hydroCHLOROthiazide (HYDRODIURIL) 25 MG tablet [Pharmacy Med Name: HYDROCHLOROTHIAZIDE 25 MG TAB] 90 tablet 2     Sig: TAKE 1 TABLET BY MOUTH EVERY DAY       Cardiovascular: Diuretics - Thiazide Passed - 3/5/2022  7:37 AM        Passed - Patient is at least 18 years old        Passed - Last BP in normal range within 360 days     BP Readings from Last 1 Encounters:   11/30/21 128/88               Passed - Valid encounter within last 15 months     Recent Visits  Date Type Provider Dept   11/30/21 Office Visit Ty Rodríguez MD [facility] Internal Medicine   Showing recent visits within past 720 days and meeting all other requirements  Future Appointments  No visits were found meeting these conditions.  Showing future appointments within next 150 days and meeting all other requirements      Future Appointments              In 1 week [facility]                Passed - Cr is 1.39 or below and within 360 days     Lab Results   Component Value Date    CREATININE 1.1 11/23/2021    CREATININE 0.9 01/23/2020    CREATININE 0.9 10/18/2018              Passed - K in normal range and within 360 days     Potassium   Date Value Ref Range Status   11/23/2021 4.2 3.5 - 5.1 mmol/L Final   01/23/2020 3.5 3.5 - 5.1 mmol/L Final   10/18/2018 4.1 3.5 - 5.1 mmol/L Final              Passed - Na is between 130  and 148 and within 360 days     Sodium   Date Value Ref Range Status   11/23/2021 141 136 - 145 mmol/L Final   01/23/2020 139 136 - 145 mmol/L Final   10/18/2018 139 136 - 145 mmol/L Final              Passed - eGFR within 360 days     Lab Results   Component Value Date    EGFRNONAA >60 11/23/2021    EGFRNONAA >60 01/23/2020    EGFRNONAA >60 10/18/2018                      Appointments  past 12m or future 3m with PCP    Date Provider   Last Visit   11/30/2021 Ty Rodríguez MD   Next Visit   Visit date not found Ty Rodríguez MD   ED visits in past 90 days: 0        Note composed:4:27 PM 03/09/2022

## 2022-03-17 ENCOUNTER — OFFICE VISIT (OUTPATIENT)
Dept: OPTOMETRY | Facility: CLINIC | Age: 57
End: 2022-03-17
Payer: MEDICARE

## 2022-03-17 ENCOUNTER — TELEPHONE (OUTPATIENT)
Dept: OPTOMETRY | Facility: CLINIC | Age: 57
End: 2022-03-17

## 2022-03-17 DIAGNOSIS — H52.4 HYPEROPIA WITH ASTIGMATISM AND PRESBYOPIA, BILATERAL: ICD-10-CM

## 2022-03-17 DIAGNOSIS — H53.8 BLURRY VISION, BILATERAL: ICD-10-CM

## 2022-03-17 DIAGNOSIS — H52.203 HYPEROPIA WITH ASTIGMATISM AND PRESBYOPIA, BILATERAL: ICD-10-CM

## 2022-03-17 DIAGNOSIS — H51.11 CI (CONVERGENCE INSUFFICIENCY): ICD-10-CM

## 2022-03-17 DIAGNOSIS — H25.813 COMBINED FORM OF AGE-RELATED CATARACT, BOTH EYES: Primary | ICD-10-CM

## 2022-03-17 DIAGNOSIS — H52.03 HYPEROPIA WITH ASTIGMATISM AND PRESBYOPIA, BILATERAL: ICD-10-CM

## 2022-03-17 PROCEDURE — 92004 PR EYE EXAM, NEW PATIENT,COMPREHESV: ICD-10-PCS | Mod: S$GLB,,, | Performed by: OPTOMETRIST

## 2022-03-17 PROCEDURE — 99999 PR PBB SHADOW E&M-EST. PATIENT-LVL III: CPT | Mod: PBBFAC,,, | Performed by: OPTOMETRIST

## 2022-03-17 PROCEDURE — 1159F MED LIST DOCD IN RCRD: CPT | Mod: CPTII,S$GLB,, | Performed by: OPTOMETRIST

## 2022-03-17 PROCEDURE — 92015 PR REFRACTION: ICD-10-PCS | Mod: S$GLB,,, | Performed by: OPTOMETRIST

## 2022-03-17 PROCEDURE — 92004 COMPRE OPH EXAM NEW PT 1/>: CPT | Mod: S$GLB,,, | Performed by: OPTOMETRIST

## 2022-03-17 PROCEDURE — 99999 PR PBB SHADOW E&M-EST. PATIENT-LVL III: ICD-10-PCS | Mod: PBBFAC,,, | Performed by: OPTOMETRIST

## 2022-03-17 PROCEDURE — 92015 DETERMINE REFRACTIVE STATE: CPT | Mod: S$GLB,,, | Performed by: OPTOMETRIST

## 2022-03-17 PROCEDURE — 1159F PR MEDICATION LIST DOCUMENTED IN MEDICAL RECORD: ICD-10-PCS | Mod: CPTII,S$GLB,, | Performed by: OPTOMETRIST

## 2022-03-17 NOTE — PROGRESS NOTES
HPI     TRAVIS-3+ yrs ago   Pt is a 57 y.o male here today with complaints of blurred vision at   distance and near and occasional diplopia at distance     Glasses or contacts?none     Sx hx-none   Family hx-none  Gtts-none     Last edited by Darshana Chaudhry on 3/17/2022  9:29 AM. (History)        ROS     Positive for: Endocrine    Negative for: Constitutional, Gastrointestinal, Neurological, Skin,   Genitourinary, Musculoskeletal, HENT, Cardiovascular, Eyes, Respiratory,   Psychiatric, Allergic/Imm, Heme/Lymph    Last edited by Wendy Adan, OD on 3/17/2022 12:50 PM. (History)        Assessment /Plan     For exam results, see Encounter Report.    Combined form of age-related cataract, both eyes    Blurry vision, bilateral  -     Ambulatory referral/consult to Optometry    CI (convergence insufficiency)    Hyperopia with astigmatism and presbyopia, bilateral      Updated and dispensed SV spectacle prescriptions for near and distance. Prism included in near specs due to CI symptoms and patient preference. Successful trial frame for each distance and near Rx. Discussed expectations for prism correction and would like to recheck in 1mo if symptoms are not completely resolved.  Educated on today's exam findings. Monitor cataracts annually.

## 2022-03-17 NOTE — TELEPHONE ENCOUNTER
Spoke to wife scheduled pts follow and her a np exam with     -   ----- Message from Cindy Olivarez sent at 3/17/2022  2:21 PM CDT -----  Regarding: rtn call  Patient's wife was returning a missed call.  She did not know who it was that called her.    Shellie (wife)  @ [phone]

## 2022-04-27 ENCOUNTER — TELEPHONE (OUTPATIENT)
Dept: OPTOMETRY | Facility: CLINIC | Age: 57
End: 2022-04-27
Payer: MEDICARE

## 2022-04-27 NOTE — TELEPHONE ENCOUNTER
Spoke to pt's fiancee, Shellie Rasconleanne, to confirm pt's appt for glasses check 04/27/2022 at 3:00 pm with Dr. Adan. Pt's ezioe requested to have clinic CXL pt's appt due to trouble with insurance and billing. Pt's didi stated that she does not believe that she and pt are in network for insurance. Confirmed with pt's ezioe that she wished to have clinic CXL appt. Ms. Jackson confirmed that she wished to have pt's appt CXL by clinic. Appt CXL per Ms. Jackson's request.

## 2022-05-16 ENCOUNTER — TELEPHONE (OUTPATIENT)
Dept: INTERNAL MEDICINE | Facility: CLINIC | Age: 57
End: 2022-05-16

## 2022-05-16 NOTE — TELEPHONE ENCOUNTER
Mrs. Robison, Shellie, was calling because of several things:    1. Dr. Rodríguez referred him to a Nephrologist. When he went to see the Nephrologist for the consult, the Nephrologist stated that everything was fine without ordering any test for him.   Since switching Hydrochlorothiazide (causes cancer), pt is concerned about side of effects of taking the Hydrochlorothiazide on his body, for example:   2. He has been losing weight without trying  3. Has been experiencing some back pain  4. Urine is dark  5. Need blood panel to make sure everything is working fine    I scheduled him to come for an appt on 6/15 at 3:20 PM. Pt declined sooner appt with other providers.

## 2022-05-16 NOTE — TELEPHONE ENCOUNTER
----- Message from Crystal Vogel sent at 5/16/2022 12:03 PM CDT -----  Regarding: concerns  Name of Who is Calling: Shellie, SO           What is the request in detail: Shellie is requesting a call back in regards to patient complaining of back pain and dark urine and one of his medication was recalled.         Can the clinic reply by [facility]: No           What Number to Call Back if not in [facility]: [phone]

## 2022-05-17 NOTE — TELEPHONE ENCOUNTER
I do not see a note from a nephrologist nor have I referred patient to nephrologist so uncertain on #1.  For #2-5 we can discuss at appt. Should have something available sooner. Contact us if symptoms worsen and needs sooner appt

## 2022-05-19 ENCOUNTER — TELEPHONE (OUTPATIENT)
Dept: INTERNAL MEDICINE | Facility: CLINIC | Age: 57
End: 2022-05-19
Payer: MEDICARE

## 2022-05-23 ENCOUNTER — PES CALL (OUTPATIENT)
Dept: ADMINISTRATIVE | Facility: CLINIC | Age: 57
End: 2022-05-23
Payer: MEDICARE

## 2022-06-15 ENCOUNTER — OFFICE VISIT (OUTPATIENT)
Dept: INTERNAL MEDICINE | Facility: CLINIC | Age: 57
End: 2022-06-15
Attending: INTERNAL MEDICINE
Payer: MEDICARE

## 2022-06-15 ENCOUNTER — LAB VISIT (OUTPATIENT)
Dept: LAB | Facility: OTHER | Age: 57
End: 2022-06-15
Attending: INTERNAL MEDICINE
Payer: MEDICARE

## 2022-06-15 VITALS
HEART RATE: 108 BPM | OXYGEN SATURATION: 96 % | HEIGHT: 69 IN | DIASTOLIC BLOOD PRESSURE: 96 MMHG | WEIGHT: 214.5 LBS | SYSTOLIC BLOOD PRESSURE: 144 MMHG | BODY MASS INDEX: 31.77 KG/M2

## 2022-06-15 DIAGNOSIS — Z12.5 PROSTATE CANCER SCREENING: ICD-10-CM

## 2022-06-15 DIAGNOSIS — R63.4 WEIGHT LOSS: ICD-10-CM

## 2022-06-15 DIAGNOSIS — I10 BENIGN ESSENTIAL HYPERTENSION: Primary | ICD-10-CM

## 2022-06-15 DIAGNOSIS — E78.2 MIXED HYPERLIPIDEMIA: ICD-10-CM

## 2022-06-15 LAB
BASOPHILS # BLD AUTO: 0.05 K/UL (ref 0–0.2)
BASOPHILS NFR BLD: 0.5 % (ref 0–1.9)
COMPLEXED PSA SERPL-MCNC: 0.29 NG/ML (ref 0–4)
DIFFERENTIAL METHOD: ABNORMAL
EOSINOPHIL # BLD AUTO: 0.1 K/UL (ref 0–0.5)
EOSINOPHIL NFR BLD: 0.6 % (ref 0–8)
ERYTHROCYTE [DISTWIDTH] IN BLOOD BY AUTOMATED COUNT: 12.4 % (ref 11.5–14.5)
HCT VFR BLD AUTO: 49 % (ref 40–54)
HGB BLD-MCNC: 16.8 G/DL (ref 14–18)
IMM GRANULOCYTES # BLD AUTO: 0.03 K/UL (ref 0–0.04)
IMM GRANULOCYTES NFR BLD AUTO: 0.3 % (ref 0–0.5)
LYMPHOCYTES # BLD AUTO: 2 K/UL (ref 1–4.8)
LYMPHOCYTES NFR BLD: 18.9 % (ref 18–48)
MCH RBC QN AUTO: 31.7 PG (ref 27–31)
MCHC RBC AUTO-ENTMCNC: 34.3 G/DL (ref 32–36)
MCV RBC AUTO: 93 FL (ref 82–98)
MONOCYTES # BLD AUTO: 1.2 K/UL (ref 0.3–1)
MONOCYTES NFR BLD: 11.2 % (ref 4–15)
NEUTROPHILS # BLD AUTO: 7.1 K/UL (ref 1.8–7.7)
NEUTROPHILS NFR BLD: 68.5 % (ref 38–73)
NRBC BLD-RTO: 0 /100 WBC
PLATELET # BLD AUTO: 319 K/UL (ref 150–450)
PMV BLD AUTO: 10 FL (ref 9.2–12.9)
RBC # BLD AUTO: 5.3 M/UL (ref 4.6–6.2)
TSH SERPL DL<=0.005 MIU/L-ACNC: 0.65 UIU/ML (ref 0.4–4)
WBC # BLD AUTO: 10.41 K/UL (ref 3.9–12.7)

## 2022-06-15 PROCEDURE — 99999 PR PBB SHADOW E&M-EST. PATIENT-LVL III: ICD-10-PCS | Mod: PBBFAC,,, | Performed by: INTERNAL MEDICINE

## 2022-06-15 PROCEDURE — 3008F PR BODY MASS INDEX (BMI) DOCUMENTED: ICD-10-PCS | Mod: CPTII,S$GLB,, | Performed by: INTERNAL MEDICINE

## 2022-06-15 PROCEDURE — 99214 PR OFFICE/OUTPT VISIT, EST, LEVL IV, 30-39 MIN: ICD-10-PCS | Mod: S$GLB,,, | Performed by: INTERNAL MEDICINE

## 2022-06-15 PROCEDURE — 3080F DIAST BP >= 90 MM HG: CPT | Mod: CPTII,S$GLB,, | Performed by: INTERNAL MEDICINE

## 2022-06-15 PROCEDURE — 1159F PR MEDICATION LIST DOCUMENTED IN MEDICAL RECORD: ICD-10-PCS | Mod: CPTII,S$GLB,, | Performed by: INTERNAL MEDICINE

## 2022-06-15 PROCEDURE — 85025 COMPLETE CBC W/AUTO DIFF WBC: CPT | Performed by: INTERNAL MEDICINE

## 2022-06-15 PROCEDURE — 3008F BODY MASS INDEX DOCD: CPT | Mod: CPTII,S$GLB,, | Performed by: INTERNAL MEDICINE

## 2022-06-15 PROCEDURE — 84443 ASSAY THYROID STIM HORMONE: CPT | Performed by: INTERNAL MEDICINE

## 2022-06-15 PROCEDURE — 99999 PR PBB SHADOW E&M-EST. PATIENT-LVL III: CPT | Mod: PBBFAC,,, | Performed by: INTERNAL MEDICINE

## 2022-06-15 PROCEDURE — 3077F SYST BP >= 140 MM HG: CPT | Mod: CPTII,S$GLB,, | Performed by: INTERNAL MEDICINE

## 2022-06-15 PROCEDURE — 1159F MED LIST DOCD IN RCRD: CPT | Mod: CPTII,S$GLB,, | Performed by: INTERNAL MEDICINE

## 2022-06-15 PROCEDURE — 99214 OFFICE O/P EST MOD 30 MIN: CPT | Mod: S$GLB,,, | Performed by: INTERNAL MEDICINE

## 2022-06-15 PROCEDURE — 3080F PR MOST RECENT DIASTOLIC BLOOD PRESSURE >= 90 MM HG: ICD-10-PCS | Mod: CPTII,S$GLB,, | Performed by: INTERNAL MEDICINE

## 2022-06-15 PROCEDURE — 84153 ASSAY OF PSA TOTAL: CPT | Performed by: INTERNAL MEDICINE

## 2022-06-15 PROCEDURE — 36415 COLL VENOUS BLD VENIPUNCTURE: CPT | Performed by: INTERNAL MEDICINE

## 2022-06-15 PROCEDURE — 3077F PR MOST RECENT SYSTOLIC BLOOD PRESSURE >= 140 MM HG: ICD-10-PCS | Mod: CPTII,S$GLB,, | Performed by: INTERNAL MEDICINE

## 2022-06-15 RX ORDER — LEVOCETIRIZINE DIHYDROCHLORIDE 5 MG/1
TABLET, FILM COATED ORAL
COMMUNITY
Start: 2022-03-10 | End: 2024-03-26

## 2022-06-15 RX ORDER — ATORVASTATIN CALCIUM 40 MG/1
40 TABLET, FILM COATED ORAL DAILY
Qty: 90 TABLET | Refills: 3 | Status: SHIPPED | OUTPATIENT
Start: 2022-06-15 | End: 2023-08-21 | Stop reason: SDUPTHER

## 2022-06-15 NOTE — PROGRESS NOTES
"Subjective:       Patient ID: Stevenson Robison Jr. is a 57 y.o. male.    Chief Complaint: Follow-up    Here for urgent visit    Younger brother passed 2 days prior. Had underlying heart trouble but unclear exact diagnosis.  The 10-year ASCVD risk score (Fransisco GAYTAN Jr., et al., 2013) is: 11.3%    Values used to calculate the score:      Age: 57 years      Sex: Male      Is Non- : No      Diabetic: No      Tobacco smoker: No      Systolic Blood Pressure: 144 mmHg      Is BP treated: Yes      HDL Cholesterol: 43 mg/dL      Total Cholesterol: 209 mg/dL      Review of Systems   Constitutional: Negative for appetite change, chills, fever and unexpected weight change.   HENT: Negative for hearing loss, sore throat and trouble swallowing.    Eyes: Negative for visual disturbance.   Respiratory: Negative for cough, chest tightness and shortness of breath.    Cardiovascular: Negative for chest pain and leg swelling.   Gastrointestinal: Negative for abdominal pain, blood in stool, constipation, diarrhea, nausea and vomiting.   Endocrine: Negative for polydipsia and polyuria.   Genitourinary: Negative for decreased urine volume, difficulty urinating, dysuria, frequency and urgency.   Musculoskeletal: Negative for gait problem.   Skin: Negative for rash.   Neurological: Negative for dizziness and numbness.   Psychiatric/Behavioral: The patient is not nervous/anxious.        Objective:      Vitals:    06/15/22 1447 06/15/22 1528   BP: (!) 156/86 (!) 144/96   Pulse: 108    SpO2: 96%    Weight: 97.3 kg (214 lb 8.1 oz)    Height: 5' 9" (1.753 m)       Physical Exam  Constitutional:       General: He is not in acute distress.     Appearance: He is well-developed.   HENT:      Head: Normocephalic and atraumatic.      Mouth/Throat:      Pharynx: No oropharyngeal exudate.   Eyes:      General: No scleral icterus.     Conjunctiva/sclera: Conjunctivae normal.      Pupils: Pupils are equal, round, and reactive to light. "   Neck:      Thyroid: No thyromegaly.   Cardiovascular:      Rate and Rhythm: Normal rate and regular rhythm.      Heart sounds: Normal heart sounds. No murmur heard.  Pulmonary:      Effort: Pulmonary effort is normal.      Breath sounds: Normal breath sounds. No wheezing or rales.   Abdominal:      General: There is no distension.      Palpations: Abdomen is soft.      Tenderness: There is no abdominal tenderness.   Musculoskeletal:         General: No tenderness.   Lymphadenopathy:      Cervical: No cervical adenopathy.   Skin:     General: Skin is warm and dry.   Neurological:      Mental Status: He is alert and oriented to person, place, and time.   Psychiatric:         Behavior: Behavior normal.         Assessment:       1. Benign essential hypertension    2. Weight loss    3. Prostate cancer screening    4. Mixed hyperlipidemia        Plan:       Stevenson was seen today for follow-up.    Diagnoses and all orders for this visit:    Benign essential hypertension   Pt visibly distressed. No change in meds today. f/u in 7-10 days for nurse visit for BP check    Weight loss  -     PSA, Screening; Future  -     CBC Auto Differential; Future  -     TSH; Future  -     URINALYSIS    Prostate cancer screening  -     PSA, Screening; Future    Mixed hyperlipidemia]  -     atorvastatin (LIPITOR) 40 MG tablet; Take 1 tablet (40 mg total) by mouth once daily.           Ty Whatley MD  Internal Medicine-[facility]        Side effects of medication(s) were discussed in detail and patient voiced understanding.  Patient will call back for any issues or complications.

## 2022-07-19 ENCOUNTER — PES CALL (OUTPATIENT)
Dept: ADMINISTRATIVE | Facility: CLINIC | Age: 57
End: 2022-07-19
Payer: MEDICARE

## 2022-08-04 ENCOUNTER — OFFICE VISIT (OUTPATIENT)
Dept: INTERNAL MEDICINE | Facility: CLINIC | Age: 57
End: 2022-08-04
Payer: MEDICARE

## 2022-08-04 VITALS
SYSTOLIC BLOOD PRESSURE: 120 MMHG | DIASTOLIC BLOOD PRESSURE: 84 MMHG | WEIGHT: 208.75 LBS | OXYGEN SATURATION: 100 % | HEIGHT: 70 IN | HEART RATE: 97 BPM | BODY MASS INDEX: 29.89 KG/M2

## 2022-08-04 DIAGNOSIS — K13.70 ORAL LESION: ICD-10-CM

## 2022-08-04 DIAGNOSIS — Z00.00 ENCOUNTER FOR PREVENTIVE HEALTH EXAMINATION: Primary | ICD-10-CM

## 2022-08-04 DIAGNOSIS — M25.662 DECREASED RANGE OF MOTION OF LEFT KNEE: ICD-10-CM

## 2022-08-04 DIAGNOSIS — I10 BENIGN ESSENTIAL HYPERTENSION: ICD-10-CM

## 2022-08-04 DIAGNOSIS — M54.16 LUMBAR BACK PAIN WITH RADICULOPATHY AFFECTING RIGHT LOWER EXTREMITY: ICD-10-CM

## 2022-08-04 DIAGNOSIS — J45.30 MILD PERSISTENT ASTHMA WITHOUT COMPLICATION: ICD-10-CM

## 2022-08-04 DIAGNOSIS — M62.81 QUADRICEPS WEAKNESS: ICD-10-CM

## 2022-08-04 DIAGNOSIS — E78.2 MIXED HYPERLIPIDEMIA: ICD-10-CM

## 2022-08-04 DIAGNOSIS — E66.01 SEVERE OBESITY (BMI 35.0-39.9) WITH COMORBIDITY: ICD-10-CM

## 2022-08-04 PROCEDURE — 3008F BODY MASS INDEX DOCD: CPT | Mod: CPTII,S$GLB,, | Performed by: NURSE PRACTITIONER

## 2022-08-04 PROCEDURE — 3074F PR MOST RECENT SYSTOLIC BLOOD PRESSURE < 130 MM HG: ICD-10-PCS | Mod: CPTII,S$GLB,, | Performed by: NURSE PRACTITIONER

## 2022-08-04 PROCEDURE — 1160F PR REVIEW ALL MEDS BY PRESCRIBER/CLIN PHARMACIST DOCUMENTED: ICD-10-PCS | Mod: CPTII,S$GLB,, | Performed by: NURSE PRACTITIONER

## 2022-08-04 PROCEDURE — 3008F PR BODY MASS INDEX (BMI) DOCUMENTED: ICD-10-PCS | Mod: CPTII,S$GLB,, | Performed by: NURSE PRACTITIONER

## 2022-08-04 PROCEDURE — 3074F SYST BP LT 130 MM HG: CPT | Mod: CPTII,S$GLB,, | Performed by: NURSE PRACTITIONER

## 2022-08-04 PROCEDURE — 99999 PR PBB SHADOW E&M-EST. PATIENT-LVL IV: CPT | Mod: PBBFAC,,, | Performed by: NURSE PRACTITIONER

## 2022-08-04 PROCEDURE — G0439 PR MEDICARE ANNUAL WELLNESS SUBSEQUENT VISIT: ICD-10-PCS | Mod: S$GLB,,, | Performed by: NURSE PRACTITIONER

## 2022-08-04 PROCEDURE — G0439 PPPS, SUBSEQ VISIT: HCPCS | Mod: S$GLB,,, | Performed by: NURSE PRACTITIONER

## 2022-08-04 PROCEDURE — G9919 PR SCREENING AND POSITIVE: ICD-10-PCS | Mod: CPTII,S$GLB,, | Performed by: NURSE PRACTITIONER

## 2022-08-04 PROCEDURE — G9919 SCRN ND POS ND PROV OF REC: HCPCS | Mod: CPTII,S$GLB,, | Performed by: NURSE PRACTITIONER

## 2022-08-04 PROCEDURE — 99999 PR PBB SHADOW E&M-EST. PATIENT-LVL IV: ICD-10-PCS | Mod: PBBFAC,,, | Performed by: NURSE PRACTITIONER

## 2022-08-04 PROCEDURE — 1159F PR MEDICATION LIST DOCUMENTED IN MEDICAL RECORD: ICD-10-PCS | Mod: CPTII,S$GLB,, | Performed by: NURSE PRACTITIONER

## 2022-08-04 PROCEDURE — 1159F MED LIST DOCD IN RCRD: CPT | Mod: CPTII,S$GLB,, | Performed by: NURSE PRACTITIONER

## 2022-08-04 PROCEDURE — 1160F RVW MEDS BY RX/DR IN RCRD: CPT | Mod: CPTII,S$GLB,, | Performed by: NURSE PRACTITIONER

## 2022-08-04 PROCEDURE — 3079F DIAST BP 80-89 MM HG: CPT | Mod: CPTII,S$GLB,, | Performed by: NURSE PRACTITIONER

## 2022-08-04 PROCEDURE — 3079F PR MOST RECENT DIASTOLIC BLOOD PRESSURE 80-89 MM HG: ICD-10-PCS | Mod: CPTII,S$GLB,, | Performed by: NURSE PRACTITIONER

## 2022-08-04 NOTE — PATIENT INSTRUCTIONS
Counseling and Referral of Other Preventative  (Italic type indicates deductible and co-insurance are waived)    Patient Name: Stevenson Robison  Today's Date: 8/4/2022    Health Maintenance       Date Due Completion Date    Shingles Vaccine (1 of 2) Never done ---    COVID-19 Vaccine (3 - Booster for Pfizer series) 01/20/2022 8/20/2021    Colorectal Cancer Screening 02/23/2022 2/23/2017    Influenza Vaccine (1) 09/01/2022 9/6/2019    Lipid Panel 11/23/2026 11/23/2021    TETANUS VACCINE 10/18/2028 10/18/2018        No orders of the defined types were placed in this encounter.    The following information is provided to all patients.  This information is to help you find resources for any of the problems found today that may be affecting your health:                Living healthy guide: www.[facility].[place].gov      Understanding Diabetes: www.diabetes.org      Eating healthy: www.cdc.gov/healthyweight      CDC home safety checklist: www.cdc.gov/steadi/patient.html      Agency on Aging: www.goea.[place].gov      Alcoholics anonymous (AA): www.aa.org      Physical Activity: www.giovany.nih.gov/pj9xtyv      Tobacco use: www.quitwithusla.org

## 2022-08-04 NOTE — PROGRESS NOTES
"  Stevenson Robison presented for a  Medicare AWV and comprehensive Health Risk Assessment today. The following components were reviewed and updated:    · Medical history  · Family History  · Social history  · Allergies and Current Medications  · Health Risk Assessment  · Health Maintenance  · Care Team     Patient screened moderate and/or high risk for one or more social determinants of health (SDOH). Patient connected to community resources through the ED Navigator.      ** See Completed Assessments for Annual Wellness Visit within the encounter summary.**         The following assessments were completed:  · Living Situation  · CAGE  · Depression Screening  · Timed Get Up and Go  · Whisper Test  · Cognitive Function Screening  · Nutrition Screening  · ADL Screening  · PAQ Screening            Vitals:    08/04/22 1251   BP: (!) 150/94   BP Location: Left arm   Patient Position: Sitting   BP Method: Medium (Manual)   Pulse: (!) 114   SpO2: 100%   Weight: 94.7 kg (208 lb 12.4 oz)   Height: 5' 10" (1.778 m)     Body mass index is 29.96 kg/m².     Physical Exam  Constitutional:       Appearance: He is well-developed.   HENT:      Head: Normocephalic and atraumatic. Not macrocephalic and not microcephalic. No raccoon eyes, Vences's sign, abrasion, contusion, right periorbital erythema, left periorbital erythema or laceration. Hair is normal.      Right Ear: No decreased hearing noted. No laceration, drainage, swelling or tenderness. No middle ear effusion. No foreign body. No mastoid tenderness. No hemotympanum. Tympanic membrane is not injected, scarred, perforated, erythematous, retracted or bulging. Tympanic membrane has normal mobility.      Left Ear: No decreased hearing noted. No laceration, drainage, swelling or tenderness.  No middle ear effusion. No foreign body. No mastoid tenderness. No hemotympanum. Tympanic membrane is not injected, scarred, perforated, erythematous, retracted or bulging. Tympanic membrane has " normal mobility.      Nose: Nose normal. No nasal deformity, laceration or mucosal edema.      Mouth/Throat:      Pharynx: Uvula midline.   Eyes:      General: Lids are normal. No scleral icterus.     Conjunctiva/sclera: Conjunctivae normal.   Neck:      Thyroid: No thyroid mass or thyromegaly.      Trachea: Trachea normal.   Cardiovascular:      Rate and Rhythm: Normal rate and regular rhythm.   Pulmonary:      Effort: Pulmonary effort is normal. No respiratory distress.      Breath sounds: Normal breath sounds.   Abdominal:      Palpations: Abdomen is soft.   Musculoskeletal:         General: Normal range of motion.      Cervical back: Neck supple. No edema or erythema. No spinous process tenderness or muscular tenderness. Normal range of motion.   Lymphadenopathy:      Head:      Right side of head: No submental, submandibular, tonsillar, preauricular or posterior auricular adenopathy.      Left side of head: No submental, submandibular, tonsillar, preauricular, posterior auricular or occipital adenopathy.   Skin:     General: Skin is warm and dry.   Neurological:      Mental Status: He is alert and oriented to person, place, and time.   Psychiatric:         Behavior: Behavior normal.         Thought Content: Thought content normal.         Judgment: Judgment normal.             Diagnoses and health risks identified today and associated recommendations/orders:    1. Encounter for preventive health examination  Annual Health Risk Assessment (HRA) visit today.  Counseling and referral of health maintenance and preventative health measures performed.  Patient given annual wellness paperwork to take home.  Encouraged to return in 1 year for subsequent HRA visit.     2. Mild persistent asthma without complication  Chronic. Stable. Continue current treatment plan as previously prescribed by PCP.    3. Oral lesion  Chronic. Stable. Continue current treatment plan as previously prescribed by PCP.    4. Lumbar back pain  with radiculopathy affecting bilateral lower extremity  Chronic. Stable. Continue current treatment plan as previously prescribed by PCP.    5. Mixed hyperlipidemia  Chronic. Stable. Continue current treatment plan as previously prescribed by PCP.    6. Benign essential hypertension  Chronic. Stable. Controlled. Continue current treatment plan as previously prescribed by PCP.    7. Severe obesity (BMI 35.0-39.9) with comorbidity  Chronic. Stable. Encouraged to increase exercise as tolerated and improve diet to heart healthy, low sodium diet. Continue current treatment plan as previously prescribed by PCP.    8. Quadriceps weakness  Chronic. Stable. Continue current treatment plan as previously prescribed by PCP.    9. Decreased range of motion of left knee  Chronic. Stable. Continue current treatment plan as previously prescribed by PCP.        Provided Stevenson with a 5-10 year written screening schedule and personal prevention plan. Recommendations were developed using the USPSTF age appropriate recommendations. Education, counseling, and referrals were provided as needed. After Visit Summary printed and given to patient which includes a list of additional screenings\tests needed.      I offered to discuss end of life issues, including information on how to make advance directives that the patient could use to name someone who would make medical decisions on their behalf if they became too ill to make themselves.    ___Patient declined  _X_Patient is interested, I provided paper work and offered to discuss.    No follow-ups on file.    Harrison Fitzgerald NP  I offered to discuss advanced care planning, including how to pick a person who would make decisions for you if you were unable to make them for yourself, called a health care power of , and what kind of decisions you might make such as use of life sustaining treatments such as ventilators and tube feeding when faced with a life limiting illness recorded  on a living will that they will need to know. (How you want to be cared for as you near the end of your natural life)     X Patient is interested in learning more about how to make advanced directives.  I provided them paperwork and offered to discuss this with them.

## 2022-08-05 ENCOUNTER — OFFICE VISIT (OUTPATIENT)
Dept: INTERNAL MEDICINE | Facility: CLINIC | Age: 57
End: 2022-08-05
Attending: INTERNAL MEDICINE
Payer: MEDICARE

## 2022-08-05 ENCOUNTER — LAB VISIT (OUTPATIENT)
Dept: LAB | Facility: OTHER | Age: 57
End: 2022-08-05
Attending: INTERNAL MEDICINE
Payer: MEDICARE

## 2022-08-05 VITALS
HEIGHT: 70 IN | SYSTOLIC BLOOD PRESSURE: 150 MMHG | HEART RATE: 74 BPM | DIASTOLIC BLOOD PRESSURE: 102 MMHG | BODY MASS INDEX: 30.23 KG/M2 | WEIGHT: 211.19 LBS | OXYGEN SATURATION: 97 %

## 2022-08-05 DIAGNOSIS — R63.4 WEIGHT LOSS: Primary | ICD-10-CM

## 2022-08-05 DIAGNOSIS — R63.4 WEIGHT LOSS: ICD-10-CM

## 2022-08-05 DIAGNOSIS — R79.9 ABNORMAL FINDING OF BLOOD CHEMISTRY, UNSPECIFIED: ICD-10-CM

## 2022-08-05 DIAGNOSIS — I10 BENIGN ESSENTIAL HYPERTENSION: ICD-10-CM

## 2022-08-05 DIAGNOSIS — N28.9 KIDNEY LESION: ICD-10-CM

## 2022-08-05 LAB
CHOLEST SERPL-MCNC: 152 MG/DL (ref 120–199)
CHOLEST/HDLC SERPL: 2.9 {RATIO} (ref 2–5)
CRP SERPL-MCNC: 3.5 MG/L (ref 0–8.2)
ERYTHROCYTE [SEDIMENTATION RATE] IN BLOOD: 16 MM/HR (ref 0–10)
HDLC SERPL-MCNC: 52 MG/DL (ref 40–75)
HDLC SERPL: 34.2 % (ref 20–50)
LDLC SERPL CALC-MCNC: 89.2 MG/DL (ref 63–159)
NONHDLC SERPL-MCNC: 100 MG/DL
T3 SERPL-MCNC: 131 NG/DL (ref 60–180)
T4 FREE SERPL-MCNC: 1 NG/DL (ref 0.71–1.51)
TRIGL SERPL-MCNC: 54 MG/DL (ref 30–150)
TSH SERPL DL<=0.005 MIU/L-ACNC: 0.74 UIU/ML (ref 0.4–4)

## 2022-08-05 PROCEDURE — 84439 ASSAY OF FREE THYROXINE: CPT | Performed by: INTERNAL MEDICINE

## 2022-08-05 PROCEDURE — 85651 RBC SED RATE NONAUTOMATED: CPT | Performed by: INTERNAL MEDICINE

## 2022-08-05 PROCEDURE — 3077F PR MOST RECENT SYSTOLIC BLOOD PRESSURE >= 140 MM HG: ICD-10-PCS | Mod: CPTII,S$GLB,, | Performed by: INTERNAL MEDICINE

## 2022-08-05 PROCEDURE — 3080F PR MOST RECENT DIASTOLIC BLOOD PRESSURE >= 90 MM HG: ICD-10-PCS | Mod: CPTII,S$GLB,, | Performed by: INTERNAL MEDICINE

## 2022-08-05 PROCEDURE — 3077F SYST BP >= 140 MM HG: CPT | Mod: CPTII,S$GLB,, | Performed by: INTERNAL MEDICINE

## 2022-08-05 PROCEDURE — 3008F BODY MASS INDEX DOCD: CPT | Mod: CPTII,S$GLB,, | Performed by: INTERNAL MEDICINE

## 2022-08-05 PROCEDURE — 1160F PR REVIEW ALL MEDS BY PRESCRIBER/CLIN PHARMACIST DOCUMENTED: ICD-10-PCS | Mod: CPTII,S$GLB,, | Performed by: INTERNAL MEDICINE

## 2022-08-05 PROCEDURE — 86140 C-REACTIVE PROTEIN: CPT | Performed by: INTERNAL MEDICINE

## 2022-08-05 PROCEDURE — 87389 HIV-1 AG W/HIV-1&-2 AB AG IA: CPT | Performed by: INTERNAL MEDICINE

## 2022-08-05 PROCEDURE — 3080F DIAST BP >= 90 MM HG: CPT | Mod: CPTII,S$GLB,, | Performed by: INTERNAL MEDICINE

## 2022-08-05 PROCEDURE — 99999 PR PBB SHADOW E&M-EST. PATIENT-LVL IV: CPT | Mod: PBBFAC,,, | Performed by: INTERNAL MEDICINE

## 2022-08-05 PROCEDURE — 3008F PR BODY MASS INDEX (BMI) DOCUMENTED: ICD-10-PCS | Mod: CPTII,S$GLB,, | Performed by: INTERNAL MEDICINE

## 2022-08-05 PROCEDURE — 84480 ASSAY TRIIODOTHYRONINE (T3): CPT | Performed by: INTERNAL MEDICINE

## 2022-08-05 PROCEDURE — 84443 ASSAY THYROID STIM HORMONE: CPT | Performed by: INTERNAL MEDICINE

## 2022-08-05 PROCEDURE — 1159F MED LIST DOCD IN RCRD: CPT | Mod: CPTII,S$GLB,, | Performed by: INTERNAL MEDICINE

## 2022-08-05 PROCEDURE — 99214 OFFICE O/P EST MOD 30 MIN: CPT | Mod: S$GLB,,, | Performed by: INTERNAL MEDICINE

## 2022-08-05 PROCEDURE — 80061 LIPID PANEL: CPT | Performed by: INTERNAL MEDICINE

## 2022-08-05 PROCEDURE — 36415 COLL VENOUS BLD VENIPUNCTURE: CPT | Performed by: INTERNAL MEDICINE

## 2022-08-05 PROCEDURE — 1159F PR MEDICATION LIST DOCUMENTED IN MEDICAL RECORD: ICD-10-PCS | Mod: CPTII,S$GLB,, | Performed by: INTERNAL MEDICINE

## 2022-08-05 PROCEDURE — 99999 PR PBB SHADOW E&M-EST. PATIENT-LVL IV: ICD-10-PCS | Mod: PBBFAC,,, | Performed by: INTERNAL MEDICINE

## 2022-08-05 PROCEDURE — 99214 PR OFFICE/OUTPT VISIT, EST, LEVL IV, 30-39 MIN: ICD-10-PCS | Mod: S$GLB,,, | Performed by: INTERNAL MEDICINE

## 2022-08-05 PROCEDURE — 86803 HEPATITIS C AB TEST: CPT | Performed by: INTERNAL MEDICINE

## 2022-08-05 PROCEDURE — 1160F RVW MEDS BY RX/DR IN RCRD: CPT | Mod: CPTII,S$GLB,, | Performed by: INTERNAL MEDICINE

## 2022-08-05 RX ORDER — AMLODIPINE BESYLATE 5 MG/1
5 TABLET ORAL DAILY
Qty: 90 TABLET | Refills: 2 | Status: SHIPPED | OUTPATIENT
Start: 2022-08-05 | End: 2023-05-26 | Stop reason: SDUPTHER

## 2022-08-05 NOTE — PROGRESS NOTES
"Subjective:       Patient ID: Stevenson Robison Jr. is a 57 y.o. male.    Chief Complaint: Weight Loss and Hypertension (Mass on kidney)    Her for f/u    ### HTN ###  HCTZ 25  8/5/22 BP remains elevated.  Reports frequent HA as of late. Not currently. No current blurry ision or recent CP. No dizziness.     Home BP  120/90 average  150/96 last night      Review of Systems   Constitutional: Negative for appetite change, chills, fever and unexpected weight change.   HENT: Negative for hearing loss, sore throat and trouble swallowing.    Eyes: Negative for visual disturbance.   Respiratory: Negative for cough, chest tightness and shortness of breath.    Cardiovascular: Negative for chest pain and leg swelling.   Gastrointestinal: Negative for abdominal pain, blood in stool, constipation, diarrhea, nausea and vomiting.   Endocrine: Negative for polydipsia and polyuria.   Genitourinary: Negative for decreased urine volume, difficulty urinating, dysuria, frequency and urgency.   Musculoskeletal: Negative for gait problem.   Skin: Negative for rash.   Neurological: Negative for dizziness and numbness.   Psychiatric/Behavioral: The patient is not nervous/anxious.        Objective:      Vitals:    08/05/22 1119   BP: (!) 150/102   Pulse: 74   SpO2: 97%   Weight: 95.8 kg (211 lb 3.2 oz)   Height: 5' 10" (1.778 m)      Physical Exam  Constitutional:       General: He is not in acute distress.     Appearance: He is well-developed.   HENT:      Head: Normocephalic and atraumatic.      Mouth/Throat:      Pharynx: No oropharyngeal exudate.   Eyes:      General: No scleral icterus.     Conjunctiva/sclera: Conjunctivae normal.      Pupils: Pupils are equal, round, and reactive to light.   Neck:      Thyroid: No thyromegaly.   Cardiovascular:      Rate and Rhythm: Normal rate and regular rhythm.      Heart sounds: Normal heart sounds. No murmur heard.  Pulmonary:      Effort: Pulmonary effort is normal.      Breath sounds: Normal breath " sounds. No wheezing or rales.   Abdominal:      General: There is no distension.      Palpations: Abdomen is soft.      Tenderness: There is no abdominal tenderness.   Musculoskeletal:         General: No tenderness.   Lymphadenopathy:      Cervical: No cervical adenopathy.   Skin:     General: Skin is warm and dry.   Neurological:      Mental Status: He is alert and oriented to person, place, and time.   Psychiatric:         Behavior: Behavior normal.         Assessment:       1. Weight loss    2. Kidney lesion    3. Abnormal finding of blood chemistry, unspecified     4. Benign essential hypertension        Plan:       Stevenson was seen today for weight loss and hypertension.    Diagnoses and all orders for this visit:    Weight loss  I believe this is intentional.   -     TSH; Future  -     T3; Future  -     T4, FREE; Future  -     Lipid Panel; Future  -     Sedimentation rate; Future  -     C-REACTIVE PROTEIN; Future  -     Hepatitis C Antibody; Future  -     HIV 1/2 Ag/Ab (4th Gen); Future    Kidney lesion  -     US Retroperitoneal Complete (Kidney and; Future    Abnormal finding of blood chemistry, unspecified   -     Lipid Panel; Future    Benign essential hypertension  -     ADD amLODIPine (NORVASC) 5 MG tablet; Take 1 tablet (5 mg total) by mouth once daily.   f/u in 7-10 days for nurse visit for BP check             Ty Whatley MD  Internal Medicine-[facility]        Side effects of medication(s) were discussed in detail and patient voiced understanding.  Patient will call back for any issues or complications.

## 2022-08-08 ENCOUNTER — NURSE TRIAGE (OUTPATIENT)
Dept: ADMINISTRATIVE | Facility: CLINIC | Age: 57
End: 2022-08-08
Payer: MEDICARE

## 2022-08-08 ENCOUNTER — TELEPHONE (OUTPATIENT)
Dept: INTERNAL MEDICINE | Facility: CLINIC | Age: 57
End: 2022-08-08
Payer: MEDICARE

## 2022-08-08 LAB — HIV 1+2 AB+HIV1 P24 AG SERPL QL IA: NEGATIVE

## 2022-08-08 NOTE — TELEPHONE ENCOUNTER
Patient s.o. calling on behalf of patient who is on 3 way. States they spoke to a triage nurse earlier who she wanted to follow up with. States patient is feeling better but had not received a call back from PCP office. Reviewed chart, advised s.o. and patient the office was trying to contact patient but was not having a good connection. Advised to call office back. Verbalized understanding. Advised to call back if symptoms become worse or with further questions.        Reason for Disposition   Caller has already spoken to PCP or another triager    Protocols used: INFORMATION ONLY CALL - NO TRIAGE-A-

## 2022-08-08 NOTE — TELEPHONE ENCOUNTER
Mrs. Jackson is not with the patient currently, he is in an area with very poor cell reception, she can contact him to answer my questions via text/FB messenger.  He has been having occasional headaches that began in June, but the headaches worsened over the last week, and she states he's had some episodes that coincided with the HA's, where his face was very red, and he felt like his heart was pounding.  He has also been having some episodes of dizziness.   He has been seen twice this week and on Friday was started on Amlodipine ( and he is already on HCTZ).  His bp this am at 0700 was 143/60, HR 53, and he is c/o headache and feeling dizzy.  She is going to try to get him on the line, will call me back shortly.  He is now available for Triage, states current bp is 134/81, pulse 57, denies any dizziness or other neuro or cardiac symptoms, just c/o severe headache, rates it 8/10 in severity.  He has not taken anything for it, I recommended he take an otc analgesic for the headache and drink some extra fluid.  Will contact Dr. Rodríguez and have him contact the patient's s.o. within the hour with additional care advice.    Reason for Disposition   Systolic BP >= 130 OR Diastolic >= 80, and is taking BP medications    Additional Information   Negative: Sounds like a life-threatening emergency to the triager   Negative: Symptom is main concern (e.g., headache, chest pain)   Negative: Low blood pressure is main concern   Negative: Pregnant 20 or more weeks or postpartum (< 6 weeks after delivery) with new hand or face swelling   Negative: Pregnant 20 or more weeks or postpartum with Systolic BP >= 140 OR Diastolic >= 90   Negative: Systolic BP >= 200 OR Diastolic >= 120 and having NO cardiac or neurologic symptoms   Negative: Systolic BP >= 180 OR Diastolic >= 110, and missed most recent dose of blood pressure medication   Negative: Systolic BP >= 160 OR Diastolic >= 100   Negative: Systolic BP >= 130 OR  Diastolic >= 80, and pregnant   Negative: Systolic BP >= 180 OR Diastolic >= 110   Negative: Ran out of BP medications   Negative: Systolic BP >= 160 OR Diastolic >= 100, and any cardiac or neurologic symptoms (e.g., chest pain, difficulty breathing, unsteady gait, blurred vision)     Headache and dizziness   Negative: Patient sounds very sick or weak to the triager   Negative: Patient wants to be seen   Negative: Taking BP medications and feels is having side effects (e.g., impotence, cough, dizziness)    Protocols used: BLOOD PRESSURE - HIGH-A-OH

## 2022-08-08 NOTE — TELEPHONE ENCOUNTER
Spoke with friend Shellie (from another call back encounter.)    Pt states he's feeling better after Excedrin Migraine and his main question was if he should be taking HCTZ AND Amlodipine (they are on a multi call scenario). Informed that yes, per PCP notes he is to add Aml to his HCTZ.    He will wait until Friday's Nurse Visit to come in.    They want to know if it's ok for him to take HCTZ in AM and Amlodipine in PM? I agreed this if fine and will run by PCP - if he wants this changed, we will call back.    Encouraged pt to call back for any needs.

## 2022-08-08 NOTE — TELEPHONE ENCOUNTER
Called pt but pt said he could not hear me but his HA went a way and that his fiance will call me. And pt just hung up on. He sounded like he was little off.     I  Attempted to call Mrs. Hensley and  said phone was out of service

## 2022-08-08 NOTE — TELEPHONE ENCOUNTER
"Please offer pt visit today if he would like. Ok to overbook "fast pass" whatever that is or my 1:40  "

## 2022-08-08 NOTE — TELEPHONE ENCOUNTER
----- Message from Negin Santos sent at 8/8/2022 10:57 AM CDT -----  Type: Patient Call Back    Who called:pt's louisa geiger [phone]    What is the request in detail:returning a call from one of the nurses in regards to which medications is he supposed to take. Doesn't know which one. Call [phone] because the pt is having bad reception and needs to speak to the nurse. Call fely    Can the clinic reply by [facility]?    Would the patient rather a call back or a response via [facility]? call    Best call back number:[phone] (home) [phone] (work)      Additional Information:

## 2022-08-09 LAB — HCV AB SERPL QL IA: NEGATIVE

## 2022-08-11 ENCOUNTER — TELEPHONE (OUTPATIENT)
Dept: INTERNAL MEDICINE | Facility: CLINIC | Age: 57
End: 2022-08-11
Payer: MEDICARE

## 2022-08-12 ENCOUNTER — TELEPHONE (OUTPATIENT)
Dept: INTERNAL MEDICINE | Facility: CLINIC | Age: 57
End: 2022-08-12

## 2022-08-12 ENCOUNTER — CLINICAL SUPPORT (OUTPATIENT)
Dept: INTERNAL MEDICINE | Facility: CLINIC | Age: 57
End: 2022-08-12
Payer: MEDICARE

## 2022-08-12 ENCOUNTER — HOSPITAL ENCOUNTER (OUTPATIENT)
Dept: RADIOLOGY | Facility: OTHER | Age: 57
Discharge: HOME | End: 2022-08-12
Attending: INTERNAL MEDICINE
Payer: MEDICARE

## 2022-08-12 VITALS — OXYGEN SATURATION: 99 % | SYSTOLIC BLOOD PRESSURE: 120 MMHG | HEART RATE: 93 BPM | DIASTOLIC BLOOD PRESSURE: 86 MMHG

## 2022-08-12 DIAGNOSIS — N28.9 KIDNEY LESION: ICD-10-CM

## 2022-08-12 PROCEDURE — 76770 US EXAM ABDO BACK WALL COMP: CPT | Mod: TC

## 2022-08-12 PROCEDURE — 76770 US EXAM ABDO BACK WALL COMP: CPT | Mod: 26,,, | Performed by: RADIOLOGY

## 2022-08-12 PROCEDURE — 99999 PR PBB SHADOW E&M-EST. PATIENT-LVL II: CPT | Mod: PBBFAC,,,

## 2022-08-12 PROCEDURE — 99999 PR PBB SHADOW E&M-EST. PATIENT-LVL II: ICD-10-PCS | Mod: PBBFAC,,,

## 2022-08-12 PROCEDURE — 76770 US RETROPERITONEAL COMPLETE: ICD-10-PCS | Mod: 26,,, | Performed by: RADIOLOGY

## 2022-11-18 ENCOUNTER — TELEPHONE (OUTPATIENT)
Dept: INTERNAL MEDICINE | Facility: CLINIC | Age: 57
End: 2022-11-18
Payer: MEDICARE

## 2022-11-18 NOTE — TELEPHONE ENCOUNTER
----- Message from Crystal Vogel sent at 11/18/2022 10:51 AM CST -----  Regarding: running late  Name of Who is Calling: Stevenson           What is the request in detail: Patient is requesting a call back. [place] stated they'll make it in 25 minutes.            Can the clinic reply by [facility]: No           What Number to Call Back if not in [facility]: [phone]

## 2022-11-18 NOTE — TELEPHONE ENCOUNTER
Pt arrived over 15 minutes late. Asked Dr. Angel if willing to see pt but she advised he would have to reschedule. Notified pt upon arrival he could not be seen. Searched for appt's throughout all local locations with no luck of find an open appt to offer. Advised pt go to urgent care for evaluation.

## 2023-02-07 DIAGNOSIS — Z00.00 ENCOUNTER FOR MEDICARE ANNUAL WELLNESS EXAM: ICD-10-CM

## 2023-02-09 DIAGNOSIS — Z00.00 ENCOUNTER FOR MEDICARE ANNUAL WELLNESS EXAM: ICD-10-CM

## 2023-03-08 ENCOUNTER — TELEPHONE (OUTPATIENT)
Dept: INTERNAL MEDICINE | Facility: CLINIC | Age: 58
End: 2023-03-08
Payer: MEDICARE

## 2023-03-08 DIAGNOSIS — R82.998 DARK URINE: Primary | ICD-10-CM

## 2023-03-08 NOTE — TELEPHONE ENCOUNTER
----- Message from Krystyna Stevenson sent at 3/8/2023 10:55 AM CST -----  Regarding: concerns  Name of caller: Shellie ( ezio')       What is the requesting detail: shellie is calling to schedule an appt for a nephrologist but needs a referral and needs orders for blood work. Please call her back to let her know what her options are.     Can the clinic reply by [facility]: yes       What number to call back:[phone]

## 2023-03-08 NOTE — TELEPHONE ENCOUNTER
Any labs needed as pt has an appt with you on 3/17/2023 for medication review.    Reason for nephro referral:  You referred pt for testing with Dr. Mckeon.   No test were performed by Dr. Mckeon. Pt wife would like a new neprhro provider recommendation    Pt has dark urine w/ slight odor.  Lower back pain and mass is on lower left side of the back as well. Pain radiates to Ab (pressure) and hip  (pain).

## 2023-03-08 NOTE — TELEPHONE ENCOUNTER
Dr. Rodríguez was informed verbally and to ask if pt presents with:  Severe pain concerning for kidney stones?  Fever?  Chills?  Burning urination?  Frequent urination?  Hematuria?    If non urgent symptoms, Dr. Parham will further discuss at next week's visit    Rerouting for 2nd attempt

## 2023-03-08 NOTE — TELEPHONE ENCOUNTER
stating message below:  Dr. Rodríguez was informed verbally and to ask if pt presents with:  Severe pain concerning for kidney stones? No, but stabbing pain when he bends over.   Fever? no  Chills? no  Burning urination? No but did so a week ago  Frequent urination? yes  Hematuria? no      If non urgent symptoms, Dr. Parham will further discuss at next week's visit

## 2023-03-17 ENCOUNTER — OFFICE VISIT (OUTPATIENT)
Dept: INTERNAL MEDICINE | Facility: CLINIC | Age: 58
End: 2023-03-17
Attending: INTERNAL MEDICINE
Payer: MEDICARE

## 2023-03-17 ENCOUNTER — TELEPHONE (OUTPATIENT)
Dept: INTERNAL MEDICINE | Facility: CLINIC | Age: 58
End: 2023-03-17
Payer: MEDICARE

## 2023-03-17 ENCOUNTER — LAB VISIT (OUTPATIENT)
Dept: LAB | Facility: OTHER | Age: 58
End: 2023-03-17
Attending: INTERNAL MEDICINE
Payer: MEDICARE

## 2023-03-17 VITALS
WEIGHT: 209.88 LBS | SYSTOLIC BLOOD PRESSURE: 132 MMHG | BODY MASS INDEX: 30.05 KG/M2 | HEART RATE: 88 BPM | HEIGHT: 70 IN | OXYGEN SATURATION: 98 % | DIASTOLIC BLOOD PRESSURE: 80 MMHG

## 2023-03-17 DIAGNOSIS — R82.998 DARK URINE: ICD-10-CM

## 2023-03-17 DIAGNOSIS — R79.9 ABNORMAL FINDING OF BLOOD CHEMISTRY, UNSPECIFIED: ICD-10-CM

## 2023-03-17 DIAGNOSIS — I10 BENIGN ESSENTIAL HYPERTENSION: ICD-10-CM

## 2023-03-17 DIAGNOSIS — M54.16 LUMBAR BACK PAIN WITH RADICULOPATHY AFFECTING RIGHT LOWER EXTREMITY: ICD-10-CM

## 2023-03-17 DIAGNOSIS — Z12.11 COLON CANCER SCREENING: Primary | ICD-10-CM

## 2023-03-17 DIAGNOSIS — N28.1 RENAL CYST: ICD-10-CM

## 2023-03-17 DIAGNOSIS — J45.30 MILD PERSISTENT ASTHMA WITHOUT COMPLICATION: ICD-10-CM

## 2023-03-17 LAB
ALBUMIN SERPL BCP-MCNC: 4.2 G/DL (ref 3.5–5.2)
ALP SERPL-CCNC: 60 U/L (ref 55–135)
ALT SERPL W/O P-5'-P-CCNC: 17 U/L (ref 10–44)
ANION GAP SERPL CALC-SCNC: 8 MMOL/L (ref 8–16)
AST SERPL-CCNC: 21 U/L (ref 10–40)
BASOPHILS # BLD AUTO: 0.04 K/UL (ref 0–0.2)
BASOPHILS NFR BLD: 0.6 % (ref 0–1.9)
BILIRUB SERPL-MCNC: 0.9 MG/DL (ref 0.1–1)
BUN SERPL-MCNC: 14 MG/DL (ref 6–20)
CALCIUM SERPL-MCNC: 9.6 MG/DL (ref 8.7–10.5)
CHLORIDE SERPL-SCNC: 102 MMOL/L (ref 95–110)
CO2 SERPL-SCNC: 29 MMOL/L (ref 23–29)
CREAT SERPL-MCNC: 0.9 MG/DL (ref 0.5–1.4)
DIFFERENTIAL METHOD: ABNORMAL
EOSINOPHIL # BLD AUTO: 0.1 K/UL (ref 0–0.5)
EOSINOPHIL NFR BLD: 1.4 % (ref 0–8)
ERYTHROCYTE [DISTWIDTH] IN BLOOD BY AUTOMATED COUNT: 12.2 % (ref 11.5–14.5)
EST. GFR  (NO RACE VARIABLE): >60 ML/MIN/1.73 M^2
ESTIMATED AVG GLUCOSE: 108 MG/DL (ref 68–131)
GLUCOSE SERPL-MCNC: 92 MG/DL (ref 70–110)
HBA1C MFR BLD: 5.4 % (ref 4–5.6)
HCT VFR BLD AUTO: 47.3 % (ref 40–54)
HGB BLD-MCNC: 15.9 G/DL (ref 14–18)
IMM GRANULOCYTES # BLD AUTO: 0.01 K/UL (ref 0–0.04)
IMM GRANULOCYTES NFR BLD AUTO: 0.2 % (ref 0–0.5)
LYMPHOCYTES # BLD AUTO: 2.2 K/UL (ref 1–4.8)
LYMPHOCYTES NFR BLD: 34.3 % (ref 18–48)
MCH RBC QN AUTO: 31.4 PG (ref 27–31)
MCHC RBC AUTO-ENTMCNC: 33.6 G/DL (ref 32–36)
MCV RBC AUTO: 93 FL (ref 82–98)
MONOCYTES # BLD AUTO: 0.7 K/UL (ref 0.3–1)
MONOCYTES NFR BLD: 10.1 % (ref 4–15)
NEUTROPHILS # BLD AUTO: 3.4 K/UL (ref 1.8–7.7)
NEUTROPHILS NFR BLD: 53.4 % (ref 38–73)
NRBC BLD-RTO: 0 /100 WBC
PLATELET # BLD AUTO: 267 K/UL (ref 150–450)
PMV BLD AUTO: 9.9 FL (ref 9.2–12.9)
POTASSIUM SERPL-SCNC: 3.9 MMOL/L (ref 3.5–5.1)
PROT SERPL-MCNC: 7.6 G/DL (ref 6–8.4)
RBC # BLD AUTO: 5.07 M/UL (ref 4.6–6.2)
SODIUM SERPL-SCNC: 139 MMOL/L (ref 136–145)
TSH SERPL DL<=0.005 MIU/L-ACNC: 0.59 UIU/ML (ref 0.4–4)
WBC # BLD AUTO: 6.41 K/UL (ref 3.9–12.7)

## 2023-03-17 PROCEDURE — 3008F BODY MASS INDEX DOCD: CPT | Mod: HCNC,CPTII,S$GLB, | Performed by: INTERNAL MEDICINE

## 2023-03-17 PROCEDURE — 84443 ASSAY THYROID STIM HORMONE: CPT | Mod: HCNC | Performed by: INTERNAL MEDICINE

## 2023-03-17 PROCEDURE — 1160F RVW MEDS BY RX/DR IN RCRD: CPT | Mod: HCNC,CPTII,S$GLB, | Performed by: INTERNAL MEDICINE

## 2023-03-17 PROCEDURE — 3075F SYST BP GE 130 - 139MM HG: CPT | Mod: HCNC,CPTII,S$GLB, | Performed by: INTERNAL MEDICINE

## 2023-03-17 PROCEDURE — 1159F MED LIST DOCD IN RCRD: CPT | Mod: HCNC,CPTII,S$GLB, | Performed by: INTERNAL MEDICINE

## 2023-03-17 PROCEDURE — 1160F PR REVIEW ALL MEDS BY PRESCRIBER/CLIN PHARMACIST DOCUMENTED: ICD-10-PCS | Mod: HCNC,CPTII,S$GLB, | Performed by: INTERNAL MEDICINE

## 2023-03-17 PROCEDURE — 3075F PR MOST RECENT SYSTOLIC BLOOD PRESS GE 130-139MM HG: ICD-10-PCS | Mod: HCNC,CPTII,S$GLB, | Performed by: INTERNAL MEDICINE

## 2023-03-17 PROCEDURE — 99214 OFFICE O/P EST MOD 30 MIN: CPT | Mod: HCNC,S$GLB,, | Performed by: INTERNAL MEDICINE

## 2023-03-17 PROCEDURE — 1159F PR MEDICATION LIST DOCUMENTED IN MEDICAL RECORD: ICD-10-PCS | Mod: HCNC,CPTII,S$GLB, | Performed by: INTERNAL MEDICINE

## 2023-03-17 PROCEDURE — 36415 COLL VENOUS BLD VENIPUNCTURE: CPT | Mod: HCNC | Performed by: INTERNAL MEDICINE

## 2023-03-17 PROCEDURE — 80053 COMPREHEN METABOLIC PANEL: CPT | Mod: HCNC | Performed by: INTERNAL MEDICINE

## 2023-03-17 PROCEDURE — 85025 COMPLETE CBC W/AUTO DIFF WBC: CPT | Mod: HCNC | Performed by: INTERNAL MEDICINE

## 2023-03-17 PROCEDURE — 3079F PR MOST RECENT DIASTOLIC BLOOD PRESSURE 80-89 MM HG: ICD-10-PCS | Mod: HCNC,CPTII,S$GLB, | Performed by: INTERNAL MEDICINE

## 2023-03-17 PROCEDURE — 3079F DIAST BP 80-89 MM HG: CPT | Mod: HCNC,CPTII,S$GLB, | Performed by: INTERNAL MEDICINE

## 2023-03-17 PROCEDURE — 99999 PR PBB SHADOW E&M-EST. PATIENT-LVL V: CPT | Mod: PBBFAC,HCNC,, | Performed by: INTERNAL MEDICINE

## 2023-03-17 PROCEDURE — 3008F PR BODY MASS INDEX (BMI) DOCUMENTED: ICD-10-PCS | Mod: HCNC,CPTII,S$GLB, | Performed by: INTERNAL MEDICINE

## 2023-03-17 PROCEDURE — 99214 PR OFFICE/OUTPT VISIT, EST, LEVL IV, 30-39 MIN: ICD-10-PCS | Mod: HCNC,S$GLB,, | Performed by: INTERNAL MEDICINE

## 2023-03-17 PROCEDURE — 99999 PR PBB SHADOW E&M-EST. PATIENT-LVL V: ICD-10-PCS | Mod: PBBFAC,HCNC,, | Performed by: INTERNAL MEDICINE

## 2023-03-17 PROCEDURE — 83036 HEMOGLOBIN GLYCOSYLATED A1C: CPT | Mod: HCNC | Performed by: INTERNAL MEDICINE

## 2023-03-17 RX ORDER — CYCLOBENZAPRINE HCL 10 MG
TABLET ORAL
COMMUNITY
Start: 2023-02-24

## 2023-03-17 RX ORDER — AZITHROMYCIN 250 MG/1
TABLET, FILM COATED ORAL
COMMUNITY
Start: 2022-11-18 | End: 2023-07-18

## 2023-03-17 RX ORDER — TRIAMCINOLONE ACETONIDE 1 MG/G
CREAM TOPICAL 2 TIMES DAILY
Qty: 45 G | Refills: 1 | Status: SHIPPED | OUTPATIENT
Start: 2023-03-17 | End: 2024-03-26

## 2023-03-17 RX ORDER — GABAPENTIN 100 MG/1
CAPSULE ORAL
COMMUNITY
Start: 2023-02-24 | End: 2024-03-26

## 2023-03-17 NOTE — PROGRESS NOTES
"Subjective:       Patient ID: Stevenson Robison Jr. is a 58 y.o. male.    Chief Complaint: medication review    Here for urgent visit    Urine was dark one morning. Had MRI for back UTD 2 weeks prior and renal cyst was again shown. Per pt, large!!    Back continues to hurt. He continues to worry about cancer. He is due for colonoscopy. He is UTD with PSA. He has lost several family members and friends this past year.      Review of Systems   Constitutional:  Negative for appetite change, chills, fever and unexpected weight change.   HENT:  Negative for hearing loss, sore throat and trouble swallowing.    Eyes:  Negative for visual disturbance.   Respiratory:  Negative for cough, chest tightness and shortness of breath.    Cardiovascular:  Negative for chest pain and leg swelling.   Gastrointestinal:  Negative for abdominal pain, blood in stool, constipation, diarrhea, nausea and vomiting.   Endocrine: Negative for polydipsia and polyuria.   Genitourinary:  Negative for decreased urine volume, difficulty urinating, dysuria, frequency and urgency.   Musculoskeletal:  Positive for arthralgias. Negative for gait problem.   Skin:  Negative for rash.   Neurological:  Negative for dizziness and numbness.   Psychiatric/Behavioral:  The patient is not nervous/anxious.      Objective:      Vitals:    03/17/23 1357 03/17/23 1445   BP: (!) 140/90 132/80   Pulse: 88    SpO2: 98%    Weight: 95.2 kg (209 lb 14.1 oz)    Height: 5' 10" (1.778 m)       Physical Exam  Vitals and nursing note reviewed.   Constitutional:       General: He is not in acute distress.     Appearance: Normal appearance. He is well-developed.   HENT:      Head: Normocephalic and atraumatic.      Mouth/Throat:      Pharynx: No oropharyngeal exudate.   Eyes:      General: No scleral icterus.     Conjunctiva/sclera: Conjunctivae normal.      Pupils: Pupils are equal, round, and reactive to light.   Neck:      Thyroid: No thyromegaly.   Cardiovascular:      Rate and " Rhythm: Normal rate and regular rhythm.      Heart sounds: Normal heart sounds. No murmur heard.  Pulmonary:      Effort: Pulmonary effort is normal.      Breath sounds: Normal breath sounds. No wheezing or rales.   Abdominal:      General: There is no distension.   Musculoskeletal:         General: No tenderness.   Lymphadenopathy:      Cervical: No cervical adenopathy.   Skin:     General: Skin is warm and dry.   Neurological:      Mental Status: He is alert and oriented to person, place, and time.   Psychiatric:         Behavior: Behavior normal.       Assessment:       1. Colon cancer screening    2. Dark urine    3. Benign essential hypertension    4. Mild persistent asthma without complication    5. Lumbar back pain with radiculopathy affecting bilateral lower extremity    6. Renal cyst    7. Abnormal finding of blood chemistry, unspecified          Plan:       Stevenson was seen today for medication review.    Diagnoses and all orders for this visit:    Colon cancer screening  -     Ambulatory referral/consult to Endo Procedure ; Future    Dark urine  -     Comprehensive Metabolic Panel; Future  -     CBC Auto Differential; Future  -     Hemoglobin A1C; Future  -     Urinalysis, Reflex to Urine Culture Urine, Clean Catch; Future    Benign essential hypertension  -     Comprehensive Metabolic Panel; Future  -     CBC Auto Differential; Future  -     Comprehensive Metabolic Panel; Future  -     CBC Auto Differential; Future  -     TSH; Future    Mild persistent asthma without complication    Lumbar back pain with radiculopathy affecting bilateral lower extremity  -     Comprehensive Metabolic Panel; Future  -     CBC Auto Differential; Future    Renal cyst  -     US Retroperitoneal Complete (Kidney and; Future    Abnormal finding of blood chemistry, unspecified  -     Hemoglobin A1C; Future    Other orders  -     triamcinolone acetonide 0.1% (KENALOG) 0.1 % cream; Apply topically 2 (two) times daily. for  10 days           Ty Whatley MD  Internal Medicine-[facility]        Side effects of medication(s) were discussed in detail and patient voiced understanding.  Patient will call back for any issues or complications.

## 2023-03-17 NOTE — TELEPHONE ENCOUNTER
Spoke with pt friend stating message below:    Dr. Whatley's schedule today allows him to offer you to come in early if you would like. He is in no way asking you to come in early, especially if your schedule does not allow. We just wanted to let you know you are welcome to come in anytime between now and your scheduled time if this works out better for you. We will see you when you get here. If you are unable to come to today's appointment please notify us as soon as possible.     May come in early or stick with scheduled appt time if weather subsides

## 2023-03-24 ENCOUNTER — HOSPITAL ENCOUNTER (OUTPATIENT)
Dept: RADIOLOGY | Facility: OTHER | Age: 58
Discharge: HOME | End: 2023-03-24
Attending: INTERNAL MEDICINE
Payer: MEDICARE

## 2023-03-24 DIAGNOSIS — N28.1 RENAL CYST: ICD-10-CM

## 2023-03-24 PROCEDURE — 76770 US RETROPERITONEAL COMPLETE: ICD-10-PCS | Mod: 26,HCNC,, | Performed by: RADIOLOGY

## 2023-03-24 PROCEDURE — 76770 US EXAM ABDO BACK WALL COMP: CPT | Mod: 26,HCNC,, | Performed by: RADIOLOGY

## 2023-03-24 PROCEDURE — 76770 US EXAM ABDO BACK WALL COMP: CPT | Mod: TC,HCNC

## 2023-03-27 NOTE — PROGRESS NOTES
Your kidneys are fine. The imaging of your kidneys is consistent with a simple, benign renal cyst. This poses no threat so we can ignore it.

## 2023-04-04 ENCOUNTER — OFFICE VISIT (OUTPATIENT)
Dept: OPTOMETRY | Facility: CLINIC | Age: 58
End: 2023-04-04
Payer: MEDICARE

## 2023-04-04 DIAGNOSIS — H52.203 HYPEROPIA WITH ASTIGMATISM AND PRESBYOPIA, BILATERAL: ICD-10-CM

## 2023-04-04 DIAGNOSIS — H25.813 COMBINED FORM OF AGE-RELATED CATARACT, BOTH EYES: Primary | ICD-10-CM

## 2023-04-04 DIAGNOSIS — H18.413 ARCUS SENILIS OF BOTH CORNEAS: ICD-10-CM

## 2023-04-04 DIAGNOSIS — H52.03 HYPEROPIA WITH ASTIGMATISM AND PRESBYOPIA, BILATERAL: ICD-10-CM

## 2023-04-04 DIAGNOSIS — H53.8 BLURRY VISION, BILATERAL: ICD-10-CM

## 2023-04-04 DIAGNOSIS — H51.11 CI (CONVERGENCE INSUFFICIENCY): ICD-10-CM

## 2023-04-04 DIAGNOSIS — H52.4 HYPEROPIA WITH ASTIGMATISM AND PRESBYOPIA, BILATERAL: ICD-10-CM

## 2023-04-04 PROCEDURE — 92015 DETERMINE REFRACTIVE STATE: CPT | Mod: HCNC,S$GLB,, | Performed by: OPTOMETRIST

## 2023-04-04 PROCEDURE — 99214 PR OFFICE/OUTPT VISIT, EST, LEVL IV, 30-39 MIN: ICD-10-PCS | Mod: HCNC,S$GLB,, | Performed by: OPTOMETRIST

## 2023-04-04 PROCEDURE — 99214 OFFICE O/P EST MOD 30 MIN: CPT | Mod: HCNC,S$GLB,, | Performed by: OPTOMETRIST

## 2023-04-04 PROCEDURE — 3044F PR MOST RECENT HEMOGLOBIN A1C LEVEL <7.0%: ICD-10-PCS | Mod: HCNC,CPTII,S$GLB, | Performed by: OPTOMETRIST

## 2023-04-04 PROCEDURE — 3044F HG A1C LEVEL LT 7.0%: CPT | Mod: HCNC,CPTII,S$GLB, | Performed by: OPTOMETRIST

## 2023-04-04 PROCEDURE — 1159F MED LIST DOCD IN RCRD: CPT | Mod: HCNC,CPTII,S$GLB, | Performed by: OPTOMETRIST

## 2023-04-04 PROCEDURE — 99999 PR PBB SHADOW E&M-EST. PATIENT-LVL I: CPT | Mod: PBBFAC,HCNC,, | Performed by: OPTOMETRIST

## 2023-04-04 PROCEDURE — 1159F PR MEDICATION LIST DOCUMENTED IN MEDICAL RECORD: ICD-10-PCS | Mod: HCNC,CPTII,S$GLB, | Performed by: OPTOMETRIST

## 2023-04-04 PROCEDURE — 99999 PR PBB SHADOW E&M-EST. PATIENT-LVL I: ICD-10-PCS | Mod: PBBFAC,HCNC,, | Performed by: OPTOMETRIST

## 2023-04-04 PROCEDURE — 92015 PR REFRACTION: ICD-10-PCS | Mod: HCNC,S$GLB,, | Performed by: OPTOMETRIST

## 2023-04-04 NOTE — PROGRESS NOTES
KG ROBLES-last year   Pt is a 58 y.o male here today for continued care states he never filled   glasses only readers   Concerned that he arcus senilis  Glasses or contacts?none     Sx hx-none   Family hx-none  Gtts-none   Last edited by Darshana Chaudhry on 4/4/2023  1:30 PM.            Assessment /Plan     For exam results, see Encounter Report.    Combined form of age-related cataract, both eyes    Arcus senilis of both corneas    Blurry vision, bilateral    CI (convergence insufficiency)    Hyperopia with astigmatism and presbyopia, bilateral      MONITOR. ED PT ON ALL EXAM FINDINGS  LONGSTANDING H/O CI. Updated and dispensed SV spectacle prescriptions for near and distance. Prism included in near specs due to CI symptoms and patient preferenc  MILD CATS OU; PRESURGICAL; MONITOR. UV PROTECTION  MILD ARCUS OS>OD; BENIGN; DISCUSSED; UV PROTECTION  RTC 1 YR//PRN FOR REE/DFE

## 2023-04-24 ENCOUNTER — PES CALL (OUTPATIENT)
Dept: ADMINISTRATIVE | Facility: CLINIC | Age: 58
End: 2023-04-24
Payer: MEDICARE

## 2023-05-26 DIAGNOSIS — I10 BENIGN ESSENTIAL HYPERTENSION: Primary | ICD-10-CM

## 2023-05-26 RX ORDER — AMLODIPINE BESYLATE 5 MG/1
5 TABLET ORAL DAILY
Qty: 90 TABLET | Refills: 3 | Status: SHIPPED | OUTPATIENT
Start: 2023-05-26 | End: 2023-06-19 | Stop reason: SDUPTHER

## 2023-05-26 NOTE — TELEPHONE ENCOUNTER
Provider Staff:  Action required for this patient     Please see care gap opportunities below in Care Due Message: Lipid panel due 8/1/23    Thanks!  [facility]     Appointments      Date Provider   Last Visit   3/17/2023 Ty Rodríguez MD   Next Visit   9/18/2023 Ty Rodríguez MD     Refill Decision Note   Stevenson Robison  is requesting a refill authorization.  Brief Assessment and Rationale for Refill:  Approve     Medication Therapy Plan:         Comments:     Note composed:5:22 PM 05/26/2023

## 2023-05-26 NOTE — TELEPHONE ENCOUNTER
----- Message from Kay Hernandez sent at 5/25/2023  4:01 PM CDT -----  Can the clinic reply in [facility]:              Please refill the medication(s) listed below. Please call the patient when the prescription(s) is ready for  at this phone [phone]              Medication #1amLODIPine (NORVASC) 5 MG tablet            Medication #2              Preferred Pharmacy:[facility] #[id number] - [place] [id number] [address]

## 2023-05-26 NOTE — TELEPHONE ENCOUNTER
Care Due:                  Date            Visit Type   Department     Provider  --------------------------------------------------------------------------------                                EP -                              PRIMARY      [facility] INTERNAL  Last Visit: 03-      CARE ([facility])   MEDICINE       Ty Rodríguez                              EP -                              PRIMARY      [facility] INTERNAL  Next Visit: 09-      CARE ([facility])   MEDICINE       Ty Rodríguez                                                            Last  Test          Frequency    Reason                     Performed    Due Date  --------------------------------------------------------------------------------    Lipid Panel.  12 months..  atorvastatin.............  08- 08-    Health [facility] Embedded Care Due Messages. Reference number: [id number].   5/26/2023 11:45:17 AM CDT

## 2023-06-09 ENCOUNTER — PES CALL (OUTPATIENT)
Dept: ADMINISTRATIVE | Facility: CLINIC | Age: 58
End: 2023-06-09
Payer: MEDICARE

## 2023-06-19 ENCOUNTER — OFFICE VISIT (OUTPATIENT)
Dept: INTERNAL MEDICINE | Facility: CLINIC | Age: 58
End: 2023-06-19
Attending: FAMILY MEDICINE
Payer: MEDICARE

## 2023-06-19 VITALS
BODY MASS INDEX: 33.35 KG/M2 | DIASTOLIC BLOOD PRESSURE: 88 MMHG | WEIGHT: 232.94 LBS | OXYGEN SATURATION: 96 % | HEIGHT: 70 IN | HEART RATE: 72 BPM | SYSTOLIC BLOOD PRESSURE: 134 MMHG

## 2023-06-19 DIAGNOSIS — I10 BENIGN ESSENTIAL HYPERTENSION: ICD-10-CM

## 2023-06-19 DIAGNOSIS — R06.83 SNORING: Primary | ICD-10-CM

## 2023-06-19 DIAGNOSIS — J45.30 MILD PERSISTENT ASTHMA WITHOUT COMPLICATION: ICD-10-CM

## 2023-06-19 PROCEDURE — 3075F SYST BP GE 130 - 139MM HG: CPT | Mod: HCNC,CPTII,S$GLB, | Performed by: FAMILY MEDICINE

## 2023-06-19 PROCEDURE — 3008F PR BODY MASS INDEX (BMI) DOCUMENTED: ICD-10-PCS | Mod: HCNC,CPTII,S$GLB, | Performed by: FAMILY MEDICINE

## 2023-06-19 PROCEDURE — 3075F PR MOST RECENT SYSTOLIC BLOOD PRESS GE 130-139MM HG: ICD-10-PCS | Mod: HCNC,CPTII,S$GLB, | Performed by: FAMILY MEDICINE

## 2023-06-19 PROCEDURE — 99999 PR PBB SHADOW E&M-EST. PATIENT-LVL IV: CPT | Mod: PBBFAC,,, | Performed by: FAMILY MEDICINE

## 2023-06-19 PROCEDURE — 99214 OFFICE O/P EST MOD 30 MIN: CPT | Mod: HCNC,S$GLB,, | Performed by: FAMILY MEDICINE

## 2023-06-19 PROCEDURE — 99214 PR OFFICE/OUTPT VISIT, EST, LEVL IV, 30-39 MIN: ICD-10-PCS | Mod: HCNC,S$GLB,, | Performed by: FAMILY MEDICINE

## 2023-06-19 PROCEDURE — 3008F BODY MASS INDEX DOCD: CPT | Mod: HCNC,CPTII,S$GLB, | Performed by: FAMILY MEDICINE

## 2023-06-19 PROCEDURE — 1160F RVW MEDS BY RX/DR IN RCRD: CPT | Mod: HCNC,CPTII,S$GLB, | Performed by: FAMILY MEDICINE

## 2023-06-19 PROCEDURE — 1159F MED LIST DOCD IN RCRD: CPT | Mod: HCNC,CPTII,S$GLB, | Performed by: FAMILY MEDICINE

## 2023-06-19 PROCEDURE — 3079F DIAST BP 80-89 MM HG: CPT | Mod: HCNC,CPTII,S$GLB, | Performed by: FAMILY MEDICINE

## 2023-06-19 PROCEDURE — 3044F HG A1C LEVEL LT 7.0%: CPT | Mod: HCNC,CPTII,S$GLB, | Performed by: FAMILY MEDICINE

## 2023-06-19 PROCEDURE — 1159F PR MEDICATION LIST DOCUMENTED IN MEDICAL RECORD: ICD-10-PCS | Mod: HCNC,CPTII,S$GLB, | Performed by: FAMILY MEDICINE

## 2023-06-19 PROCEDURE — 99999 PR PBB SHADOW E&M-EST. PATIENT-LVL IV: ICD-10-PCS | Mod: PBBFAC,,, | Performed by: FAMILY MEDICINE

## 2023-06-19 PROCEDURE — 1160F PR REVIEW ALL MEDS BY PRESCRIBER/CLIN PHARMACIST DOCUMENTED: ICD-10-PCS | Mod: HCNC,CPTII,S$GLB, | Performed by: FAMILY MEDICINE

## 2023-06-19 PROCEDURE — 3044F PR MOST RECENT HEMOGLOBIN A1C LEVEL <7.0%: ICD-10-PCS | Mod: HCNC,CPTII,S$GLB, | Performed by: FAMILY MEDICINE

## 2023-06-19 PROCEDURE — 3079F PR MOST RECENT DIASTOLIC BLOOD PRESSURE 80-89 MM HG: ICD-10-PCS | Mod: HCNC,CPTII,S$GLB, | Performed by: FAMILY MEDICINE

## 2023-06-19 RX ORDER — AMLODIPINE BESYLATE 5 MG/1
5 TABLET ORAL DAILY
Qty: 90 TABLET | Refills: 3 | Status: SHIPPED | OUTPATIENT
Start: 2023-06-19 | End: 2023-08-17 | Stop reason: SDUPTHER

## 2023-06-19 RX ORDER — ALBUTEROL SULFATE 90 UG/1
2 AEROSOL, METERED RESPIRATORY (INHALATION) EVERY 6 HOURS PRN
Qty: 8 G | Refills: 3 | Status: SHIPPED | OUTPATIENT
Start: 2023-06-19 | End: 2023-08-14

## 2023-06-19 NOTE — PROGRESS NOTES
"CHIEF COMPLAINT: First am hemoptysis for about a week    HISTORY OF PRESENT ILLNESS: The patient is a 58 year-old WM.  The patient has First am hemoptysis for about a week.  No further hemoptysis during the day or evening.  Also has been waking up with a + ST.  No fever/chills.  Feels d/t snoring as a result of YORDAN.    The patient has a history of stable hypertension on current medications.  Patient denies chest pain or shortness of breath today.    REVIEW OF SYSTEMS:  GENERAL: No fever, chills, fatigability or weight loss.  SKIN: No rashes, itching or changes in color or texture of skin.  HEAD: No headaches or recent head trauma.  EYES: Visual acuity fine. No photophobia, ocular pain or diplopia.  EARS: Denies ear pain, discharge or vertigo.  NOSE: No loss of smell, no epistaxis or postnasal drip.  MOUTH & THROAT: No hoarseness or change in voice. No excessive gum bleeding.  NODES: Denies swollen glands.  CHEST: Denies ROBERTS, cyanosis, wheezing, cough and sputum production.  CARDIOVASCULAR: Denies chest pain, PND, orthopnea or reduced exercise tolerance.  ABDOMEN: Appetite fine. No weight loss. Denies diarrhea, abdominal pain, hematemesis or blood in stool.  URINARY: No flank pain, dysuria or hematuria.  PERIPHERAL VASCULAR: No claudication or cyanosis.  MUSCULOSKELETAL: No joint stiffness or swelling. Denies back pain.  NEUROLOGIC: No history of seizures, paralysis, alteration of gait or coordination.    SOCIAL HISTORY: The patient does not smoke.  The patient is .    PHYSICAL EXAMINATION:   Blood pressure 134/88, pulse 72, height 5' 10" (1.778 m), weight 105.7 kg (232 lb 14.7 oz), SpO2 96 %.    APPEARANCE: Well nourished, well developed, in no acute distress.    HEAD: Normocephalic, atraumatic.  EYES: PERRL. EOMI.  Conjunctivae without injection and  anicteric  NOSE: Mucosa pink. Airway clear.  MOUTH & THROAT: No tonsillar enlargement. No pharyngeal erythema or exudate. No stridor.  Crowded airway " noted.  NECK: Supple.   NODES: No cervical, axillary or inguinal lymph node enlargement.  CHEST: Lungs clear to auscultation.  No retractions are noted.  No rales or rhonchi are present.  CARDIOVASCULAR: Normal S1, S2. No rubs, murmurs or gallops.  ABDOMEN: Bowel sounds normal. Not distended. Soft. No tenderness or masses.  No ascites is noted.  MUSCULOSKELETAL:  There is no clubbing, cyanosis, or edema of the extremities x4.  There is full range of motion of the lumbar spine.  There is full range of motion of the extremities x4.  There is no deformity noted.    NEUROLOGIC:       Normal speech development.      Hearing normal.      Normal gait.      Motor and sensory exams grossly normal.  PSYCHIATRIC: Patient is alert and oriented x3.  Thought processes are all normal.  There is no homicidality.  There is no suicidality.  There is no evidence of psychosis.    LABORATORY/RADIOLOGY:   Chart reviewed.      ASSESSMENT:   Hemoptysis possibly due to YORDAN  HTN    PLAN:  Sleep referral

## 2023-07-12 ENCOUNTER — TELEPHONE (OUTPATIENT)
Dept: INTERNAL MEDICINE | Facility: CLINIC | Age: 58
End: 2023-07-12
Payer: MEDICARE

## 2023-07-12 NOTE — TELEPHONE ENCOUNTER
----- Message from Yasmany Ha sent at 7/10/2023  2:52 PM CDT -----  Contact: Patient  Type:  Patient Call          Who Called: Patient wife         Does the patient know what this is regarding?: Requesting a call back about having the pt appt changed to 7/19 due to him having another appt on that day it would be easier ; please advise           Would the patient rather a call back or a response via [facility]?call           Best Call Back Number: [phone] (work)             Additional Information:

## 2023-07-18 ENCOUNTER — OFFICE VISIT (OUTPATIENT)
Dept: INTERNAL MEDICINE | Facility: CLINIC | Age: 58
End: 2023-07-18
Attending: INTERNAL MEDICINE
Payer: MEDICARE

## 2023-07-18 VITALS
HEART RATE: 59 BPM | OXYGEN SATURATION: 97 % | HEIGHT: 69 IN | DIASTOLIC BLOOD PRESSURE: 80 MMHG | BODY MASS INDEX: 34.45 KG/M2 | WEIGHT: 232.56 LBS | SYSTOLIC BLOOD PRESSURE: 130 MMHG

## 2023-07-18 DIAGNOSIS — M62.81 QUADRICEPS WEAKNESS: ICD-10-CM

## 2023-07-18 DIAGNOSIS — M25.662 DECREASED RANGE OF MOTION OF LEFT KNEE: ICD-10-CM

## 2023-07-18 DIAGNOSIS — I10 BENIGN ESSENTIAL HYPERTENSION: ICD-10-CM

## 2023-07-18 DIAGNOSIS — E78.2 MIXED HYPERLIPIDEMIA: ICD-10-CM

## 2023-07-18 DIAGNOSIS — Z00.00 ENCOUNTER FOR PREVENTIVE HEALTH EXAMINATION: ICD-10-CM

## 2023-07-18 DIAGNOSIS — Z00.00 ENCOUNTER FOR MEDICARE ANNUAL WELLNESS EXAM: ICD-10-CM

## 2023-07-18 DIAGNOSIS — M54.16 LUMBAR BACK PAIN WITH RADICULOPATHY AFFECTING RIGHT LOWER EXTREMITY: ICD-10-CM

## 2023-07-18 DIAGNOSIS — E66.01 SEVERE OBESITY (BMI 35.0-39.9) WITH COMORBIDITY: ICD-10-CM

## 2023-07-18 DIAGNOSIS — J45.30 MILD PERSISTENT ASTHMA WITHOUT COMPLICATION: Primary | ICD-10-CM

## 2023-07-18 DIAGNOSIS — K13.70 ORAL LESION: ICD-10-CM

## 2023-07-18 PROCEDURE — 99999 PR PBB SHADOW E&M-EST. PATIENT-LVL IV: CPT | Mod: PBBFAC,HCNC,, | Performed by: NURSE PRACTITIONER

## 2023-07-18 PROCEDURE — G9919 SCRN ND POS ND PROV OF REC: HCPCS | Mod: HCNC,CPTII,S$GLB, | Performed by: NURSE PRACTITIONER

## 2023-07-18 PROCEDURE — G0439 PPPS, SUBSEQ VISIT: HCPCS | Mod: HCNC,S$GLB,, | Performed by: NURSE PRACTITIONER

## 2023-07-18 PROCEDURE — 3008F PR BODY MASS INDEX (BMI) DOCUMENTED: ICD-10-PCS | Mod: HCNC,CPTII,S$GLB, | Performed by: NURSE PRACTITIONER

## 2023-07-18 PROCEDURE — 99999 PR PBB SHADOW E&M-EST. PATIENT-LVL IV: ICD-10-PCS | Mod: PBBFAC,HCNC,, | Performed by: NURSE PRACTITIONER

## 2023-07-18 PROCEDURE — 3075F SYST BP GE 130 - 139MM HG: CPT | Mod: HCNC,CPTII,S$GLB, | Performed by: NURSE PRACTITIONER

## 2023-07-18 PROCEDURE — 3079F DIAST BP 80-89 MM HG: CPT | Mod: HCNC,CPTII,S$GLB, | Performed by: NURSE PRACTITIONER

## 2023-07-18 PROCEDURE — 3044F PR MOST RECENT HEMOGLOBIN A1C LEVEL <7.0%: ICD-10-PCS | Mod: HCNC,CPTII,S$GLB, | Performed by: NURSE PRACTITIONER

## 2023-07-18 PROCEDURE — 3044F HG A1C LEVEL LT 7.0%: CPT | Mod: HCNC,CPTII,S$GLB, | Performed by: NURSE PRACTITIONER

## 2023-07-18 PROCEDURE — 1160F PR REVIEW ALL MEDS BY PRESCRIBER/CLIN PHARMACIST DOCUMENTED: ICD-10-PCS | Mod: HCNC,CPTII,S$GLB, | Performed by: NURSE PRACTITIONER

## 2023-07-18 PROCEDURE — G9919 PR SCREENING AND POSITIVE: ICD-10-PCS | Mod: HCNC,CPTII,S$GLB, | Performed by: NURSE PRACTITIONER

## 2023-07-18 PROCEDURE — G0439 PR MEDICARE ANNUAL WELLNESS SUBSEQUENT VISIT: ICD-10-PCS | Mod: HCNC,S$GLB,, | Performed by: NURSE PRACTITIONER

## 2023-07-18 PROCEDURE — 3075F PR MOST RECENT SYSTOLIC BLOOD PRESS GE 130-139MM HG: ICD-10-PCS | Mod: HCNC,CPTII,S$GLB, | Performed by: NURSE PRACTITIONER

## 2023-07-18 PROCEDURE — 3079F PR MOST RECENT DIASTOLIC BLOOD PRESSURE 80-89 MM HG: ICD-10-PCS | Mod: HCNC,CPTII,S$GLB, | Performed by: NURSE PRACTITIONER

## 2023-07-18 PROCEDURE — 1160F RVW MEDS BY RX/DR IN RCRD: CPT | Mod: HCNC,CPTII,S$GLB, | Performed by: NURSE PRACTITIONER

## 2023-07-18 PROCEDURE — 3008F BODY MASS INDEX DOCD: CPT | Mod: HCNC,CPTII,S$GLB, | Performed by: NURSE PRACTITIONER

## 2023-07-18 PROCEDURE — 1159F PR MEDICATION LIST DOCUMENTED IN MEDICAL RECORD: ICD-10-PCS | Mod: HCNC,CPTII,S$GLB, | Performed by: NURSE PRACTITIONER

## 2023-07-18 PROCEDURE — 1159F MED LIST DOCD IN RCRD: CPT | Mod: HCNC,CPTII,S$GLB, | Performed by: NURSE PRACTITIONER

## 2023-07-18 NOTE — PATIENT INSTRUCTIONS
Counseling and Referral of Other Preventative  (Italic type indicates deductible and co-insurance are waived)    Patient Name: Stevenson Robison  Today's Date: 7/18/2023    Health Maintenance       Date Due Completion Date    Pneumococcal Vaccines (Age 0-64) (1 - PCV) Never done ---    Shingles Vaccine (1 of 2) Never done ---    COVID-19 Vaccine (3 - Pfizer series) 10/15/2021 8/20/2021    Colorectal Cancer Screening 02/23/2022 2/23/2017    Influenza Vaccine (1) 09/01/2023 9/6/2019    Hemoglobin A1c (Diabetic Prevention Screening) 03/17/2026 3/17/2023    Lipid Panel 08/05/2027 8/5/2022    TETANUS VACCINE 10/18/2028 10/18/2018        No orders of the defined types were placed in this encounter.      The following information is provided to all patients.  This information is to help you find resources for any of the problems found today that may be affecting your health:                Living healthy guide: www.[facility].[place].[facility]      Understanding Diabetes: www.diabetes.org      Eating healthy: www.cdc.gov/healthyweight      [facility] home safety checklist: www.cdc.gov/steadi/patient.html      Agency on Aging: www.goea.[place].gov      Alcoholics anonymous (AA): www.aa.org      Physical Activity: www.giovany.nih.gov/uj1yotc      Tobacco use: www.quitwithusla.org

## 2023-07-18 NOTE — PROGRESS NOTES
"  Stevenson Robison presented for a  Medicare AWV and comprehensive Health Risk Assessment today. The following components were reviewed and updated:    Medical history  Family History  Social history  Allergies and Current Medications  Health Risk Assessment  Health Maintenance  Care Team     Patient screened moderate and/or high risk for one or more social determinants of health (SDOH). Patient connected to community resources through the ED Navigator.      ** See Completed Assessments for Annual Wellness Visit within the encounter summary.**         The following assessments were completed:  Living Situation  CAGE  Depression Screening  Timed Get Up and Go  Whisper Test  Cognitive Function Screening  Nutrition Screening  ADL Screening  PAQ Screening          Vitals:    07/18/23 1401   BP: 130/80   BP Location: Right arm   Patient Position: Sitting   Pulse: (!) 59   SpO2: 97%   Weight: 105.5 kg (232 lb 9.4 oz)   Height: 5' 9" (1.753 m)     Body mass index is 34.35 kg/m².    Physical Exam  Vitals reviewed.   Constitutional:       Appearance: He is well-developed. He is obese.   HENT:      Head: Normocephalic and atraumatic. Not macrocephalic and not microcephalic. No raccoon eyes, Evnces's sign, abrasion, contusion, right periorbital erythema, left periorbital erythema or laceration. Hair is normal.      Right Ear: No decreased hearing noted. No laceration, drainage, swelling or tenderness. No middle ear effusion. No foreign body. No mastoid tenderness. No hemotympanum. Tympanic membrane is not injected, scarred, perforated, erythematous, retracted or bulging. Tympanic membrane has normal mobility.      Left Ear: No decreased hearing noted. No laceration, drainage, swelling or tenderness.  No middle ear effusion. No foreign body. No mastoid tenderness. No hemotympanum. Tympanic membrane is not injected, scarred, perforated, erythematous, retracted or bulging. Tympanic membrane has normal mobility.      Nose: Nose " normal. No nasal deformity, laceration or mucosal edema.      Mouth/Throat:      Pharynx: Uvula midline.   Eyes:      General: Lids are normal. No scleral icterus.     Conjunctiva/sclera: Conjunctivae normal.   Neck:      Thyroid: No thyroid mass or thyromegaly.      Trachea: Trachea normal.   Cardiovascular:      Rate and Rhythm: Normal rate and regular rhythm.   Pulmonary:      Effort: Pulmonary effort is normal. No respiratory distress.      Breath sounds: Normal breath sounds.   Abdominal:      Palpations: Abdomen is soft.   Musculoskeletal:         General: Normal range of motion.      Cervical back: Neck supple. No edema or erythema. No spinous process tenderness or muscular tenderness. Normal range of motion.   Lymphadenopathy:      Head:      Right side of head: No submental, submandibular, tonsillar, preauricular or posterior auricular adenopathy.      Left side of head: No submental, submandibular, tonsillar, preauricular, posterior auricular or occipital adenopathy.   Skin:     General: Skin is warm and dry.   Neurological:      Mental Status: He is alert and oriented to person, place, and time.      Cranial Nerves: No cranial nerve deficit.      Sensory: No sensory deficit.   Psychiatric:         Behavior: Behavior normal.         Thought Content: Thought content normal.         Judgment: Judgment normal.           Diagnoses and health risks identified today and associated recommendations/orders:    1. Encounter for Medicare annual wellness exam  Annual Health Risk Assessment (HRA) visit today.  Counseling and referral of health maintenance and preventative health measures performed.  Patient given annual wellness paperwork to take home.  Encouraged to return in 1 year for subsequent HRA visit.   - Ambulatory Referral/Consult to Enhanced Annual Wellness Visit (eAWV)  -Patient on chronic opioids. Risk factors reviewed for any misuse and/or abuse. Pain assessed during visit. Patient being treated and  followed by Pain Medicine.    2. Mild persistent asthma without complication  Chronic. Stable. Continue current treatment plan as previously prescribed by PCP.    3. Oral lesion  Chronic. Stable. Continue current treatment plan as previously prescribed by PCP.    4. Lumbar back pain with radiculopathy affecting bilateral lower extremity  Chronic. Stable. Continue current treatment plan as previously prescribed by PCP.    5. Benign essential hypertension  Chronic. Stable. Controlled. Encouraged to increase exercise as tolerated (moderate-intensity aerobic activity and muscle-strengthening activities) improve diet to heart healthy, low sodium diet.  Continue current treatment plan as previously prescribed by PCP.    6. Mixed hyperlipidemia  Chronic. Stable. Continue current treatment plan as previously prescribed by PCP.    7. Severe obesity (BMI 35.0-39.9) with comorbidity  Chronic. Stable. Encouraged to increase exercise as tolerated and improve diet to heart healthy, low sodium diet. Continue current treatment plan as previously prescribed by PCP.    8. Quadriceps weakness  Chronic. Stable. Continue current treatment plan as previously prescribed by PCP.    9. Decreased range of motion of left knee  Chronic. Stable. Continue current treatment plan as previously prescribed by PCP.        Provided Stevenson with a 5-10 year written screening schedule and personal prevention plan. Recommendations were developed using the USPSTF age appropriate recommendations. Education, counseling, and referrals were provided as needed. After Visit Summary printed and given to patient which includes a list of additional screenings\tests needed.      I offered to discuss end of life issues, including information on how to make advance directives that the patient could use to name someone who would make medical decisions on their behalf if they became too ill to make themselves.    ___Patient declined  _X_Patient is interested, I provided  paper work and offered to discuss.    Follow up in about 1 year (around 7/18/2024).    Harrison Fitzgerald NP    I offered to discuss advanced care planning, including how to pick a person who would make decisions for you if you were unable to make them for yourself, called a health care power of , and what kind of decisions you might make such as use of life sustaining treatments such as ventilators and tube feeding when faced with a life limiting illness recorded on a living will that they will need to know. (How you want to be cared for as you near the end of your natural life)     X Patient is interested in learning more about how to make advanced directives.  I provided them paperwork and offered to discuss this with them.

## 2023-07-19 ENCOUNTER — OFFICE VISIT (OUTPATIENT)
Dept: SLEEP MEDICINE | Facility: CLINIC | Age: 58
End: 2023-07-19
Payer: MEDICARE

## 2023-07-19 VITALS
WEIGHT: 232.56 LBS | BODY MASS INDEX: 34.45 KG/M2 | HEIGHT: 69 IN | DIASTOLIC BLOOD PRESSURE: 84 MMHG | SYSTOLIC BLOOD PRESSURE: 130 MMHG | HEART RATE: 77 BPM

## 2023-07-19 DIAGNOSIS — R04.2 COUGH WITH HEMOPTYSIS: ICD-10-CM

## 2023-07-19 DIAGNOSIS — R35.1 NOCTURIA: ICD-10-CM

## 2023-07-19 DIAGNOSIS — R06.83 SNORING: ICD-10-CM

## 2023-07-19 DIAGNOSIS — R40.0 DAYTIME SLEEPINESS: ICD-10-CM

## 2023-07-19 DIAGNOSIS — R06.81 WITNESSED EPISODE OF APNEA: Primary | ICD-10-CM

## 2023-07-19 DIAGNOSIS — F51.09 OTHER INSOMNIA NOT DUE TO A SUBSTANCE OR KNOWN PHYSIOLOGICAL CONDITION: ICD-10-CM

## 2023-07-19 DIAGNOSIS — R60.0 LOWER EXTREMITY EDEMA: ICD-10-CM

## 2023-07-19 PROCEDURE — 3008F BODY MASS INDEX DOCD: CPT | Mod: HCNC,CPTII,S$GLB, | Performed by: PHYSICIAN ASSISTANT

## 2023-07-19 PROCEDURE — 1160F RVW MEDS BY RX/DR IN RCRD: CPT | Mod: HCNC,CPTII,S$GLB, | Performed by: PHYSICIAN ASSISTANT

## 2023-07-19 PROCEDURE — 3008F PR BODY MASS INDEX (BMI) DOCUMENTED: ICD-10-PCS | Mod: HCNC,CPTII,S$GLB, | Performed by: PHYSICIAN ASSISTANT

## 2023-07-19 PROCEDURE — 99999 PR PBB SHADOW E&M-EST. PATIENT-LVL IV: CPT | Mod: PBBFAC,HCNC,, | Performed by: PHYSICIAN ASSISTANT

## 2023-07-19 PROCEDURE — 3075F SYST BP GE 130 - 139MM HG: CPT | Mod: HCNC,CPTII,S$GLB, | Performed by: PHYSICIAN ASSISTANT

## 2023-07-19 PROCEDURE — 99204 OFFICE O/P NEW MOD 45 MIN: CPT | Mod: HCNC,S$GLB,, | Performed by: PHYSICIAN ASSISTANT

## 2023-07-19 PROCEDURE — 1159F MED LIST DOCD IN RCRD: CPT | Mod: HCNC,CPTII,S$GLB, | Performed by: PHYSICIAN ASSISTANT

## 2023-07-19 PROCEDURE — 3044F HG A1C LEVEL LT 7.0%: CPT | Mod: HCNC,CPTII,S$GLB, | Performed by: PHYSICIAN ASSISTANT

## 2023-07-19 PROCEDURE — 3044F PR MOST RECENT HEMOGLOBIN A1C LEVEL <7.0%: ICD-10-PCS | Mod: HCNC,CPTII,S$GLB, | Performed by: PHYSICIAN ASSISTANT

## 2023-07-19 PROCEDURE — 3075F PR MOST RECENT SYSTOLIC BLOOD PRESS GE 130-139MM HG: ICD-10-PCS | Mod: HCNC,CPTII,S$GLB, | Performed by: PHYSICIAN ASSISTANT

## 2023-07-19 PROCEDURE — 99999 PR PBB SHADOW E&M-EST. PATIENT-LVL IV: ICD-10-PCS | Mod: PBBFAC,HCNC,, | Performed by: PHYSICIAN ASSISTANT

## 2023-07-19 PROCEDURE — 1159F PR MEDICATION LIST DOCUMENTED IN MEDICAL RECORD: ICD-10-PCS | Mod: HCNC,CPTII,S$GLB, | Performed by: PHYSICIAN ASSISTANT

## 2023-07-19 PROCEDURE — 1160F PR REVIEW ALL MEDS BY PRESCRIBER/CLIN PHARMACIST DOCUMENTED: ICD-10-PCS | Mod: HCNC,CPTII,S$GLB, | Performed by: PHYSICIAN ASSISTANT

## 2023-07-19 PROCEDURE — 3079F DIAST BP 80-89 MM HG: CPT | Mod: HCNC,CPTII,S$GLB, | Performed by: PHYSICIAN ASSISTANT

## 2023-07-19 PROCEDURE — 99204 PR OFFICE/OUTPT VISIT, NEW, LEVL IV, 45-59 MIN: ICD-10-PCS | Mod: HCNC,S$GLB,, | Performed by: PHYSICIAN ASSISTANT

## 2023-07-19 PROCEDURE — 3079F PR MOST RECENT DIASTOLIC BLOOD PRESSURE 80-89 MM HG: ICD-10-PCS | Mod: HCNC,CPTII,S$GLB, | Performed by: PHYSICIAN ASSISTANT

## 2023-07-19 NOTE — PROGRESS NOTES
Referred by No ref. provider found     NEW PATIENT VISIT    Stevenson Robison Jr.  is a pleasant 58 y.o. male  with PMH significant for HTN, HLD, asthma, back pain, BMI 33+ who presents for evaluation of snoring following referral from PCP.    C/o waking in the night with gasping, choking, coughing. Wakes with scratchy sore throat. Does endorse hemoptysis in AMs, Also has been having some lower extremity edema and SOB. Has very strong family hx of cardiac disease (MIs, HF, etc.). Also reports some mild daytime sleepiness, snoring, and episodes of apnea at night witnessed by wife. States he discussed concerns with PCP who recommended evaluation for YORDAN. States he has not yet seen cardiology or pulm for sx but would like a referral for both so he can make an appointment.       SLEEP SCHEDULE   Environment    Bed Time 9:30-10PM   Sleep Latency Usually right away, sometimes hours   Arousals 2   Nocturia 2   Back to sleep Sometimes difficult   Wake time 7AM   Naps Occasionally dozes while watching tv/movie   Work        Sleep Clinic ROS  7/6/2023   Difficulty breathing through the nose?  Sometimes   Sore throat or dry mouth in the morning? Yes   Irregular or very fast heart beat?  Yes   Shortness of breath?  Yes   Acid reflux? Yes   Body aches and pains?  Yes   Morning headaches? Yes   Dizziness? Sometimes   Mood changes?  Yes   Do you exercise?  Sometimes   Do you feel like moving your legs a lot?  Yes       Past Medical History:   Diagnosis Date    Asthma     Hypertension      Patient Active Problem List   Diagnosis    Hyperlipidemia    Lumbar back pain with radiculopathy affecting bilateral lower extremity    Severe obesity (BMI 35.0-39.9) with comorbidity    Mild persistent asthma without complication    Benign essential hypertension    Oral lesion    Quadriceps weakness    Decreased range of motion (ROM) of knee       Current Outpatient Medications:     albuterol (PROVENTIL/VENTOLIN HFA) 90 mcg/actuation inhaler,  "Inhale 2 puffs into the lungs every 6 (six) hours as needed for Wheezing., Disp: 8 g, Rfl: 3    amLODIPine (NORVASC) 5 MG tablet, Take 1 tablet (5 mg total) by mouth once daily., Disp: 90 tablet, Rfl: 3    aspirin 81 MG Chew, Take 81 mg by mouth once daily., Disp: , Rfl:     cyclobenzaprine (FLEXERIL) 10 MG tablet, Take by mouth., Disp: , Rfl:     gabapentin (NEURONTIN) 100 MG capsule, Take by mouth., Disp: , Rfl:     hydroCHLOROthiazide (HYDRODIURIL) 25 MG tablet, Take 1 tablet (25 mg total) by mouth once daily., Disp: 90 tablet, Rfl: 2    hydrocodone-acetaminophen 10-325mg (NORCO)  mg Tab, Take 1 tablet by mouth 3 (three) times daily as needed., Disp: , Rfl:     levocetirizine (XYZAL) 5 MG tablet, , Disp: , Rfl:     pantoprazole (PROTONIX) 40 MG tablet, Take 1 tablet (40 mg total) by mouth before breakfast., Disp: 90 tablet, Rfl: 2    atorvastatin (LIPITOR) 40 MG tablet, Take 1 tablet (40 mg total) by mouth once daily., Disp: 90 tablet, Rfl: 3    triamcinolone acetonide 0.1% (KENALOG) 0.1 % cream, Apply topically 2 (two) times daily. for 10 days, Disp: 45 g, Rfl: 1       Vitals:    07/19/23 1414   BP: 130/84   BP Location: Left arm   Patient Position: Sitting   BP Method: Medium (Automatic)   Pulse: 77   Weight: 105.5 kg (232 lb 9.4 oz)   Height: 5' 9" (1.753 m)     Physical Exam:    GEN:   Well-appearing  Psych:  Appropriate affect, demonstrates insight  SKIN:  No rash on the face or bridge of the nose      LABS:   Lab Results   Component Value Date    HGB 15.9 03/17/2023    CO2 29 03/17/2023       RECORDS REVIEWED PREVIOUSLY:    No prior sleep testing.    ASSESSMENT    EPWORTH SLEEPINESS SCALE 7/6/2023   Sitting and reading 1   Watching TV 2   Sitting, inactive in a public place (e.g. a theatre or a meeting) 0   As a passenger in a car for an hour without a break 1   Lying down to rest in the afternoon when circumstances permit 1   Sitting and talking to someone 0   Sitting quietly after a lunch without " alcohol 1   In a car, while stopped for a few minutes in traffic 0   Total score 6     PROBLEM DESCRIPTION/ Sx on Presentation  STATUS   sx YORDAN   + snoring, + choking, gasping, coughing arousals, + witnessed apneas (per wife)  + wakes with scratchy sore throat, occasionally hemoptysis first thing in the AM  New   Daytime Sx   + sleepiness when inactive   ESS 6/24 on intake  New   Insomnia   Trouble falling asleep: sometimes difficult, but not usually   Maintenance:         wakes occasionally, sometimes difficult to return to sleep  Prior hypnotics:      trazodone 50mg  Current hypnotics:     New   Nocturia   x 2 per sleep period  New   Hemoptysis First thing in the morning      Other issues:     PLAN     -recommend sleep testing   -PSG ordered  -discussed trial therapy if YORDAN present and the patient is open to a trial of CPAP therapy  -discussed YORDAN and CPAP with patient in detail, including possible complications of untreated YORDAN like heart attack/stroke  -advised on strict driving precautions; advised never to drive drowsy  -referral to pulm and cardiology placed. Recommended to keep follow up with PCP in place for tomorrow. Advised on strict ER precautions.    Advised on plan of care. Answered all patient questions. Patient verbalized understanding and voiced agreement with plan of care.     RTC if dx of YORDAN made and CPAP ordered, will need follow up 31-90 days after receiving machine for compliance      The patient was given open opportunity to ask questions and/or express concerns about treatment plan. All questions/concerns were discussed.     Two patient identifiers used prior to evaluation.

## 2023-07-24 ENCOUNTER — TELEPHONE (OUTPATIENT)
Dept: ENDOSCOPY | Facility: HOSPITAL | Age: 58
End: 2023-07-24

## 2023-07-24 ENCOUNTER — CLINICAL SUPPORT (OUTPATIENT)
Dept: ENDOSCOPY | Facility: HOSPITAL | Age: 58
End: 2023-07-24
Attending: INTERNAL MEDICINE
Payer: MEDICARE

## 2023-07-24 DIAGNOSIS — Z12.11 COLON CANCER SCREENING: ICD-10-CM

## 2023-07-24 DIAGNOSIS — Z12.11 COLON CANCER SCREENING: Primary | ICD-10-CM

## 2023-07-24 NOTE — TELEPHONE ENCOUNTER
Called to schedule colonoscopy with patient. Due to current health concerns patient does not want to schedule at this time. New PAT deferred until later date when patient is able to schedule.

## 2023-07-27 ENCOUNTER — TELEPHONE (OUTPATIENT)
Dept: SLEEP MEDICINE | Facility: OTHER | Age: 58
End: 2023-07-27
Payer: MEDICARE

## 2023-08-10 ENCOUNTER — TELEPHONE (OUTPATIENT)
Dept: INTERNAL MEDICINE | Facility: CLINIC | Age: 58
End: 2023-08-10
Payer: MEDICARE

## 2023-08-10 NOTE — TELEPHONE ENCOUNTER
Returned call to karen. States Mr. Robison went to the store. COVID test taken 1 week ago and this was negative, and had a Z pack which he took 1 week ago States now  he has been congested for the last 3-4 days but not wheezing States has had shortness of breath when just sitting up. Mucous is small amounts and yellow. Karen is now requesting antibiotics,

## 2023-08-10 NOTE — TELEPHONE ENCOUNTER
----- Message from Linsey Piedra sent at 8/10/2023  8:40 AM CDT -----      Name of Who is Calling: BRANDEE MAR JR. [[id number]]      What is the request in detail: Pt Karen called regarding pt congestion and mucus wanted to know if a antibiotic can be sent to the pharmacy.Please contact to further discuss and advise.    Can the clinic reply by [facility]: Y      What Number to Call Back if not in [facility]: [phone]

## 2023-08-11 NOTE — TELEPHONE ENCOUNTER
Spoke with pt wife stating message below:    Levi Kaur, Marco A Ann MA  Caller: Unspecified (Yesterday,  9:43 AM)  Can do covid test at urgent care. No script needed however [facility]    Pt wife stated she was requesting to the covid home test kits Rx'd for home use just in case she needs them in the future. She said pt will still go to urgent care since he's having other problems as well.

## 2023-08-11 NOTE — TELEPHONE ENCOUNTER
SPOKE WITH PT WIFE STATING MESSAGE BELOW:    I recommend that patient is seen. Suggest urgent care. Not good practice to just sent antibiotic in contest of shortness of breath as something else could be going on.      PT WIFE STATES PT PLANS TO GO TO URGENT CARE TODAY AS PT STILL HAS SOB. SHE STAES PT BP WAS HIGH AND HE'S JOSE HARD- HEADED.     SHE'S REQUESTING RX FOR COVID TEST AS [facility] ON [address] IS ASKING.

## 2023-08-11 NOTE — TELEPHONE ENCOUNTER
I recommend that patient is seen. Suggest urgent care. Not good practice to just sent antibiotic in contest of shortness of breath as something else could be going on.

## 2023-08-14 ENCOUNTER — CLINICAL SUPPORT (OUTPATIENT)
Dept: FAMILY MEDICINE | Facility: CLINIC | Age: 58
End: 2023-08-14
Payer: MEDICARE

## 2023-08-14 ENCOUNTER — OFFICE VISIT (OUTPATIENT)
Dept: PULMONOLOGY | Facility: CLINIC | Age: 58
End: 2023-08-14
Payer: MEDICARE

## 2023-08-14 VITALS
BODY MASS INDEX: 35.06 KG/M2 | WEIGHT: 236.69 LBS | HEART RATE: 78 BPM | OXYGEN SATURATION: 97 % | SYSTOLIC BLOOD PRESSURE: 129 MMHG | HEIGHT: 69 IN | DIASTOLIC BLOOD PRESSURE: 73 MMHG

## 2023-08-14 DIAGNOSIS — J20.9 SUBACUTE BRONCHITIS: Primary | ICD-10-CM

## 2023-08-14 DIAGNOSIS — R05.9 COUGH, UNSPECIFIED TYPE: Primary | ICD-10-CM

## 2023-08-14 DIAGNOSIS — E66.01 SEVERE OBESITY (BMI 35.0-39.9) WITH COMORBIDITY: ICD-10-CM

## 2023-08-14 DIAGNOSIS — J45.21 MILD INTERMITTENT REACTIVE AIRWAY DISEASE WITH ACUTE EXACERBATION: ICD-10-CM

## 2023-08-14 DIAGNOSIS — R05.9 COUGH: ICD-10-CM

## 2023-08-14 DIAGNOSIS — R04.2 COUGH WITH HEMOPTYSIS: ICD-10-CM

## 2023-08-14 LAB
CTP QC/QA: YES
SARS-COV-2 RDRP RESP QL NAA+PROBE: NEGATIVE

## 2023-08-14 PROCEDURE — 87635 SARS-COV-2 COVID-19 AMP PRB: CPT | Mod: QW,HCNC,S$GLB, | Performed by: INTERNAL MEDICINE

## 2023-08-14 PROCEDURE — 3044F HG A1C LEVEL LT 7.0%: CPT | Mod: HCNC,CPTII,S$GLB, | Performed by: INTERNAL MEDICINE

## 2023-08-14 PROCEDURE — 87635: ICD-10-PCS | Mod: QW,HCNC,S$GLB, | Performed by: INTERNAL MEDICINE

## 2023-08-14 PROCEDURE — 99999 PR PBB SHADOW E&M-EST. PATIENT-LVL III: ICD-10-PCS | Mod: PBBFAC,HCNC,, | Performed by: INTERNAL MEDICINE

## 2023-08-14 PROCEDURE — 3008F PR BODY MASS INDEX (BMI) DOCUMENTED: ICD-10-PCS | Mod: HCNC,CPTII,S$GLB, | Performed by: INTERNAL MEDICINE

## 2023-08-14 PROCEDURE — 99204 OFFICE O/P NEW MOD 45 MIN: CPT | Mod: HCNC,S$GLB,, | Performed by: INTERNAL MEDICINE

## 2023-08-14 PROCEDURE — 3008F BODY MASS INDEX DOCD: CPT | Mod: HCNC,CPTII,S$GLB, | Performed by: INTERNAL MEDICINE

## 2023-08-14 PROCEDURE — 1159F MED LIST DOCD IN RCRD: CPT | Mod: HCNC,CPTII,S$GLB, | Performed by: INTERNAL MEDICINE

## 2023-08-14 PROCEDURE — 3078F DIAST BP <80 MM HG: CPT | Mod: HCNC,CPTII,S$GLB, | Performed by: INTERNAL MEDICINE

## 2023-08-14 PROCEDURE — 3074F PR MOST RECENT SYSTOLIC BLOOD PRESSURE < 130 MM HG: ICD-10-PCS | Mod: HCNC,CPTII,S$GLB, | Performed by: INTERNAL MEDICINE

## 2023-08-14 PROCEDURE — 1159F PR MEDICATION LIST DOCUMENTED IN MEDICAL RECORD: ICD-10-PCS | Mod: HCNC,CPTII,S$GLB, | Performed by: INTERNAL MEDICINE

## 2023-08-14 PROCEDURE — 3078F PR MOST RECENT DIASTOLIC BLOOD PRESSURE < 80 MM HG: ICD-10-PCS | Mod: HCNC,CPTII,S$GLB, | Performed by: INTERNAL MEDICINE

## 2023-08-14 PROCEDURE — 3044F PR MOST RECENT HEMOGLOBIN A1C LEVEL <7.0%: ICD-10-PCS | Mod: HCNC,CPTII,S$GLB, | Performed by: INTERNAL MEDICINE

## 2023-08-14 PROCEDURE — 3074F SYST BP LT 130 MM HG: CPT | Mod: HCNC,CPTII,S$GLB, | Performed by: INTERNAL MEDICINE

## 2023-08-14 PROCEDURE — 99999 PR PBB SHADOW E&M-EST. PATIENT-LVL III: CPT | Mod: PBBFAC,HCNC,, | Performed by: INTERNAL MEDICINE

## 2023-08-14 PROCEDURE — 99204 PR OFFICE/OUTPT VISIT, NEW, LEVL IV, 45-59 MIN: ICD-10-PCS | Mod: HCNC,S$GLB,, | Performed by: INTERNAL MEDICINE

## 2023-08-14 RX ORDER — ALBUTEROL SULFATE 90 UG/1
2 AEROSOL, METERED RESPIRATORY (INHALATION) EVERY 6 HOURS PRN
Qty: 18 G | Refills: 11 | Status: SHIPPED | OUTPATIENT
Start: 2023-08-14 | End: 2023-10-03

## 2023-08-14 RX ORDER — DOXYCYCLINE 100 MG/1
100 CAPSULE ORAL 2 TIMES DAILY
Qty: 14 CAPSULE | Refills: 0 | Status: SHIPPED | OUTPATIENT
Start: 2023-08-14 | End: 2023-09-18

## 2023-08-14 NOTE — PROGRESS NOTES
2023    Stevenson Robison Jr.  New Patient Consult    Chief Complaint   Patient presents with    Cough       HPI: 2023-   Pt is a 57 yo male with asthma, HTN, HLD, obesity here for new evaluation. His ezio Hensley assists w/ history.   Pt having sinus congestion, drainage 2 wks. Has now started to have chest congestion and yellow phlegm for a couple days. This is his main complaint today. At baseline he has chronic cough w/ clear phlegm but no chronic wheezing or SOB.  He denies fevers but is having headache today. He feels short of breath.  He has been using mucinex and albuterol inhaler - proair, feels less effective than ventolin.  He tested for covid once 2 wks ago which was negative. He states has been unable to find any OTC covid tests at pharmacy recently. He had a sick contact- roommate recently and doctor at a clinic he visits.  Has had bronchitis w/ wheezing once in the past. He doesn't regularly need inhaler but only when sick.  He has a remote smoking hx, quit 23 yrs ago.  He also is pending sleep study to eval snoring/possible YORDAN- referral to pulm placed by PA at the sleep clinic.  He has a strong family history of heart disease/CAD. His mother  2 yrs ago with a rare lung disease.      The chief complaint problem is New to me    PFSH:  Past Medical History:   Diagnosis Date    Asthma     Hypertension          Past Surgical History:   Procedure Laterality Date    BACK SURGERY      COLONOSCOPY N/A 2017    Procedure: COLONOSCOPY;  Surgeon: Wil Dimas MD;  Location: [facility] ([address] [place] [address]);  Service: Endoscopy;  Laterality: N/A;    decompression neck  2005    HERNIA REPAIR      NASAL SEPTUM SURGERY      SPINE SURGERY       Social History     Tobacco Use    Smoking status: Former     Current packs/day: 0.00    Smokeless tobacco: Former   Substance Use Topics    Alcohol use: No    Drug use: Yes     Types: Hydrocodone     Comment: prn prescription pain     Family History  "  Problem Relation Age of Onset    Lung disease Mother     Heart disease Father     Heart attack Father     Melanoma Father     No Known Problems Sister     Hypertension Brother     Melanoma Paternal Grandfather      Review of patient's allergies indicates:   Allergen Reactions    Advair diskus [fluticasone propion-salmeterol]      Hives     Other omega-3s Hives     Pt reports allergic to either an anti-inflammatory or antibiotic. PT unsure of name    Penicillins      Since childhood  Hives        Performance Status:The patient's activity level is regular exercise.      Review of Systems:  a review of eleven systems covering constitutional, Eye, HEENT, Psych, Respiratory, Cardiac, GI, , Musculoskeletal, Endocrine, Dermatologic was negative except for pertinent findings as listed ABOVE and below:  Chest pains  Heart palpitations   Right leg swelling- short time and now resolved  Gaining weight  snoring    Exam:Comprehensive exam done. /73 (BP Location: Right arm, Patient Position: Sitting, BP Method: Large (Automatic))   Pulse 78   Ht 5' 9" (1.753 m)   Wt 107.3 kg (236 lb 10.6 oz)   SpO2 97%   BMI 34.95 kg/m²   Exam included Vitals as listed, and patient's appearance and affect and alertness and mood, oral exam for yeast and hygiene and pharynx lesions and Mallapatti (M) score, neck with inspection for jvd and masses and thyroid abnormalities and lymph nodes (supraclavicular and infraclavicular nodes and axillary also examined and noted if abn), chest exam included symmetry and effort and fremitus and percussion and auscultation, cardiac exam included rhythm and gallops and murmur and rubs and jvd and edema, abdominal exam for mass and hepatosplenomegaly and tenderness and hernias and bowel sounds, Musculoskeletal exam with muscle tone and posture and mobility/gait and  strength, and skin for rashes and cyanosis and pallor and turgor, extremity for clubbing.  Findings were normal except for pertinent " findings listed below:  Mask over mouth and nose  Bilat TM's scarred with no effusion seen  HR regular  Breath sounds clear bilaterally  No edema/clubbing    Radiographs (ct chest and cxr) reviewed: view by direct vision and interpretation as below   CXR 2017- bibasilar atelectasis    Labs reviewed     Lab Results   Component Value Date    WBC 6.41 03/17/2023    HGB 15.9 03/17/2023    HCT 47.3 03/17/2023    MCV 93 03/17/2023     03/17/2023       CMP  Sodium   Date Value Ref Range Status   03/17/2023 139 136 - 145 mmol/L Final     Potassium   Date Value Ref Range Status   03/17/2023 3.9 3.5 - 5.1 mmol/L Final     Chloride   Date Value Ref Range Status   03/17/2023 102 95 - 110 mmol/L Final     CO2   Date Value Ref Range Status   03/17/2023 29 23 - 29 mmol/L Final     Glucose   Date Value Ref Range Status   03/17/2023 92 70 - 110 mg/dL Final     BUN   Date Value Ref Range Status   03/17/2023 14 6 - 20 mg/dL Final     Creatinine   Date Value Ref Range Status   03/17/2023 0.9 0.5 - 1.4 mg/dL Final     Calcium   Date Value Ref Range Status   03/17/2023 9.6 8.7 - 10.5 mg/dL Final     Total Protein   Date Value Ref Range Status   03/17/2023 7.6 6.0 - 8.4 g/dL Final     Albumin   Date Value Ref Range Status   03/17/2023 4.2 3.5 - 5.2 g/dL Final     Total Bilirubin   Date Value Ref Range Status   03/17/2023 0.9 0.1 - 1.0 mg/dL Final     Comment:     For infants and newborns, interpretation of results should be based  on gestational age, weight and in agreement with clinical  observations.    Premature Infant recommended reference ranges:  Up to 24 hours.............<8.0 mg/dL  Up to 48 hours............<12.0 mg/dL  3-5 days..................<15.0 mg/dL  6-29 days.................<15.0 mg/dL       Alkaline Phosphatase   Date Value Ref Range Status   03/17/2023 60 55 - 135 U/L Final     AST   Date Value Ref Range Status   03/17/2023 21 10 - 40 U/L Final     ALT   Date Value Ref Range Status   03/17/2023 17 10 - 44 U/L  Final     Anion Gap   Date Value Ref Range Status   03/17/2023 8 8 - 16 mmol/L Final     eGFR   Date Value Ref Range Status   03/17/2023 >60 >60 mL/min/1.73 m^2 Final         PFT was not done      Plan:  Clinical impression is apparently straight forward and impression with management as below.  Suspect viral URI  Symptomatic management and test for COVID    Problem List Items Addressed This Visit          Pulmonary    Mild reactive airways disease       Endocrine    Severe obesity (BMI 35.0-39.9) with comorbidity     Other Visit Diagnoses       Subacute bronchitis    -  Primary    Relevant Medications    VENTOLIN HFA 90 mcg/actuation inhaler    doxycycline (VIBRAMYCIN) 100 MG Cap    Other Relevant Orders    COVID-19 Routine Screening    Cough with hemoptysis                Follow up if symptoms worsen or fail to improve.    Discussed with patient above for education the following:      Patient Instructions   Bronchitis - subacute, suspect viral infection  Get repeat COVID test  Continue albuterol inhaler as needed for wheezing  Keep appointment with cardiologist  Agree with sleep study and follow up with sleep clinic  Weight loss recommended

## 2023-08-14 NOTE — PATIENT INSTRUCTIONS
Bronchitis - subacute, suspect viral infection  Get repeat COVID test  Continue albuterol inhaler as needed for wheezing  Keep appointment with cardiologist  Agree with sleep study and follow up with sleep clinic  Weight loss recommended

## 2023-08-17 DIAGNOSIS — I10 BENIGN ESSENTIAL HYPERTENSION: ICD-10-CM

## 2023-08-17 RX ORDER — AMLODIPINE BESYLATE 5 MG/1
5 TABLET ORAL DAILY
Qty: 90 TABLET | Refills: 3 | Status: SHIPPED | OUTPATIENT
Start: 2023-08-17

## 2023-08-17 NOTE — TELEPHONE ENCOUNTER
"----- Message from Avril Rangel sent at 8/17/2023  2:09 PM CDT -----  Regarding: Prescription  "Type:  Patient Call Back    Who Called:PT    What is the reqeust in detail:Pt requesting call back following up on previous messages from pharmacy for refill amLODIPine (NORVASC) 5 MG tablet pt has two pills left. Pt is schedule on 9/18. Please advise    Can the clinic reply by [facility]?no    Best Call Back Number:[phone]      Additional Information:[id number] [address]            "

## 2023-08-17 NOTE — TELEPHONE ENCOUNTER
Care Due:                  Date            Visit Type   Department     Provider  --------------------------------------------------------------------------------                                EP -                              PRIMARY      [facility] INTERNAL  Nick Mancuso  Last Visit: 06-      CARE ([facility])   MEDICINE       Sybil                              [facility]                              PRIMARY      [facility] INTERNAL  Next Visit: 09-      CARE ([facility])   MEDICINE       Ty Rodríguez                                                            Last  Test          Frequency    Reason                     Performed    Due Date  --------------------------------------------------------------------------------    Lipid Panel.  12 months..  atorvastatin.............  08- 08-    [facility] Embedded Care Due Messages. Reference number: [id number].   8/17/2023 2:36:55 PM CDT

## 2023-08-21 DIAGNOSIS — E78.2 MIXED HYPERLIPIDEMIA: Primary | ICD-10-CM

## 2023-08-21 NOTE — TELEPHONE ENCOUNTER
No care due was identified.  [facility] Embedded Care Due Messages. Reference number: [id number].   8/21/2023 4:04:33 PM CDT

## 2023-08-21 NOTE — TELEPHONE ENCOUNTER
----- Message from Geronimo Butler sent at 8/21/2023  3:17 PM CDT -----  Can the clinic reply in [facility]: NO              Please refill the medication(s) listed below. Please call the patient when the prescription(s) is ready for  at this phone number   [facility] #[id number] [place] [address]   Phone:  [phone]  Fax:  [phone]                 Medication #1atorvastatin (LIPITOR) 40 MG tablet         Medication #2           Preferred Pharmacy:[facility] #[id number] [place], [place] - [address]   Phone:  [phone]  Fax:  [phone]

## 2023-08-22 NOTE — TELEPHONE ENCOUNTER
Refill Routing Note   Medication(s) are not appropriate for processing by [facility] for the following reason(s):      Required labs outdated    [facility] action(s):  Defer Care Due:  None identified            Appointments  past 12m or future 3m with PCP    Date Provider   Last Visit   3/17/2023 Ty Rodríguez MD   Next Visit   9/18/2023 Ty Rodríguez MD   ED visits in past 90 days: 0        Note composed:10:21 AM 08/22/2023

## 2023-08-22 NOTE — TELEPHONE ENCOUNTER
Dr. Devine 06/19/23, Ms. Fitzgerald, NO 07/18/23, LOV Dr. Rodríguez 03/17/23  States out of medication for 4 days. Informed will send to MD and last Lipid level 08/05/22. Has MD appointment  09/18/23

## 2023-08-22 NOTE — TELEPHONE ENCOUNTER
----- Message from Krystyna Graham sent at 8/22/2023  1:03 PM CDT -----  Regarding: refill  Type: RX Refill Request  Who Called: Stevenson   Have you contacted your pharmacy:yes   Refill or New Rx:refill   RX Name and Strength:atorvastatin (LIPITOR) 40 MG tablet  How is the patient currently taking it? (ex. 1XDay):  Is this a 30 day or 90 day RX:    Preferred Pharmacy with phone number:[facility]/PHARMACY #[id number] - [place] - [address]  Local or Mail Order:local   Ordering Provider:    Would the patient rather a call back or a response via [facility]? Call  Best Call Back Number:[phone]    Additional Information:  Pt has been with out his medication for 4 days now. He needs this filled TODAY. States he has  been calling for days to get Rx refilled. Please give him a call back as soon as possible.

## 2023-08-23 ENCOUNTER — HOSPITAL ENCOUNTER (OUTPATIENT)
Dept: SLEEP MEDICINE | Facility: OTHER | Age: 58
Discharge: HOME | End: 2023-08-23
Payer: MEDICARE

## 2023-08-23 DIAGNOSIS — R35.1 NOCTURIA: ICD-10-CM

## 2023-08-23 DIAGNOSIS — R40.0 DAYTIME SLEEPINESS: ICD-10-CM

## 2023-08-23 DIAGNOSIS — R60.0 LOWER EXTREMITY EDEMA: ICD-10-CM

## 2023-08-23 DIAGNOSIS — R06.81 WITNESSED EPISODE OF APNEA: ICD-10-CM

## 2023-08-23 DIAGNOSIS — R04.2 COUGH WITH HEMOPTYSIS: ICD-10-CM

## 2023-08-23 DIAGNOSIS — R06.83 SNORING: ICD-10-CM

## 2023-08-23 DIAGNOSIS — F51.09 OTHER INSOMNIA NOT DUE TO A SUBSTANCE OR KNOWN PHYSIOLOGICAL CONDITION: ICD-10-CM

## 2023-08-23 PROCEDURE — 95810 POLYSOM 6/> YRS 4/> PARAM: CPT | Mod: HCNC

## 2023-08-24 PROBLEM — R06.81 WITNESSED EPISODE OF APNEA: Status: ACTIVE | Noted: 2023-08-24

## 2023-08-24 RX ORDER — ATORVASTATIN CALCIUM 40 MG/1
40 TABLET, FILM COATED ORAL DAILY
Qty: 90 TABLET | Refills: 0 | Status: SHIPPED | OUTPATIENT
Start: 2023-08-24 | End: 2023-11-17

## 2023-08-24 NOTE — PROGRESS NOTES
Stevenson Robison to [facility] on 8/23/23 for an overnight baseline study.     Pt did not meet criteria for split night study.       Post study information given to pt in AM

## 2023-08-27 PROCEDURE — 95810 POLYSOM 6/> YRS 4/> PARAM: CPT | Mod: 26,HCNC,, | Performed by: INTERNAL MEDICINE

## 2023-08-27 PROCEDURE — 95810 PR POLYSOMNOGRAPHY, 4 OR MORE: ICD-10-PCS | Mod: 26,HCNC,, | Performed by: INTERNAL MEDICINE

## 2023-08-28 NOTE — PROCEDURES
"[facility]    Polysomnography Interpretation Report    Patient Name:  Stevenson Robison  MRN#:  [id number]  :  1965  Study Date:  2023  Referring Provider:  MARTI FERNANDES PA-C    Indications for Polysomnography:  The patient is a 58 year old Male who is 5' 9" and weighs 232.0 lbs.  His BMI equals 34.4.  [place] was - and Neck Circumference was -.  A full night polysomnogram was performed to evaluate for -.    Polysomnogram Data  A full night polysomnogram recorded the standard physiologic parameters including EEG, EOG, EMG, EKG, nasal and oral airflow.  Respiratory parameters of chest and abdominal movements were recorded with Peizo-Crystal motion transducers.  Oxygen saturation was recorded by pulse oximetry.    Sleep Architecture  The total recording time of the polysomnogram was 400.4 minutes.  The total sleep time was 175.0 minutes.  The patient spent 22.9% of total sleep time in Stage N1, 52.9% in Stage N2, 13.7% in Stages N3, and 10.6% in REM.  Sleep latency was 29.4 minutes.  REM latency was 183.5 minutes.  Sleep Efficiency was 43.7%.  Wake after sleep onset was 195.5 minutes.    Respiratory Events  The polysomnogram revealed a presence of 13 obstructive, - central, and - mixed apneas resulting in a Total Apnea index of 4.5 events per hour.  There were 69 hypopneas resulting in a Total Hypopnea index of 23.7 events per hour.  The combined Apnea/Hypopnea index was 28.1 events per hour.  There were a total of - RERA events resulting in a Respiratory Disturbance Index (RDI) of 28.1 events per hour.     Mean oxygen saturation was 94.0%.  The lowest oxygen saturation during sleep was 76.0%.  Time spent ?88% oxygen saturation was 8.4 minutes (2.1%).    Limb Activity  There were 53 limb movements recorded.  Of this total, 53 were classified as PLMs.  Of the PLMs, 4 were associated with arousals.  The Limb Movement index was 18.2 per hour while the PLM index was 18.2 per hour and PLM with " "arousals index was 1.4 per hour.    Cardiac:  single lead EKG revealed normal sinus rhythm with very frequent PVCs    Oxygenation:  Hypoxemia was seen only in relation to obstructive events.    Impression:  -moderate to severe obstructive sleep apnea with associated hypoxemia    Recommendations:    -treatment for YORDAN seen in this study is recommended.  -the patient has follow up with Sleep Medicine          Martín Tomas MD    (This Sleep Study was interpreted by a Board Certified Sleep Specialist who conducted an epoch-by-epoch review of the entire raw data recording.)  (The indication for this sleep study was reviewed and deemed appropriate by AASM Practice Parameters or other reasons by a Board Certified Sleep Specialist.)      [facility]    Diagnostic PSG Report    Patient Name: Stevenson Robison Study Date: 8/23/2023   YOB: 1965 MRN #: [id number]   Age: 58 year JULIUS #: [id number]   Sex: Male Referring Provider: MARTI FERNANDES PA-C   Height: 5' 9" Recording Tech: Munir Brice RPSGT   Weight: 232.0 lbs Scoring Tech: Kendrcik Reardon RRT RPSGT   BMI: 34.4 Interpreting Physician: -   ESS: - Neck Circumference: -     Study Overview    Lights Off: 10:26:37 PM  Count Index   Lights On: 05:06:58 AM Awakenings: 32 11.0   Time in Bed: 400.4 min. Arousals: 70 24.0   Total Sleep Time: 175.0 min. Apneas & Hypopneas: 82 28.1    Sleep Efficiency: 43.7% Limb Movements: 53 18.2   Sleep Latency: 29.4 min. Snores: - -   Wake After Sleep Onset: 195.5 min. Desaturations: 97 33.3    REM Latency from Sleep Onset: 183.5 min. Minimum SpO2 TST: 76.0%        Sleep Architecture   % of Time in Bed  Stages Time (mins) % Sleep Time   Wake 225.5    Stage N1 40.0 22.9%   Stage N2 92.5 52.9%   Stage N3 24.0 13.7%   REM 18.5 10.6%         Arousal Summary     NREM REM Sleep Index   Respiratory Arousals 17 2 19 6.5   PLM Arousals 4 - 4 1.4   Isolated Limb Movement Arousals - - - -   Spontaneous Arousals 41 6 47 16.1 "   Total 62 8 70 24.0       Limb Movement Summary     Count Index   Isolated Limb Movements - -   Periodic Limb Movements (PLMs) 53 18.2   Total Limb Movements 53 18.2         Respiratory Summary     By Sleep Stage By Body Position Total    NREM REM Supine Non-Supine    Time (min) 156.5 18.5 54.0 121.0 175.0           Obstructive Apnea 8 5 6 7 13   Mixed Apnea - - - - -   Central Apnea - - - - -   Central Apnea Index - - - - -   Total Apneas 8 5 6 7 13   Total Apnea Index 3.1 16.2 6.7 3.5 4.5           Hypopnea 62 7 38 31 69   Hypopnea Index 23.8 22.7 42.2 15.4 23.7           Apnea & Hypopnea 70 12 44 38 82   Apnea & Hypopnea Index 26.8 38.9 48.9 18.8 28.1           RERAs - - - - -   RERA Index - - - - -           RDI 26.8 38.9 48.9 18.8 28.1     Scoring Criteria: Hypopneas scored at 4% desaturation criteria.    Respiratory Event Durations     Apnea Hypopnea    NREM REM NREM REM   Average (seconds) 21.9 27.1 19.6 28.0   Maximum (seconds) 30.4 32.0 35.7 46.8       Oxygen Saturation Summary     Wake NREM REM TST TIB   Average SpO2 94.5% 93.3% 93.2% 93.3% 94.0%   Minimum SpO2 78.0% 76.0% 79.0% 76.0% 76.0%   Maximum SpO2 99.0% 98.0% 98.0% 98.0% 99.0%       Oxygen Saturation Distribution    Range (%) Time in range (min) Time in range (%)   90.0 - 100.0 369.3 94.0%   80.0 - 90.0 23.1 5.9%   70.0 - 80.0 0.4 0.1%   60.0 - 70.0 - -   50.0 - 60.0 - -   0.0 - 50.0 - -   Time Spent ?88% SpO2    Range (%) Time in range (min) Time in range (%)   0.0 - 88.0 8.4 2.1%          Count Index   Desaturations 97 33.3      Cardiac Summary     Wake NREM REM Sleep Total   Average Pulse Rate (BPM) 65.9 62.0 64.4 62.2 64.3   Minimum Pulse Rate (BPM) 55.0 50.0 57.0 50.0 50.0   Maximum Pulse Rate (BPM) 95.0 87.0 80.0 87.0 95.0     Pulse Rate Distribution    Range (bpm) Time in range (min) Time in range (%)   0.0 - 40.0 - -   40.0 - 60.0 50.8 12.9%   60.0 - 80.0 337.4 85.7%   80.0 - 100.0 5.3 1.4%   100.0 - 120.0 - -   120.0 - 140.0 - -   140.0 -  200.0 - -     EtCO2 Summary    Stage Min (mmHg) Average (mmHg) Max (mmHg)   Wake - - -   NREM(1+2+3) - - -   REM - - -     Range (mmHg) Time in range (min) Time in range (%)   - - -   - - -   - - -   - - -   - - -     TcCO2 Summary    Stage Min (mmHg) Average (mmHg) Max (mmHg)   Wake - - -   NREM(1+2+3) - - -   REM - - -     Range (mmHg) Time in range (min) Time in range (%)   20.0 - 40.0 - -   40.0 - 50.0 - -   50.0 - 55.0 - -   55.0 - 100.0 - -   Excluded data <20.0 & >65.0 400.5 100.0%     Comments    -

## 2023-08-30 ENCOUNTER — LAB VISIT (OUTPATIENT)
Dept: LAB | Facility: HOSPITAL | Age: 58
End: 2023-08-30
Attending: INTERNAL MEDICINE
Payer: MEDICARE

## 2023-08-30 ENCOUNTER — PATIENT MESSAGE (OUTPATIENT)
Dept: SLEEP MEDICINE | Facility: CLINIC | Age: 58
End: 2023-08-30
Payer: MEDICARE

## 2023-08-30 ENCOUNTER — OFFICE VISIT (OUTPATIENT)
Dept: CARDIOLOGY | Facility: CLINIC | Age: 58
End: 2023-08-30
Payer: MEDICARE

## 2023-08-30 VITALS
HEIGHT: 69 IN | OXYGEN SATURATION: 98 % | BODY MASS INDEX: 35.66 KG/M2 | HEART RATE: 63 BPM | SYSTOLIC BLOOD PRESSURE: 154 MMHG | DIASTOLIC BLOOD PRESSURE: 85 MMHG | WEIGHT: 240.75 LBS

## 2023-08-30 DIAGNOSIS — R60.0 LOWER EXTREMITY EDEMA: ICD-10-CM

## 2023-08-30 DIAGNOSIS — G47.33 OSA (OBSTRUCTIVE SLEEP APNEA): Primary | ICD-10-CM

## 2023-08-30 DIAGNOSIS — I10 BENIGN ESSENTIAL HYPERTENSION: ICD-10-CM

## 2023-08-30 DIAGNOSIS — I10 BENIGN ESSENTIAL HYPERTENSION: Primary | ICD-10-CM

## 2023-08-30 DIAGNOSIS — E78.2 MIXED HYPERLIPIDEMIA: ICD-10-CM

## 2023-08-30 DIAGNOSIS — E66.01 SEVERE OBESITY (BMI 35.0-39.9) WITH COMORBIDITY: ICD-10-CM

## 2023-08-30 DIAGNOSIS — G47.33 OSA (OBSTRUCTIVE SLEEP APNEA): ICD-10-CM

## 2023-08-30 DIAGNOSIS — R07.9 ACUTE CHEST PAIN: ICD-10-CM

## 2023-08-30 DIAGNOSIS — R04.2 COUGH WITH HEMOPTYSIS: ICD-10-CM

## 2023-08-30 LAB
ANION GAP SERPL CALC-SCNC: 8 MMOL/L (ref 8–16)
BUN SERPL-MCNC: 14 MG/DL (ref 6–20)
CALCIUM SERPL-MCNC: 9.7 MG/DL (ref 8.7–10.5)
CHLORIDE SERPL-SCNC: 103 MMOL/L (ref 95–110)
CO2 SERPL-SCNC: 28 MMOL/L (ref 23–29)
CREAT SERPL-MCNC: 0.9 MG/DL (ref 0.5–1.4)
EST. GFR  (NO RACE VARIABLE): >60 ML/MIN/1.73 M^2
GLUCOSE SERPL-MCNC: 94 MG/DL (ref 70–110)
POTASSIUM SERPL-SCNC: 4.9 MMOL/L (ref 3.5–5.1)
SODIUM SERPL-SCNC: 139 MMOL/L (ref 136–145)

## 2023-08-30 PROCEDURE — 3077F SYST BP >= 140 MM HG: CPT | Mod: HCNC,CPTII,S$PBB, | Performed by: INTERNAL MEDICINE

## 2023-08-30 PROCEDURE — 3008F BODY MASS INDEX DOCD: CPT | Mod: HCNC,CPTII,S$PBB, | Performed by: INTERNAL MEDICINE

## 2023-08-30 PROCEDURE — 3077F PR MOST RECENT SYSTOLIC BLOOD PRESSURE >= 140 MM HG: ICD-10-PCS | Mod: HCNC,CPTII,S$PBB, | Performed by: INTERNAL MEDICINE

## 2023-08-30 PROCEDURE — 1160F RVW MEDS BY RX/DR IN RCRD: CPT | Mod: HCNC,CPTII,S$PBB, | Performed by: INTERNAL MEDICINE

## 2023-08-30 PROCEDURE — 99204 OFFICE O/P NEW MOD 45 MIN: CPT | Mod: HCNC,S$PBB,, | Performed by: INTERNAL MEDICINE

## 2023-08-30 PROCEDURE — 3044F HG A1C LEVEL LT 7.0%: CPT | Mod: HCNC,CPTII,S$PBB, | Performed by: INTERNAL MEDICINE

## 2023-08-30 PROCEDURE — 80048 BASIC METABOLIC PNL TOTAL CA: CPT | Mod: HCNC | Performed by: INTERNAL MEDICINE

## 2023-08-30 PROCEDURE — 99999 PR PBB SHADOW E&M-EST. PATIENT-LVL IV: ICD-10-PCS | Mod: PBBFAC,HCNC,, | Performed by: INTERNAL MEDICINE

## 2023-08-30 PROCEDURE — 3008F PR BODY MASS INDEX (BMI) DOCUMENTED: ICD-10-PCS | Mod: HCNC,CPTII,S$PBB, | Performed by: INTERNAL MEDICINE

## 2023-08-30 PROCEDURE — 3079F PR MOST RECENT DIASTOLIC BLOOD PRESSURE 80-89 MM HG: ICD-10-PCS | Mod: HCNC,CPTII,S$PBB, | Performed by: INTERNAL MEDICINE

## 2023-08-30 PROCEDURE — 1160F PR REVIEW ALL MEDS BY PRESCRIBER/CLIN PHARMACIST DOCUMENTED: ICD-10-PCS | Mod: HCNC,CPTII,S$PBB, | Performed by: INTERNAL MEDICINE

## 2023-08-30 PROCEDURE — 99999 PR PBB SHADOW E&M-EST. PATIENT-LVL IV: CPT | Mod: PBBFAC,HCNC,, | Performed by: INTERNAL MEDICINE

## 2023-08-30 PROCEDURE — 99204 PR OFFICE/OUTPT VISIT, NEW, LEVL IV, 45-59 MIN: ICD-10-PCS | Mod: HCNC,S$PBB,, | Performed by: INTERNAL MEDICINE

## 2023-08-30 PROCEDURE — 1159F MED LIST DOCD IN RCRD: CPT | Mod: HCNC,CPTII,S$PBB, | Performed by: INTERNAL MEDICINE

## 2023-08-30 PROCEDURE — 36415 COLL VENOUS BLD VENIPUNCTURE: CPT | Mod: HCNC | Performed by: INTERNAL MEDICINE

## 2023-08-30 PROCEDURE — 3079F DIAST BP 80-89 MM HG: CPT | Mod: HCNC,CPTII,S$PBB, | Performed by: INTERNAL MEDICINE

## 2023-08-30 PROCEDURE — 1159F PR MEDICATION LIST DOCUMENTED IN MEDICAL RECORD: ICD-10-PCS | Mod: HCNC,CPTII,S$PBB, | Performed by: INTERNAL MEDICINE

## 2023-08-30 PROCEDURE — 3044F PR MOST RECENT HEMOGLOBIN A1C LEVEL <7.0%: ICD-10-PCS | Mod: HCNC,CPTII,S$PBB, | Performed by: INTERNAL MEDICINE

## 2023-08-30 NOTE — PROGRESS NOTES
"Subjective:   Patient ID:  Stevenson Robison Jr. is a 58 y.o. male who presents for follow up of Chest Pain, Shortness of Breath, and Leg Swelling      HPI: Very pleasant man back with a series of concerns.  First, he's had a couple of episodes of significant right leg swelling that lasted about two days without any swelling in the left leg, though he had some redness around the left ankle too.  Secondly, but perhaps associated, he woke up coughing one night with his throat feeling dry and he coughed up bloody sputum.  He had one more similar episode with blood tinged sputum.    He's also felt more short of breath with exertion and sometimes there is chest discomfort, though he also has this frequently at rest with emotional stress.    He lost his younger brother to an apparent MI last year.      /78 on my manual check.    2019 HPI: Very pleasant man last seen by me on August 20, 2013.  He weighed 218 that day and over the last 6 years he's been as low as 168, but he's gained a bunch back.  He's 251 today and he says that's down from a peak of 270.     BP was recently elevated at the drug store (140s/100) and this prompted him to seek care again.     He was on valsartan briefly but he felt short of breath and lightheaded.  He stopped and it went away.  He's only on HCTZ 12.5 now.     He's not on the atorvastatin 20 that I prescribed back in 2013.     Exercise: "I do a lot of walking"     No excessive daytime sleepiness.  Wife says he snores a bunch but doesn't have obvious apneic episodes.        Patient Active Problem List   Diagnosis    Hyperlipidemia    Lumbar back pain with radiculopathy affecting bilateral lower extremity    Severe obesity (BMI 35.0-39.9) with comorbidity    Mild reactive airways disease    Benign essential hypertension    Oral lesion    Quadriceps weakness    Decreased range of motion (ROM) of knee    Witnessed episode of apnea    Moderate to severe YORDAN       Current Outpatient Medications "   Medication Sig    amLODIPine (NORVASC) 5 MG tablet Take 1 tablet (5 mg total) by mouth once daily.    aspirin 81 MG Chew Take 81 mg by mouth once daily.    atorvastatin (LIPITOR) 40 MG tablet Take 1 tablet (40 mg total) by mouth once daily.    cyclobenzaprine (FLEXERIL) 10 MG tablet Take by mouth.    gabapentin (NEURONTIN) 100 MG capsule Take by mouth.    hydroCHLOROthiazide (HYDRODIURIL) 25 MG tablet Take 1 tablet (25 mg total) by mouth once daily.    hydrocodone-acetaminophen 10-325mg (NORCO)  mg Tab Take 1 tablet by mouth 3 (three) times daily as needed.    pantoprazole (PROTONIX) 40 MG tablet Take 1 tablet (40 mg total) by mouth before breakfast.    VENTOLIN HFA 90 mcg/actuation inhaler Inhale 2 puffs into the lungs every 6 (six) hours as needed for Wheezing. Rescue    doxycycline (VIBRAMYCIN) 100 MG Cap Take 1 capsule (100 mg total) by mouth 2 (two) times daily. (Patient not taking: Reported on 8/30/2023)    levocetirizine (XYZAL) 5 MG tablet     triamcinolone acetonide 0.1% (KENALOG) 0.1 % cream Apply topically 2 (two) times daily. for 10 days     No current facility-administered medications for this visit.       ROS  The review of systems is negative except as above.     Objective:   Physical Exam  Vitals reviewed.   Constitutional:       Appearance: He is well-developed.   HENT:      Head: Normocephalic and atraumatic.   Eyes:      General: No scleral icterus.     Conjunctiva/sclera: Conjunctivae normal.   Neck:      Vascular: No JVD.   Cardiovascular:      Rate and Rhythm: Normal rate and regular rhythm.      Pulses: Intact distal pulses.      Heart sounds: Normal heart sounds. No murmur heard.     No friction rub. No gallop.   Pulmonary:      Effort: Pulmonary effort is normal.      Breath sounds: Normal breath sounds. No wheezing or rales.   Abdominal:      General: Bowel sounds are normal. There is no distension.      Palpations: Abdomen is soft.      Tenderness: There is no abdominal tenderness.    Musculoskeletal:         General: Normal range of motion.      Cervical back: Normal range of motion and neck supple.   Skin:     General: Skin is warm and dry.      Findings: No erythema or rash.   Neurological:      Mental Status: He is alert and oriented to person, place, and time.   Psychiatric:         Behavior: Behavior normal.         Thought Content: Thought content normal.         Judgment: Judgment normal.         Lab Results   Component Value Date    WBC 6.41 03/17/2023    HGB 15.9 03/17/2023    HCT 47.3 03/17/2023    MCV 93 03/17/2023     03/17/2023         Chemistry        Component Value Date/Time     03/17/2023 1439    K 3.9 03/17/2023 1439     03/17/2023 1439    CO2 29 03/17/2023 1439    BUN 14 03/17/2023 1439    CREATININE 0.9 03/17/2023 1439    GLU 92 03/17/2023 1439        Component Value Date/Time    CALCIUM 9.6 03/17/2023 1439    ALKPHOS 60 03/17/2023 1439    AST 21 03/17/2023 1439    ALT 17 03/17/2023 1439    BILITOT 0.9 03/17/2023 1439    ESTGFRAFRICA >60 11/23/2021 0708    EGFRNONAA >60 11/23/2021 0708            Lab Results   Component Value Date    CHOL 152 08/05/2022    CHOL 209 (H) 11/23/2021    CHOL 203 (H) 01/23/2020     Lab Results   Component Value Date    HDL 52 08/05/2022    HDL 43 11/23/2021    HDL 36 (L) 01/23/2020     Lab Results   Component Value Date    LDLCALC 89.2 08/05/2022    LDLCALC 148.0 11/23/2021    LDLCALC 91.2 01/23/2020     Lab Results   Component Value Date    TRIG 54 08/05/2022    TRIG 90 11/23/2021    TRIG 379 (H) 01/23/2020     Lab Results   Component Value Date    CHOLHDL 34.2 08/05/2022    CHOLHDL 20.6 11/23/2021    CHOLHDL 17.7 (L) 01/23/2020       Lab Results   Component Value Date    TSH 0.595 03/17/2023       Lab Results   Component Value Date    HGBA1C 5.4 03/17/2023       Assessment:     1. Benign essential hypertension    2. Cough with hemoptysis    3. Lower extremity edema    4. Mixed hyperlipidemia    5. Severe obesity (BMI  35.0-39.9) with comorbidity    6. Moderate to severe YORDAN        Plan:     CTA to r/o PE    Stress echo    Continue current medicines.    Diet/exercise goals reinforced.    F/U 12 months

## 2023-09-06 ENCOUNTER — HOSPITAL ENCOUNTER (OUTPATIENT)
Dept: RADIOLOGY | Facility: HOSPITAL | Age: 58
Discharge: HOME | End: 2023-09-06
Attending: INTERNAL MEDICINE
Payer: MEDICARE

## 2023-09-06 DIAGNOSIS — R07.9 ACUTE CHEST PAIN: ICD-10-CM

## 2023-09-06 PROCEDURE — 71275 CT ANGIOGRAPHY CHEST: CPT | Mod: 26,HCNC,, | Performed by: STUDENT IN AN ORGANIZED HEALTH CARE EDUCATION/TRAINING PROGRAM

## 2023-09-06 PROCEDURE — 25500020 PHARM REV CODE 255: Mod: HCNC | Performed by: INTERNAL MEDICINE

## 2023-09-06 PROCEDURE — 71275 CT ANGIOGRAPHY CHEST: CPT | Mod: TC,HCNC

## 2023-09-06 PROCEDURE — 71275 CTA CHEST NON CORONARY (PE STUDIES): ICD-10-PCS | Mod: 26,HCNC,, | Performed by: STUDENT IN AN ORGANIZED HEALTH CARE EDUCATION/TRAINING PROGRAM

## 2023-09-06 RX ADMIN — IOHEXOL 100 ML: 350 INJECTION, SOLUTION INTRAVENOUS at 07:09

## 2023-09-08 ENCOUNTER — HOSPITAL ENCOUNTER (OUTPATIENT)
Dept: CARDIOLOGY | Facility: HOSPITAL | Age: 58
Discharge: HOME | End: 2023-09-08
Attending: INTERNAL MEDICINE
Payer: MEDICARE

## 2023-09-08 VITALS
BODY MASS INDEX: 35.55 KG/M2 | WEIGHT: 240 LBS | DIASTOLIC BLOOD PRESSURE: 72 MMHG | HEIGHT: 69 IN | HEART RATE: 87 BPM | SYSTOLIC BLOOD PRESSURE: 128 MMHG

## 2023-09-08 DIAGNOSIS — R07.9 ACUTE CHEST PAIN: ICD-10-CM

## 2023-09-08 LAB
ASCENDING AORTA: 3.22 CM
AV INDEX (PROSTH): 0.64
AV MEAN GRADIENT: 5 MMHG
AV PEAK GRADIENT: 9 MMHG
AV VALVE AREA BY VELOCITY RATIO: 2.39 CM²
AV VALVE AREA: 2.4 CM²
AV VELOCITY RATIO: 0.64
BSA FOR ECHO PROCEDURE: 2.3 M2
CV ECHO LV RWT: 0.36 CM
CV STRESS BASE HR: 87 BPM
DIASTOLIC BLOOD PRESSURE: 72 MMHG
DOP CALC AO PEAK VEL: 1.53 M/S
DOP CALC AO VTI: 30.91 CM
DOP CALC LVOT AREA: 3.7 CM2
DOP CALC LVOT DIAMETER: 2.18 CM
DOP CALC LVOT PEAK VEL: 0.98 M/S
DOP CALC LVOT STROKE VOLUME: 74.05 CM3
DOP CALC MV VTI: 29.45 CM
DOP CALC RVOT PEAK VEL: 0.86 M/S
DOP CALC RVOT VTI: 19.09 CM
DOP CALCLVOT PEAK VEL VTI: 19.85 CM
E WAVE DECELERATION TIME: 190.2 MSEC
E/A RATIO: 0.8
E/E' RATIO: 7.86 M/S
ECHO LV POSTERIOR WALL: 0.86 CM (ref 0.6–1.1)
EJECTION FRACTION: 45 %
FRACTIONAL SHORTENING: 26 % (ref 28–44)
INTERVENTRICULAR SEPTUM: 0.83 CM (ref 0.6–1.1)
IVRT: 102.76 MSEC
LA MAJOR: 6.37 CM
LA MINOR: 6.08 CM
LA WIDTH: 3.49 CM
LEFT ATRIUM SIZE: 4.05 CM
LEFT ATRIUM VOLUME INDEX MOD: 24.8 ML/M2
LEFT ATRIUM VOLUME INDEX: 33.5 ML/M2
LEFT ATRIUM VOLUME MOD: 55.33 CM3
LEFT ATRIUM VOLUME: 74.75 CM3
LEFT INTERNAL DIMENSION IN SYSTOLE: 3.6 CM (ref 2.1–4)
LEFT VENTRICLE DIASTOLIC VOLUME INDEX: 49.12 ML/M2
LEFT VENTRICLE DIASTOLIC VOLUME: 109.54 ML
LEFT VENTRICLE MASS INDEX: 62 G/M2
LEFT VENTRICLE SYSTOLIC VOLUME INDEX: 24.4 ML/M2
LEFT VENTRICLE SYSTOLIC VOLUME: 54.35 ML
LEFT VENTRICULAR INTERNAL DIMENSION IN DIASTOLE: 4.84 CM (ref 3.5–6)
LEFT VENTRICULAR MASS: 137.93 G
LV LATERAL E/E' RATIO: 6.11 M/S
LV SEPTAL E/E' RATIO: 11 M/S
MV A" WAVE DURATION": 9.71 MSEC
MV PEAK A VEL: 0.69 M/S
MV PEAK E VEL: 0.55 M/S
MV PEAK GRADIENT: 3 MMHG
MV STENOSIS PRESSURE HALF TIME: 55.16 MS
MV VALVE AREA BY CONTINUITY EQUATION: 2.51 CM2
MV VALVE AREA P 1/2 METHOD: 3.99 CM2
OHS CV CPX 1 MINUTE RECOVERY HEART RATE: 103 BPM
OHS CV CPX 85 PERCENT MAX PREDICTED HEART RATE MALE: 138
OHS CV CPX ESTIMATED METS: 8
OHS CV CPX MAX PREDICTED HEART RATE: 162
OHS CV CPX PATIENT IS FEMALE: 0
OHS CV CPX PATIENT IS MALE: 1
OHS CV CPX PEAK DIASTOLIC BLOOD PRESSURE: 70 MMHG
OHS CV CPX PEAK HEAR RATE: 136 BPM
OHS CV CPX PEAK RATE PRESSURE PRODUCT: ABNORMAL
OHS CV CPX PEAK SYSTOLIC BLOOD PRESSURE: 175 MMHG
OHS CV CPX PERCENT MAX PREDICTED HEART RATE ACHIEVED: 84
OHS CV CPX RATE PRESSURE PRODUCT PRESENTING: ABNORMAL
PISA TR MAX VEL: 2.47 M/S
PULM VEIN S/D RATIO: 0.85
PV MEAN GRADIENT: 2 MMHG
PV PEAK D VEL: 0.4 M/S
PV PEAK GRADIENT: 4 MMHG
PV PEAK S VEL: 0.34 M/S
PV PEAK VELOCITY: 1.03 M/S
RA MAJOR: 4.9 CM
RA PRESSURE ESTIMATED: 3 MMHG
RA WIDTH: 3.86 CM
RIGHT VENTRICULAR END-DIASTOLIC DIMENSION: 3.2 CM
RV TB RVSP: 5 MMHG
SINUS: 2.93 CM
STJ: 2.75 CM
STRESS ECHO POST EXERCISE DUR MIN: 5 MINUTES
STRESS ECHO POST EXERCISE DUR SEC: 6 SECONDS
SYSTOLIC BLOOD PRESSURE: 128 MMHG
TDI LATERAL: 0.09 M/S
TDI SEPTAL: 0.05 M/S
TDI: 0.07 M/S
TR MAX PG: 24 MMHG
TRICUSPID ANNULAR PLANE SYSTOLIC EXCURSION: 2.04 CM
TV REST PULMONARY ARTERY PRESSURE: 27 MMHG
Z-SCORE OF LEFT VENTRICULAR DIMENSION IN END DIASTOLE: -4.88
Z-SCORE OF LEFT VENTRICULAR DIMENSION IN END SYSTOLE: -2.24

## 2023-09-08 PROCEDURE — 93325 DOPPLER ECHO COLOR FLOW MAPG: CPT | Mod: 26,HCNC,, | Performed by: INTERNAL MEDICINE

## 2023-09-08 PROCEDURE — 93351 STRESS ECHO (CUPID ONLY): ICD-10-PCS | Mod: 26,HCNC,, | Performed by: INTERNAL MEDICINE

## 2023-09-08 PROCEDURE — 93351 STRESS TTE COMPLETE: CPT | Mod: 26,HCNC,, | Performed by: INTERNAL MEDICINE

## 2023-09-08 PROCEDURE — 93320 STRESS ECHO (CUPID ONLY): ICD-10-PCS | Mod: 26,HCNC,, | Performed by: INTERNAL MEDICINE

## 2023-09-08 PROCEDURE — 93320 DOPPLER ECHO COMPLETE: CPT | Mod: 26,HCNC,, | Performed by: INTERNAL MEDICINE

## 2023-09-08 PROCEDURE — 93325 STRESS ECHO (CUPID ONLY): ICD-10-PCS | Mod: 26,HCNC,, | Performed by: INTERNAL MEDICINE

## 2023-09-08 PROCEDURE — 93325 DOPPLER ECHO COLOR FLOW MAPG: CPT | Mod: HCNC

## 2023-09-18 ENCOUNTER — OFFICE VISIT (OUTPATIENT)
Dept: INTERNAL MEDICINE | Facility: CLINIC | Age: 58
End: 2023-09-18
Attending: INTERNAL MEDICINE
Payer: MEDICARE

## 2023-09-18 ENCOUNTER — LAB VISIT (OUTPATIENT)
Dept: LAB | Facility: OTHER | Age: 58
End: 2023-09-18
Attending: INTERNAL MEDICINE
Payer: MEDICARE

## 2023-09-18 VITALS
SYSTOLIC BLOOD PRESSURE: 126 MMHG | HEIGHT: 69 IN | OXYGEN SATURATION: 97 % | DIASTOLIC BLOOD PRESSURE: 80 MMHG | HEART RATE: 89 BPM | WEIGHT: 242.94 LBS | BODY MASS INDEX: 35.98 KG/M2

## 2023-09-18 DIAGNOSIS — R93.1 DECREASED CARDIAC EJECTION FRACTION: Primary | ICD-10-CM

## 2023-09-18 DIAGNOSIS — I70.0 AORTIC ATHEROSCLEROSIS: ICD-10-CM

## 2023-09-18 DIAGNOSIS — G47.33 OSA (OBSTRUCTIVE SLEEP APNEA): ICD-10-CM

## 2023-09-18 DIAGNOSIS — R93.1 DECREASED CARDIAC EJECTION FRACTION: ICD-10-CM

## 2023-09-18 DIAGNOSIS — I10 BENIGN ESSENTIAL HYPERTENSION: ICD-10-CM

## 2023-09-18 DIAGNOSIS — E66.01 SEVERE OBESITY (BMI 35.0-39.9) WITH COMORBIDITY: ICD-10-CM

## 2023-09-18 PROBLEM — R06.81 WITNESSED EPISODE OF APNEA: Status: RESOLVED | Noted: 2023-08-24 | Resolved: 2023-09-18

## 2023-09-18 LAB — BNP SERPL-MCNC: 12 PG/ML (ref 0–99)

## 2023-09-18 PROCEDURE — 99215 PR OFFICE/OUTPT VISIT, EST, LEVL V, 40-54 MIN: ICD-10-PCS | Mod: HCNC,S$GLB,, | Performed by: INTERNAL MEDICINE

## 2023-09-18 PROCEDURE — 83880 ASSAY OF NATRIURETIC PEPTIDE: CPT | Mod: HCNC | Performed by: INTERNAL MEDICINE

## 2023-09-18 PROCEDURE — 3079F PR MOST RECENT DIASTOLIC BLOOD PRESSURE 80-89 MM HG: ICD-10-PCS | Mod: HCNC,CPTII,S$GLB, | Performed by: INTERNAL MEDICINE

## 2023-09-18 PROCEDURE — 3079F DIAST BP 80-89 MM HG: CPT | Mod: HCNC,CPTII,S$GLB, | Performed by: INTERNAL MEDICINE

## 2023-09-18 PROCEDURE — 3008F BODY MASS INDEX DOCD: CPT | Mod: HCNC,CPTII,S$GLB, | Performed by: INTERNAL MEDICINE

## 2023-09-18 PROCEDURE — 3044F PR MOST RECENT HEMOGLOBIN A1C LEVEL <7.0%: ICD-10-PCS | Mod: HCNC,CPTII,S$GLB, | Performed by: INTERNAL MEDICINE

## 2023-09-18 PROCEDURE — 3008F PR BODY MASS INDEX (BMI) DOCUMENTED: ICD-10-PCS | Mod: HCNC,CPTII,S$GLB, | Performed by: INTERNAL MEDICINE

## 2023-09-18 PROCEDURE — 1160F PR REVIEW ALL MEDS BY PRESCRIBER/CLIN PHARMACIST DOCUMENTED: ICD-10-PCS | Mod: HCNC,CPTII,S$GLB, | Performed by: INTERNAL MEDICINE

## 2023-09-18 PROCEDURE — 99215 OFFICE O/P EST HI 40 MIN: CPT | Mod: HCNC,S$GLB,, | Performed by: INTERNAL MEDICINE

## 2023-09-18 PROCEDURE — 99999 PR PBB SHADOW E&M-EST. PATIENT-LVL III: ICD-10-PCS | Mod: PBBFAC,HCNC,, | Performed by: INTERNAL MEDICINE

## 2023-09-18 PROCEDURE — 3074F PR MOST RECENT SYSTOLIC BLOOD PRESSURE < 130 MM HG: ICD-10-PCS | Mod: HCNC,CPTII,S$GLB, | Performed by: INTERNAL MEDICINE

## 2023-09-18 PROCEDURE — 3044F HG A1C LEVEL LT 7.0%: CPT | Mod: HCNC,CPTII,S$GLB, | Performed by: INTERNAL MEDICINE

## 2023-09-18 PROCEDURE — 3074F SYST BP LT 130 MM HG: CPT | Mod: HCNC,CPTII,S$GLB, | Performed by: INTERNAL MEDICINE

## 2023-09-18 PROCEDURE — 1159F PR MEDICATION LIST DOCUMENTED IN MEDICAL RECORD: ICD-10-PCS | Mod: HCNC,CPTII,S$GLB, | Performed by: INTERNAL MEDICINE

## 2023-09-18 PROCEDURE — 1159F MED LIST DOCD IN RCRD: CPT | Mod: HCNC,CPTII,S$GLB, | Performed by: INTERNAL MEDICINE

## 2023-09-18 PROCEDURE — 99999 PR PBB SHADOW E&M-EST. PATIENT-LVL III: CPT | Mod: PBBFAC,HCNC,, | Performed by: INTERNAL MEDICINE

## 2023-09-18 PROCEDURE — 36415 COLL VENOUS BLD VENIPUNCTURE: CPT | Mod: HCNC | Performed by: INTERNAL MEDICINE

## 2023-09-18 PROCEDURE — 1160F RVW MEDS BY RX/DR IN RCRD: CPT | Mod: HCNC,CPTII,S$GLB, | Performed by: INTERNAL MEDICINE

## 2023-09-18 RX ORDER — GABAPENTIN 300 MG/1
CAPSULE ORAL
COMMUNITY
Start: 2023-09-02 | End: 2024-03-26

## 2023-09-18 NOTE — PROGRESS NOTES
Subjective:       Patient ID: Stevenson Robison Jr. is a 58 y.o. male.    Chief Complaint: Annual Exam (Needs clearance for pt to have teeth fixed) and Shortness of Breath (Started after having covid; took antibiotics from pulmonary, but still feels it is lingering.)    Here for annual exam and preop clearance    Having dental procedure. Pt denies , chronic cough, dizziness, orthopnea, PND, LE edema, personal or family hx of adverse reactions to anesthesia, Reports since being diagnosed with COVID 7  weeks prior he has palpitations, SOB at rest, CP at rest, ROBERTS, PND, orthopnea. All intermittently and without any consistency. Recent stress test normal for ischemia and with EF of 40% and no WMA. No known hx of afib. Dx with moderate to severe and not currently being tx.     He remains very anxious about his health currently his cardiac family Hx since his brother had MI last year.    09/06/2023   Bilateral sub 5 mm noncalcified pulmonary nodules.  Per Fleischner criteria optional follow-up with CT chest can be considered in the presence of high risk factors.       ### YORDAN ###  -Martín Tomas MD at 8/30/2023  moderate to severe obstructive sleep apnea    Has not yet started Tx.    ### HTN ###  HCTZ 25  8/5/22 BP remains elevated.  ADD amLODIPine      You referred pt for testing with Dr. Mckeon.   No test were performed by Dr. Mckeon. Pt wife would like a new neprhro provider recommendation       Simple renal cyst              Review of Systems   Constitutional:  Negative for appetite change, chills, fever and unexpected weight change.   HENT:  Negative for hearing loss, sore throat and trouble swallowing.    Eyes:  Negative for visual disturbance.   Respiratory:  Negative for cough, chest tightness and shortness of breath.    Cardiovascular:  Negative for chest pain and leg swelling.   Gastrointestinal:  Negative for abdominal pain, blood in stool, constipation, diarrhea, nausea and vomiting.   Endocrine: Negative for  "polydipsia and polyuria.   Genitourinary:  Negative for decreased urine volume, difficulty urinating, dysuria, frequency and urgency.   Musculoskeletal:  Negative for gait problem.   Skin:  Negative for rash.   Neurological:  Negative for dizziness and numbness.   Psychiatric/Behavioral:  The patient is not nervous/anxious.        Objective:      Vitals:    09/18/23 1419 09/18/23 1504   BP: (!) 140/78 126/80   BP Location: Left arm    Patient Position: Sitting    Pulse: 89    SpO2: 97%    Weight: 110.2 kg (242 lb 15.2 oz)    Height: 5' 9" (1.753 m)       Physical Exam  Vitals and nursing note reviewed.   Constitutional:       General: He is not in acute distress.     Appearance: Normal appearance. He is well-developed.   HENT:      Head: Normocephalic and atraumatic.      Mouth/Throat:      Pharynx: No oropharyngeal exudate.   Eyes:      General: No scleral icterus.     Conjunctiva/sclera: Conjunctivae normal.      Pupils: Pupils are equal, round, and reactive to light.   Neck:      Thyroid: No thyromegaly.   Cardiovascular:      Rate and Rhythm: Normal rate and regular rhythm.      Heart sounds: Normal heart sounds. No murmur heard.  Pulmonary:      Effort: Pulmonary effort is normal.      Breath sounds: Normal breath sounds. No wheezing or rales.   Abdominal:      General: There is no distension.   Musculoskeletal:         General: No tenderness.   Lymphadenopathy:      Cervical: No cervical adenopathy.   Skin:     General: Skin is warm and dry.   Neurological:      Mental Status: He is alert and oriented to person, place, and time.   Psychiatric:         Behavior: Behavior normal.         Assessment:       1. Decreased cardiac EF % 40 as of 9/2023    2. Benign essential hypertension    3. Severe obesity (BMI 35.0-39.9) with comorbidity    4. Moderate to severe YORDAN    5. Aortic atherosclerosis        Plan:       Stevenson was seen today for annual exam and shortness of breath.    Diagnoses and all orders for this " visit:    Decreased cardiac EF % 40 as of 9/2023  -     B-TYPE NATRIURETIC PEPTIDE; Future    Benign essential hypertension   Controlled and asymptomatic.  Continue current Rx regimen.    Severe obesity (BMI 35.0-39.9) with comorbidity    Moderate to severe YORDAN   Stressed adherence of and complications related to untreated YORDAN.    Aortic atherosclerosis   Tolerating statin. Continue this.       >40  minutes were spent today reviewing patient chart.  Much of today's visit was spent discussing with patient my chart review, their concerns, health maintenance, my assessment, and recommendations.      Ty Whatley MD  Internal Medicine-[facility]        Side effects of medication(s) were discussed in detail and patient voiced understanding.  Patient will call back for any issues or complications.

## 2023-09-19 ENCOUNTER — TELEPHONE (OUTPATIENT)
Dept: INTERNAL MEDICINE | Facility: CLINIC | Age: 58
End: 2023-09-19
Payer: MEDICARE

## 2023-09-19 NOTE — TELEPHONE ENCOUNTER
Left Voice message of the listed message and for Mr. Robison to call  for any questions      From Dr. Rodríguez    9/18/2023  4:59 PM CDT -----  No signs of heart failure. I'll be in touch after I hear from cardiology.

## 2023-09-19 NOTE — TELEPHONE ENCOUNTER
----- Message from Ty Rodríguez MD sent at 9/18/2023  4:59 PM CDT -----  No signs of heart failure. I'll be in touch after I hear from cardiology.

## 2023-09-21 ENCOUNTER — CLINICAL SUPPORT (OUTPATIENT)
Dept: ENDOSCOPY | Facility: HOSPITAL | Age: 58
End: 2023-09-21
Attending: INTERNAL MEDICINE
Payer: MEDICARE

## 2023-09-21 DIAGNOSIS — Z12.11 COLON CANCER SCREENING: ICD-10-CM

## 2023-09-21 NOTE — PLAN OF CARE
No available dates on 2nd floor to schedule colonoscopy. Pt to call back when January schedule opens.

## 2023-10-03 DIAGNOSIS — J20.9 SUBACUTE BRONCHITIS: ICD-10-CM

## 2023-10-03 RX ORDER — ALBUTEROL SULFATE 90 UG/1
AEROSOL, METERED RESPIRATORY (INHALATION)
Qty: 54 G | Refills: 3 | Status: SHIPPED | OUTPATIENT
Start: 2023-10-03

## 2023-10-03 NOTE — TELEPHONE ENCOUNTER
No care due was identified.  [facility] Embedded Care Due Messages. Reference number: [id number].   10/03/2023 12:52:33 AM CDT

## 2023-10-03 NOTE — TELEPHONE ENCOUNTER
Refill Routing Note   Medication(s) are not appropriate for processing by [facility] for the following reason(s):      New or recently adjusted medication  No active prescription written by provider    [facility] action(s):  Defer Care Due:  None identified     Medication Therapy Plan: Addressed on 8/14/23 from an encounter patient stated albuterol was less effective and would prefer ventonlin. I will pend for the ventolin quantity; per epic adherence he has filled an inhaler every 6 days since august      Appointments  past 12m or future 3m with PCP    Date Provider   Last Visit   9/18/2023 Ty Rodríguez MD   Next Visit   Visit date not found Ty Rodríguez MD   ED visits in past 90 days: 0        Note composed:7:25 AM 10/03/2023

## 2023-10-10 RX ORDER — HYDROCHLOROTHIAZIDE 25 MG/1
25 TABLET ORAL DAILY
Qty: 90 TABLET | Refills: 3 | Status: SHIPPED | OUTPATIENT
Start: 2023-10-10

## 2023-10-10 NOTE — TELEPHONE ENCOUNTER
No care due was identified.  [facility] Embedded Care Due Messages. Reference number: [id number].   10/10/2023 12:40:08 PM CDT

## 2023-10-10 NOTE — TELEPHONE ENCOUNTER
Refill Decision Note   Stevenson Robison  is requesting a refill authorization.  Brief Assessment and Rationale for Refill:  Approve     Medication Therapy Plan:         Comments:     Note composed:12:56 PM 10/10/2023

## 2023-11-17 DIAGNOSIS — E78.2 MIXED HYPERLIPIDEMIA: ICD-10-CM

## 2023-11-17 RX ORDER — ATORVASTATIN CALCIUM 40 MG/1
40 TABLET, FILM COATED ORAL
Qty: 90 TABLET | Refills: 3 | Status: SHIPPED | OUTPATIENT
Start: 2023-11-17

## 2023-11-17 NOTE — TELEPHONE ENCOUNTER
Refill Routing Note   Medication(s) are not appropriate for processing by [facility] for the following reason(s):        Required labs outdated    [facility] action(s):  Defer     Requires labs : Yes             Appointments  past 12m or future 3m with PCP    Date Provider   Last Visit   9/18/2023 Ty Rodríguez MD   Next Visit   Visit date not found Ty Rodríguez MD   ED visits in past 90 days: 0        Note composed:7:36 AM 11/17/2023

## 2023-11-17 NOTE — TELEPHONE ENCOUNTER
Care Due:                  Date            Visit Type   Department     Provider  --------------------------------------------------------------------------------                                EP -                              PRIMARY      [facility] INTERNAL  Last Visit: 09-      CARE ([facility])   MEDICINE       Ty Rodríguez  Next Visit: None Scheduled  None         None Found                                                            Last  Test          Frequency    Reason                     Performed    Due Date  --------------------------------------------------------------------------------    Lipid Panel.  12 months..  atorvastatin.............  08- 08-    [facility] Embedded Care Due Messages. Reference number: [id number].   11/17/2023 12:42:40 AM CST

## 2024-01-01 ENCOUNTER — HOSPITAL ENCOUNTER (EMERGENCY)
Facility: OTHER | Age: 59
Discharge: HOME | End: 2024-01-01
Attending: EMERGENCY MEDICINE
Payer: MEDICARE

## 2024-01-01 VITALS
WEIGHT: 243 LBS | OXYGEN SATURATION: 97 % | DIASTOLIC BLOOD PRESSURE: 62 MMHG | HEART RATE: 63 BPM | HEIGHT: 69 IN | RESPIRATION RATE: 18 BRPM | TEMPERATURE: 98 F | BODY MASS INDEX: 35.99 KG/M2 | SYSTOLIC BLOOD PRESSURE: 137 MMHG

## 2024-01-01 DIAGNOSIS — M54.9 BACK PAIN: ICD-10-CM

## 2024-01-01 DIAGNOSIS — M51.9 DISC DISORDER OF LUMBOSACRAL REGION: Primary | ICD-10-CM

## 2024-01-01 LAB
ANION GAP SERPL CALC-SCNC: 13 MMOL/L (ref 8–16)
BASOPHILS # BLD AUTO: 0.04 K/UL (ref 0–0.2)
BASOPHILS NFR BLD: 0.5 % (ref 0–1.9)
BUN SERPL-MCNC: 13 MG/DL (ref 6–20)
CALCIUM SERPL-MCNC: 9.7 MG/DL (ref 8.7–10.5)
CHLORIDE SERPL-SCNC: 100 MMOL/L (ref 95–110)
CO2 SERPL-SCNC: 25 MMOL/L (ref 23–29)
CREAT SERPL-MCNC: 1 MG/DL (ref 0.5–1.4)
CRP SERPL-MCNC: 83 MG/L (ref 0–8.2)
DIFFERENTIAL METHOD BLD: NORMAL
EOSINOPHIL # BLD AUTO: 0.1 K/UL (ref 0–0.5)
EOSINOPHIL NFR BLD: 0.8 % (ref 0–8)
ERYTHROCYTE [DISTWIDTH] IN BLOOD BY AUTOMATED COUNT: 12 % (ref 11.5–14.5)
EST. GFR  (NO RACE VARIABLE): >60 ML/MIN/1.73 M^2
GLUCOSE SERPL-MCNC: 122 MG/DL (ref 70–110)
HCT VFR BLD AUTO: 48.1 % (ref 40–54)
HGB BLD-MCNC: 16.3 G/DL (ref 14–18)
IMM GRANULOCYTES # BLD AUTO: 0.02 K/UL (ref 0–0.04)
IMM GRANULOCYTES NFR BLD AUTO: 0.2 % (ref 0–0.5)
LYMPHOCYTES # BLD AUTO: 2 K/UL (ref 1–4.8)
LYMPHOCYTES NFR BLD: 22.5 % (ref 18–48)
MCH RBC QN AUTO: 30.3 PG (ref 27–31)
MCHC RBC AUTO-ENTMCNC: 33.9 G/DL (ref 32–36)
MCV RBC AUTO: 89 FL (ref 82–98)
MONOCYTES # BLD AUTO: 0.9 K/UL (ref 0.3–1)
MONOCYTES NFR BLD: 10.7 % (ref 4–15)
NEUTROPHILS # BLD AUTO: 5.8 K/UL (ref 1.8–7.7)
NEUTROPHILS NFR BLD: 65.3 % (ref 38–73)
NRBC BLD-RTO: 0 /100 WBC
PLATELET # BLD AUTO: 272 K/UL (ref 150–450)
PMV BLD AUTO: 9.4 FL (ref 9.2–12.9)
POTASSIUM SERPL-SCNC: 3.6 MMOL/L (ref 3.5–5.1)
RBC # BLD AUTO: 5.38 M/UL (ref 4.6–6.2)
SODIUM SERPL-SCNC: 138 MMOL/L (ref 136–145)
WBC # BLD AUTO: 8.8 K/UL (ref 3.9–12.7)

## 2024-01-01 PROCEDURE — 63600175 PHARM REV CODE 636 W HCPCS: Mod: HCNC | Performed by: EMERGENCY MEDICINE

## 2024-01-01 PROCEDURE — 96375 TX/PRO/DX INJ NEW DRUG ADDON: CPT | Mod: HCNC

## 2024-01-01 PROCEDURE — 96372 THER/PROPH/DIAG INJ SC/IM: CPT | Mod: 59 | Performed by: EMERGENCY MEDICINE

## 2024-01-01 PROCEDURE — 85025 COMPLETE CBC W/AUTO DIFF WBC: CPT | Mod: HCNC | Performed by: EMERGENCY MEDICINE

## 2024-01-01 PROCEDURE — 96374 THER/PROPH/DIAG INJ IV PUSH: CPT | Mod: HCNC

## 2024-01-01 PROCEDURE — 80048 BASIC METABOLIC PNL TOTAL CA: CPT | Mod: HCNC | Performed by: EMERGENCY MEDICINE

## 2024-01-01 PROCEDURE — 96372 THER/PROPH/DIAG INJ SC/IM: CPT | Performed by: PHYSICIAN ASSISTANT

## 2024-01-01 PROCEDURE — 25000003 PHARM REV CODE 250: Mod: HCNC | Performed by: EMERGENCY MEDICINE

## 2024-01-01 PROCEDURE — 63600175 PHARM REV CODE 636 W HCPCS: Mod: HCNC | Performed by: PHYSICIAN ASSISTANT

## 2024-01-01 PROCEDURE — 99284 EMERGENCY DEPT VISIT MOD MDM: CPT | Mod: 25,HCNC

## 2024-01-01 PROCEDURE — 86140 C-REACTIVE PROTEIN: CPT | Mod: HCNC | Performed by: EMERGENCY MEDICINE

## 2024-01-01 RX ORDER — DIAZEPAM 5 MG/1
5 TABLET ORAL EVERY 12 HOURS PRN
Qty: 6 TABLET | Refills: 0 | Status: SHIPPED | OUTPATIENT
Start: 2024-01-01

## 2024-01-01 RX ORDER — KETOROLAC TROMETHAMINE 30 MG/ML
30 INJECTION, SOLUTION INTRAMUSCULAR; INTRAVENOUS
Status: COMPLETED | OUTPATIENT
Start: 2024-01-01 | End: 2024-01-01

## 2024-01-01 RX ORDER — LIDOCAINE 50 MG/G
1 PATCH TOPICAL
Status: DISCONTINUED | OUTPATIENT
Start: 2024-01-01 | End: 2024-01-02 | Stop reason: HOSPADM

## 2024-01-01 RX ORDER — DIAZEPAM 5 MG/1
10 TABLET ORAL
Status: COMPLETED | OUTPATIENT
Start: 2024-01-01 | End: 2024-01-01

## 2024-01-01 RX ORDER — LIDOCAINE 50 MG/G
2 PATCH TOPICAL
Status: DISCONTINUED | OUTPATIENT
Start: 2024-01-01 | End: 2024-01-01

## 2024-01-01 RX ORDER — NAPROXEN 500 MG/1
500 TABLET ORAL 2 TIMES DAILY PRN
Qty: 30 TABLET | Refills: 0 | Status: SHIPPED | OUTPATIENT
Start: 2024-01-01

## 2024-01-01 RX ORDER — KETOROLAC TROMETHAMINE 30 MG/ML
10 INJECTION, SOLUTION INTRAMUSCULAR; INTRAVENOUS
Status: DISCONTINUED | OUTPATIENT
Start: 2024-01-01 | End: 2024-01-02 | Stop reason: HOSPADM

## 2024-01-01 RX ORDER — HYDROMORPHONE HYDROCHLORIDE 1 MG/ML
1 INJECTION, SOLUTION INTRAMUSCULAR; INTRAVENOUS; SUBCUTANEOUS
Status: COMPLETED | OUTPATIENT
Start: 2024-01-01 | End: 2024-01-01

## 2024-01-01 RX ORDER — NAPROXEN 500 MG/1
500 TABLET ORAL 2 TIMES DAILY PRN
Qty: 30 TABLET | Refills: 0 | Status: SHIPPED | OUTPATIENT
Start: 2024-01-01 | End: 2024-01-01

## 2024-01-01 RX ORDER — ORPHENADRINE CITRATE 30 MG/ML
60 INJECTION INTRAMUSCULAR; INTRAVENOUS
Status: COMPLETED | OUTPATIENT
Start: 2024-01-01 | End: 2024-01-01

## 2024-01-01 RX ORDER — LIDOCAINE 50 MG/G
1 PATCH TOPICAL DAILY
Qty: 15 PATCH | Refills: 0 | Status: SHIPPED | OUTPATIENT
Start: 2024-01-01

## 2024-01-01 RX ORDER — DEXAMETHASONE SODIUM PHOSPHATE 4 MG/ML
8 INJECTION, SOLUTION INTRA-ARTICULAR; INTRALESIONAL; INTRAMUSCULAR; INTRAVENOUS; SOFT TISSUE
Status: COMPLETED | OUTPATIENT
Start: 2024-01-01 | End: 2024-01-01

## 2024-01-01 RX ORDER — OXYCODONE AND ACETAMINOPHEN 7.5; 325 MG/1; MG/1
1 TABLET ORAL EVERY 6 HOURS PRN
Qty: 12 TABLET | Refills: 0 | Status: SHIPPED | OUTPATIENT
Start: 2024-01-01 | End: 2024-01-04

## 2024-01-01 RX ADMIN — ORPHENADRINE CITRATE 60 MG: 60 INJECTION INTRAMUSCULAR; INTRAVENOUS at 05:01

## 2024-01-01 RX ADMIN — KETOROLAC TROMETHAMINE 30 MG: 30 INJECTION, SOLUTION INTRAMUSCULAR at 04:01

## 2024-01-01 RX ADMIN — DEXAMETHASONE SODIUM PHOSPHATE 8 MG: 4 INJECTION INTRA-ARTICULAR; INTRALESIONAL; INTRAMUSCULAR; INTRAVENOUS; SOFT TISSUE at 10:01

## 2024-01-01 RX ADMIN — HYDROMORPHONE HYDROCHLORIDE 1 MG: 0.5 INJECTION, SOLUTION INTRAMUSCULAR; INTRAVENOUS; SUBCUTANEOUS at 05:01

## 2024-01-01 RX ADMIN — DIAZEPAM 10 MG: 5 TABLET ORAL at 08:01

## 2024-01-01 RX ADMIN — LIDOCAINE 1 PATCH: 50 PATCH CUTANEOUS at 10:01

## 2024-01-01 NOTE — ED TRIAGE NOTES
States he had a nerve ablation on lower back about 3 weeks ago for chronic back pain with sciatica. States initially had relief of pain but since Friday has had increased pain. No recent injury. States pain radiates down right leg and is severe to the point where he can't bear weight. Denies fever. Taking hydrocodone 10/325 and flexeril without relief.

## 2024-01-01 NOTE — ED PROVIDER NOTES
Encounter Date: 1/1/2024    SCRIBE #1 NOTE: I, Los Jenkins, am scribing for, and in the presence of,  Gissel Bal MD. I have scribed the entire note.       History     Chief Complaint   Patient presents with    Back Pain     Low back pain since Friday with worsening over the weekend. He reports pain shooting down to the bottom of his right leg. Hx of back pain with and RFA three weeks ago. No loss of bowel or bladder     Time seen by provider: 4:37 PM    This is a 58 y.o. male who presents with complaint of right lower back pain radiating down the posterior thigh. Patient has a history of chronic back pain which he sees pain management for, and his last injection was three weeks ago. He awoke to his current pain three days ago and has since developed associated numbness to the area. Patient  cannot recall any inciting events. He has been taking Flexeril and hydrocodone-acetaminophen as recently as three hours ago. Patient denies any fever or incontinence. PSHx also includes laminectomy. This is the extent of the patient's complaints at this time.    The history is provided by the patient.     Review of patient's allergies indicates:   Allergen Reactions    Advair diskus [fluticasone propion-salmeterol]      Hives     Other omega-3s Hives     Pt reports allergic to either an anti-inflammatory or antibiotic. PT unsure of name    Penicillins      Since childhood  Hives      Past Medical History:   Diagnosis Date    Asthma     Hypertension      Past Surgical History:   Procedure Laterality Date    BACK SURGERY      COLONOSCOPY N/A 02/23/2017    Procedure: COLONOSCOPY;  Surgeon: Wil Dimas MD;  Location: [facility] ([address] [place] [address]);  Service: Endoscopy;  Laterality: N/A;    decompression neck  2005    HERNIA REPAIR  2008    NASAL SEPTUM SURGERY  2005    SPINE SURGERY       Family History   Problem Relation Age of Onset    Lung disease Mother     Heart disease Father     Heart attack Father     Melanoma Father      No Known Problems Sister     Hypertension Brother     Melanoma Paternal Grandfather      Social History     Tobacco Use    Smoking status: Former    Smokeless tobacco: Former   Substance Use Topics    Alcohol use: No    Drug use: Yes     Types: Hydrocodone     Comment: prn prescription pain     Review of Systems    Physical Exam     Initial Vitals [01/01/24 1614]   BP Pulse Resp Temp SpO2   (!) 172/108 (!) 111 18 97.9 °F (36.6 °C) (!) 94 %      MAP       --         Physical Exam    Nursing note and vitals reviewed.  Constitutional: He appears well-developed and well-nourished. He is not diaphoretic. No distress.   Appears uncomfortable.   HENT:   Head: Normocephalic and atraumatic.   Eyes: Conjunctivae are normal. No scleral icterus.   Neck: No JVD present.   Normal range of motion.  Pulmonary/Chest: Effort normal. No tachypnea.   Musculoskeletal:      Cervical back: Normal range of motion.      Comments: Tenderness over the sacrum, worse on the right side, associated with some point tenderness in the right SI region.      Neurological: He is alert and oriented to person, place, and time. No sensory deficit.   Positive straight leg on right. Positive contralateral leg raise. Intact S1 nerve function.    Skin: Skin is warm. Capillary refill takes less than 2 seconds. No rash noted. No erythema.   No skin change or discoloration. No rash   Psychiatric: He has a normal mood and affect. His behavior is normal.         ED Course   Procedures  Labs Reviewed   BASIC METABOLIC PANEL - Abnormal; Notable for the following components:       Result Value    Glucose 122 (*)     All other components within normal limits   C-REACTIVE PROTEIN - Abnormal; Notable for the following components:    CRP 83.0 (*)     All other components within normal limits   CBC W/ AUTO DIFFERENTIAL          Imaging Results              MRI Lumbar Spine Without Contrast (Final result)  Result time 01/01/24 21:55:33   Procedure changed from MRI Lumbar  Spine With Contrast     Final result by Dennis Rodrigues MD (01/01/24 21:55:33)                   Impression:      Susceptibility weighted artifact within the lower lumbar spine subcutaneous, in keeping with previous lumbar spine surgery.    No cauda equina type lesion within the lumbar spine.    Diffuse disc bulge with superimposed central disc protrusion at the L4-L5 level resulting in mild narrowing of the spinal canal and mild to moderate neural foraminal narrowing.    Additional multilevel degenerative changes as above.  There is mild progression compared to exam dated 03/11/2015.      Electronically signed by: Dennis Rodrigues MD  Date:    01/01/2024  Time:    21:55               Narrative:    EXAMINATION:  MRI LUMBAR SPINE WITHOUT CONTRAST    CLINICAL HISTORY:  Worsening back pain.    TECHNIQUE:  Multiplanar, multisequence MR images were acquired from the thoracolumbar junction to the sacrum without the administration of contrast.    COMPARISON:  X-ray lumbar spine dated 11/01/2024.    MRI of the lumbar spine dated 03/11/2015.    FINDINGS:  There is susceptibility weighted artifact within the lower lumbar subcutaneous tissues, in keeping with previous surgery.    The lumbar alignment is within normal limits.  The vertebral body heights are maintained.  The bone marrow signal is within normal limits.    The conus terminates at the level of L1.  The cauda equina nerve roots are within normal limits.    There is multilevel disc desiccation.  There is hypertrophy of the posterior elements.  Evaluation of the individual disc levels reveals the following:    L1-L2, there is disc desiccation.  The spinal canal and neural foramina are unremarkable.    L2-L3, there is a disc bulge along with facet hypertrophy and ligamentum flavum hypertrophy.  The spinal canal is within normal limits.  The neural foramina is unremarkable.    L3-L4, there is diffuse disc bulge along with facet hypertrophy and ligamentum flavum hypertrophy.   There is narrowing the lateral recesses.  The spinal canal remains widely patent.  There is mild bilateral neural foraminal narrowing.    L4-L5, there is diffuse disc bulge along with facet hypertrophy and ligamentum flavum hypertrophy.  The superimposed central disc protrusion.  The posterior annular fissure.  There is mild narrowing of the spinal canal.  There is moderate right and mild left neural foraminal narrowing.  There is fluid within the facet joints.    L5-S1, there is diffuse disc bulge along with facet hypertrophy and ligamentum flavum hypertrophy.  The spinal canal is within normal limits.  There is mild bilateral neural foraminal narrowing.    There is atrophy of the medial paraspinous muscles.  There are simple cyst in the left kidney.  The remainder of the paraspinal soft tissues are within normal limits.                                       X-Ray Lumbar Spine Ap And Lateral (Final result)  Result time 01/01/24 19:49:39      Final result by Rosendo Santana MD (01/01/24 19:49:39)                   Impression:      1. No acute displaced fracture or dislocation of the lumbar spine noting degenerative changes.      Electronically signed by: Rosendo Santana MD  Date:    01/01/2024  Time:    19:49               Narrative:    EXAMINATION:  XR LUMBAR SPINE AP AND LATERAL    CLINICAL HISTORY:  back pain;    TECHNIQUE:  AP, lateral and spot images were performed of the lumbar spine.    COMPARISON:  06/17/2019    FINDINGS:  Three views lumbar spine.    Lateral imaging demonstrates adequate alignment of the lumbar spine without significant vertebral body height loss.  There is disc space height loss primarily involving L5-S1.  Disc space height loss and disc degenerative disease is also noted involving T11-T12.  There is lower lumbar facet arthropathy.  The sacral segments are aligned.  AP spinal alignment is unremarkable.  The sacroiliac joints are intact.  Surgical change noted of the left inguinal  region.  There is moderate stool in the colon.                                       X-Ray Chest AP Portable (Final result)  Result time 01/01/24 19:47:13      Final result by Rosendo Santana MD (01/01/24 19:47:13)                   Impression:      1. Interstitial findings are accentuated by habitus and shallow inspiratory effort.  No large focal consolidation.      Electronically signed by: Rosendo Santana MD  Date:    01/01/2024  Time:    19:47               Narrative:    EXAMINATION:  XR CHEST AP PORTABLE    CLINICAL HISTORY:  Dorsalgia, unspecified    TECHNIQUE:  Single frontal view of the chest was performed.    COMPARISON:  11/24/2017    FINDINGS:  The cardiomediastinal silhouette is not enlarged noting magnification by technique..  There is no pleural effusion.  The trachea is midline.  The lungs are symmetrically expanded bilaterally with coarse interstitial attenuation, accentuated by habitus and shallow inspiratory effort..  No large focal consolidation seen.  There is no pneumothorax.  The osseous structures are remarkable for degenerative change..                                       X-Ray Abdomen AP 1 View (KUB) (Final result)  Result time 01/01/24 19:46:13      Final result by Rosendo Santana MD (01/01/24 19:46:13)                   Impression:      1. Moderate stool in the colon, may reflect constipation.  No high-grade obstruction.      Electronically signed by: Rosendo Santana MD  Date:    01/01/2024  Time:    19:46               Narrative:    EXAMINATION:  XR ABDOMEN AP 1 VIEW    CLINICAL HISTORY:  Dorsalgia, unspecified    TECHNIQUE:  AP View(s) of the abdomen was performed.    COMPARISON:  None    FINDINGS:  Three views abdomen supine.    Air and stool is seen within the large bowel and projected over the rectum.  No focally dilated small bowel loops.  There are no calcifications to suggest nephrolithiasis or cholelithiasis.  No pneumatosis.  The osseous structures are intact.  Surgical  change noted of hernia repair.                                       Medications   ketorolac injection 30 mg (30 mg Intramuscular Given 1/1/24 1655)   HYDROmorphone injection 1 mg (1 mg Intravenous Given 1/1/24 1717)   orphenadrine injection 60 mg (60 mg Intramuscular Given 1/1/24 1716)   diazePAM tablet 10 mg (10 mg Oral Given 1/1/24 2021)   dexAMETHasone injection 8 mg (8 mg Intravenous Given 1/1/24 2255)     Medical Decision Making  Initial impression: 58 year old male with chronic back pain, in pain management program with recent radiofrequency ablation three weeks ago, now with severe pain since Friday to the extent that the patient has difficulty standing or ambulating. No fever or signs or symptoms of infection. No trauma. Plan for multimodal pain treatment, check basic labs due to severity of symptoms.     Differential diagnosis: Muscular pain, herniated disc, spine fracture, intra-abdominal causes and urinary tract infection, dehydration.     Amount and/or Complexity of Data Reviewed  Labs: ordered.  Radiology: ordered.  Discussion of management or test interpretation with external provider(s): Elevated CRP and more severe pain than patient has experienced previously. MRI obtained to r/o abscess or collection following recent instrumentation.     Risk  Prescription drug management.            Scribe Attestation:   Scribe #1: I performed the above scribed service and the documentation accurately describes the services I performed. I attest to the accuracy of the note.    Physician Attestation for Scribe: I, Gissel Bal MD, reviewed documentation as scribed in my presence, which is both accurate and complete.                         Patient improved with treatment in the emergency department and comfortable going home. Discussed reasons to return and importance of followup.  Patient understands that the emergency visit today is primarily to address immediate concerns and to rule out emergent cause of symptoms  and that they may require further workup and evaluation as an outpatient. All questions addressed and patient given discharge instructions and followup information.   Advised patient of reasons to return and to call pain management physician tomorrow.  Give short rx for oxycodone and valium, advised not to take with hydrocodone or other muscle relaxers.       Clinical Impression:  Final diagnoses:  [M54.9] Back pain  [M51.9] Disc disorder of lumbosacral region (Primary)          ED Disposition Condition    Discharge Stable          ED Prescriptions       Medication Sig Dispense Start Date End Date Auth. Provider    oxyCODONE-acetaminophen (PERCOCET) 7.5-325 mg per tablet () Take 1 tablet by mouth every 6 (six) hours as needed for Pain. 12 tablet 2024 Gissel Bal MD    diazePAM (VALIUM) 5 MG tablet Take 1 tablet (5 mg total) by mouth every 12 (twelve) hours as needed (back pain/muscle spasm). 6 tablet 2024 -- Gissel Bal MD    LIDOcaine (LIDODERM) 5 % Place 1 patch onto the skin once daily. Remove & Discard patch within 12 hours or as directed by MD 15 patch 2024 -- Gissel Bal MD    naproxen (NAPROSYN) 500 MG tablet  (Status: Discontinued) Take 1 tablet (500 mg total) by mouth 2 (two) times daily as needed (pain). Take with food 30 tablet 2024 Gissel Bal MD    naproxen (NAPROSYN) 500 MG tablet Take 1 tablet (500 mg total) by mouth 2 (two) times daily as needed (pain). Take with food 30 tablet 2024 -- Gissel Bal MD          Follow-up Information       Follow up With Specialties Details Why Contact Info    Ty Rodríguez MD Internal Medicine   [address] [facility]  [address]  [facility] [id number] [phone]      Leonid Ponce MD Anesthesiology, Pain Medicine Call in 1 day  [address] [id number] [phone]               Gissel Bal MD  [phone]

## 2024-01-02 NOTE — DISCHARGE INSTRUCTIONS
Do not take hydrocodone and oxycodone same time.  Take the oxycodone for the next few days and contact your pain management doctor tomorrow.  Return to the hospital for new worsening symptoms or pain, fever.

## 2024-01-17 NOTE — FIRST PROVIDER EVALUATION
Emergency Department TeleTriage Encounter Note      CHIEF COMPLAINT    Chief Complaint   Patient presents with    Back Pain     Low back pain since Friday with worsening over the weekend. He reports pain shooting down to the bottom of his right leg. Hx of back pain with and RFA three weeks ago. No loss of bowel or bladder       VITAL SIGNS   Initial Vitals [01/01/24 1614]   BP Pulse Resp Temp SpO2   (!) 172/108 (!) 111 18 97.9 °F (36.6 °C) (!) 94 %      MAP       --            ALLERGIES    Review of patient's allergies indicates:   Allergen Reactions    Advair diskus [fluticasone propion-salmeterol]      Hives     Other omega-3s Hives     Pt reports allergic to either an anti-inflammatory or antibiotic. PT unsure of name    Penicillins      Since childhood  Hives        PROVIDER TRIAGE NOTE  Patient with history of chronic back pain and RFA 3 weeks ago presenting with gradually increasing pain in the low back radiating to the lower extremities. No urinary/fecal incontinence. No focal weakness. No recent trauma.       ORDERS  Labs Reviewed - No data to display    ED Orders (720h ago, onward)      None              Virtual Visit Note: The provider triage portion of this emergency department evaluation and documentation was performed via [email or web address], a HIPAA-compliant telemedicine application, in concert with a tele-presenter in the room. A face to face patient evaluation with one of my colleagues will occur once the patient is placed in an emergency department room.      DISCLAIMER: This note was prepared with [employer] voice recognition transcription software. Garbled syntax, mangled pronouns, and other bizarre constructions may be attributed to that software system.    
[de-identified] : 58 year old RHD male presents today with acute on chronic right shoulder pain x 2 months. Patient has been having painful limited ROM for the past 10 years, started to have pain in the shoulder after he [facility]. He returned to exercising in hopes of it getting better but it became worse. The pain is brought on with crossbody and taking off pullover shirt. Ice and voltaren cream has not been helpful. Patient was seen in 2017 for bilateral shoulder impingement.   The patient's past medical history, past surgical history, medications and allergies were reviewed by me today with the patient and documented accordingly. In addition, the patient's family and social history, which were noncontributory to this visit, were reviewed also.

## 2024-03-04 ENCOUNTER — TELEPHONE (OUTPATIENT)
Dept: CARDIOLOGY | Facility: CLINIC | Age: 59
End: 2024-03-04
Payer: MEDICARE

## 2024-03-04 NOTE — TELEPHONE ENCOUNTER
----- Message from Darlene Hatfield sent at 3/4/2024  9:56 AM CST -----  Regarding: Clearance  Patient calling to have clearance resent to [phone]. Thank you Darlene

## 2024-03-05 NOTE — PROGRESS NOTES
This patient is free to proceed with dental implants without further cardiovascular evaluation.  If any sedation is planned, please note that he has diagnosed sleep apnea.

## 2024-03-15 ENCOUNTER — TELEPHONE (OUTPATIENT)
Dept: CARDIOLOGY | Facility: CLINIC | Age: 59
End: 2024-03-15
Payer: MEDICARE

## 2024-03-15 NOTE — TELEPHONE ENCOUNTER
Pt c/o has been getting heartburn, and also acid reflux back into throat at night. He has been taking up to 2 pantoprazole and it helped but just for a few hours. Pain returns when he drinks water or else. Has been relieved taking Sod Bicarb + water. Advised pt on not taking 2 pantoprazole at once due to too much med and not for immediate relief of symptoms anyway and watch out w. Sodium Bicarb. He should contact PCP to address or refer to GI as indicated. He verbalized understanding.   Message forwarded to dr Rodríguez's team.

## 2024-03-22 ENCOUNTER — TELEPHONE (OUTPATIENT)
Dept: INTERNAL MEDICINE | Facility: CLINIC | Age: 59
End: 2024-03-22
Payer: MEDICARE

## 2024-03-22 VITALS — DIASTOLIC BLOOD PRESSURE: 82 MMHG | SYSTOLIC BLOOD PRESSURE: 147 MMHG

## 2024-03-22 NOTE — TELEPHONE ENCOUNTER
Called and spoke to ezio. Informed of secure message from Dr. Rodríguez to continue his antibiotics and that BP is a response to infection, but will return to normal in next week or two.

## 2024-03-22 NOTE — TELEPHONE ENCOUNTER
----- Message from Krystyna Stevenson sent at 3/21/2024  3:27 PM CDT -----  Regarding: urgent call back  Name of caller: Karen'       What is the requesting detail: states pt is having a severe allergic reaction to antibiotics. Wondering if they should discontinue use.please advise       Can the clinic reply by [facility]:       What number to call back: [phone]  or [phone]

## 2024-03-22 NOTE — TELEPHONE ENCOUNTER
Returned call to ezio and Mr. Kathleenon. States severe reaction is the elevated BP.  States placed on Clindamycin 300 mg every 8 hrs by dentist this past Monday. States BP has been elevated all week . Numbers given on Wednesday 154/90, Thursday 157/100 and Now 147/82. Wondering should they continue the medication

## 2024-03-26 ENCOUNTER — TELEPHONE (OUTPATIENT)
Dept: INTERNAL MEDICINE | Facility: CLINIC | Age: 59
End: 2024-03-26

## 2024-03-26 ENCOUNTER — OFFICE VISIT (OUTPATIENT)
Dept: INTERNAL MEDICINE | Facility: CLINIC | Age: 59
End: 2024-03-26
Payer: MEDICARE

## 2024-03-26 ENCOUNTER — PATIENT MESSAGE (OUTPATIENT)
Dept: INTERNAL MEDICINE | Facility: CLINIC | Age: 59
End: 2024-03-26

## 2024-03-26 VITALS
WEIGHT: 249.31 LBS | BODY MASS INDEX: 36.93 KG/M2 | OXYGEN SATURATION: 94 % | SYSTOLIC BLOOD PRESSURE: 140 MMHG | HEART RATE: 78 BPM | HEIGHT: 69 IN | DIASTOLIC BLOOD PRESSURE: 82 MMHG

## 2024-03-26 DIAGNOSIS — I10 BENIGN ESSENTIAL HYPERTENSION: ICD-10-CM

## 2024-03-26 DIAGNOSIS — M54.16 LUMBAR BACK PAIN WITH RADICULOPATHY AFFECTING RIGHT LOWER EXTREMITY: ICD-10-CM

## 2024-03-26 DIAGNOSIS — K21.9 GASTROESOPHAGEAL REFLUX DISEASE, UNSPECIFIED WHETHER ESOPHAGITIS PRESENT: Primary | ICD-10-CM

## 2024-03-26 DIAGNOSIS — G47.33 OSA (OBSTRUCTIVE SLEEP APNEA): ICD-10-CM

## 2024-03-26 DIAGNOSIS — L25.9 CONTACT DERMATITIS, UNSPECIFIED CONTACT DERMATITIS TYPE, UNSPECIFIED TRIGGER: ICD-10-CM

## 2024-03-26 PROCEDURE — 3079F DIAST BP 80-89 MM HG: CPT | Mod: HCNC,CPTII,S$GLB,

## 2024-03-26 PROCEDURE — 99214 OFFICE O/P EST MOD 30 MIN: CPT | Mod: HCNC,S$GLB,,

## 2024-03-26 PROCEDURE — 1159F MED LIST DOCD IN RCRD: CPT | Mod: HCNC,CPTII,S$GLB,

## 2024-03-26 PROCEDURE — 3077F SYST BP >= 140 MM HG: CPT | Mod: HCNC,CPTII,S$GLB,

## 2024-03-26 PROCEDURE — 99999 PR PBB SHADOW E&M-EST. PATIENT-LVL III: CPT | Mod: PBBFAC,HCNC,,

## 2024-03-26 PROCEDURE — 3008F BODY MASS INDEX DOCD: CPT | Mod: HCNC,CPTII,S$GLB,

## 2024-03-26 RX ORDER — TRIAMCINOLONE ACETONIDE 1 MG/G
OINTMENT TOPICAL 2 TIMES DAILY
Qty: 80 G | Refills: 0 | Status: SHIPPED | OUTPATIENT
Start: 2024-03-26

## 2024-03-26 RX ORDER — CLINDAMYCIN HYDROCHLORIDE 300 MG/1
300 CAPSULE ORAL EVERY 8 HOURS
COMMUNITY
Start: 2024-03-18

## 2024-03-26 NOTE — PROGRESS NOTES
CHIEF COMPLAINT     Chief Complaint   Patient presents with    Heartburn    Rash       HPI     Stevenson Robison Jr. is a 59 y.o. male who presents for reflux today.    PCP is Ty Rodríguez MD, patient is new to me. Reports acid reflux that has been extreme. He has woken up in the middle of the night with reflux that ws in his mouth and coming out of his nose. Happens when he takes his medicine, does not happen with eating. He takes amlodipine and atorvastatin together at night. This is when it occurs. Will have pt hold atorvastatin one night and then hold amlodipine one night to see if he has an improvement in his symptoms with either.    Rash x 2 weeks on r hip. No known cause. Is improving. Does not itch but burns in the shower.    BP has been higher than usual. Pt has been on Clinda for a dental infection and so stopped taking his naproxen. He has been in a lot of pain with his back. This is the most likely cause for the elevated BP. Instructed pt to monitor. Will restart naproxen when finished with clinda.    Pt reports fatigue and memory issues. Pt has YORDAN and has not been using CPAP d/t intolerance. Pt instructed to f.u with sleep med to see if machine can be adjusted.  Past Medical History:  Past Medical History:   Diagnosis Date    Asthma     Hypertension        Home Medications:  Prior to Admission medications    Medication Sig Start Date End Date Taking? Authorizing Provider   albuterol (PROVENTIL/VENTOLIN HFA) 90 mcg/actuation inhaler INHALE 2 PUFFS INTO THE LUNGS EVERY 6 HOURS AS NEEDED FOR WHEEZE 10/3/23  Yes Ty Rodríguez MD   amLODIPine (NORVASC) 5 MG tablet Take 1 tablet (5 mg total) by mouth once daily. 8/17/23  Yes Ty Rodríguez MD   aspirin 81 MG Chew Take 81 mg by mouth once daily.   Yes Provider, Historical   atorvastatin (LIPITOR) 40 MG tablet TAKE 1 TABLET BY MOUTH EVERY DAY 11/17/23  Yes Ty Rodríguez MD   clindamycin (CLEOCIN) 300 MG capsule Take 300 mg by mouth  every 8 (eight) hours. 3/18/24  Yes Provider, Historical   cyclobenzaprine (FLEXERIL) 10 MG tablet Take by mouth. 2/24/23  Yes Provider, Historical   hydroCHLOROthiazide (HYDRODIURIL) 25 MG tablet Take 1 tablet (25 mg total) by mouth once daily. 10/10/23  Yes Ty Rodríguez MD   hydrocodone-acetaminophen 10-325mg (NORCO)  mg Tab Take 1 tablet by mouth 3 (three) times daily as needed.   Yes Provider, Historical   naproxen (NAPROSYN) 500 MG tablet Take 1 tablet (500 mg total) by mouth 2 (two) times daily as needed (pain). Take with food  Patient not taking: Reported on 3/26/2024 1/1/24   Gissel Bal MD   diazePAM (VALIUM) 5 MG tablet Take 1 tablet (5 mg total) by mouth every 12 (twelve) hours as needed (back pain/muscle spasm). 1/1/24 3/26/24  Gissel Bal MD   gabapentin (NEURONTIN) 100 MG capsule Take by mouth. 2/24/23 3/26/24  Provider, Historical   gabapentin (NEURONTIN) 300 MG capsule TAKE 1 CAPSULE BY MOUTH EVERY NIGHT FOR 30 DAYS 9/2/23 3/26/24  Provider, Historical   levocetirizine (XYZAL) 5 MG tablet  3/10/22 3/26/24  Provider, Historical   LIDOcaine (LIDODERM) 5 % Place 1 patch onto the skin once daily. Remove & Discard patch within 12 hours or as directed by MD 1/1/24 3/26/24  Gissel Bal MD   pantoprazole (PROTONIX) 40 MG tablet Take 1 tablet (40 mg total) by mouth before breakfast. 12/16/22 3/26/24  Ty Rodríguez MD   triamcinolone acetonide 0.1% (KENALOG) 0.1 % cream Apply topically 2 (two) times daily. for 10 days 3/17/23 3/26/24  Ty Rodríguez MD       Review of Systems:  Review of Systems   Constitutional:  Positive for fatigue.   Respiratory: Negative.     Cardiovascular: Negative.    Skin:  Positive for rash.       Health Maintainence:   Immunizations:  Health Maintenance         Date Due Completion Date    Pneumococcal Vaccines (Age 0-64) (1 of 2 - PCV) Never done ---    Shingles Vaccine (1 of 2) Never done ---    Colorectal Cancer Screening 02/23/2022 2/23/2017     "PROSTATE-SPECIFIC ANTIGEN 06/15/2023 6/15/2022    COVID-19 Vaccine (3 - 2023-24 season) 09/01/2023 8/20/2021    High Dose Statin 03/26/2025 3/26/2024    Hemoglobin A1c (Diabetic Prevention Screening) 07/06/2026 7/6/2023    Lipid Panel 08/05/2027 8/5/2022    TETANUS VACCINE 10/18/2028 10/18/2018             PHYSICAL EXAM     BP (!) 140/82   Pulse 78   Ht 5' 9" (1.753 m)   Wt 113.1 kg (249 lb 5.4 oz)   SpO2 (!) 94%   BMI 36.82 kg/m²     Physical Exam  Vitals reviewed.   Constitutional:       Appearance: He is well-developed.   HENT:      Head: Normocephalic and atraumatic.   Eyes:      Conjunctiva/sclera: Conjunctivae normal.   Cardiovascular:      Rate and Rhythm: Normal rate.   Pulmonary:      Effort: Pulmonary effort is normal. No respiratory distress.   Skin:     General: Skin is warm and dry.      Findings: Rash present.          Neurological:      Mental Status: He is alert and oriented to person, place, and time.      Coordination: Coordination normal.   Psychiatric:         Behavior: Behavior normal.         LABS     Lab Results   Component Value Date    HGBA1C 5.4 03/17/2023     CMP  Sodium   Date Value Ref Range Status   01/01/2024 138 136 - 145 mmol/L Final     Potassium   Date Value Ref Range Status   01/01/2024 3.6 3.5 - 5.1 mmol/L Final     Chloride   Date Value Ref Range Status   01/01/2024 100 95 - 110 mmol/L Final     CO2   Date Value Ref Range Status   01/01/2024 25 23 - 29 mmol/L Final     Glucose   Date Value Ref Range Status   01/01/2024 122 (H) 70 - 110 mg/dL Final     BUN   Date Value Ref Range Status   01/01/2024 13 6 - 20 mg/dL Final     Creatinine   Date Value Ref Range Status   01/01/2024 1.0 0.5 - 1.4 mg/dL Final     Calcium   Date Value Ref Range Status   01/01/2024 9.7 8.7 - 10.5 mg/dL Final     Total Protein   Date Value Ref Range Status   03/17/2023 7.6 6.0 - 8.4 g/dL Final     Albumin   Date Value Ref Range Status   03/17/2023 4.2 3.5 - 5.2 g/dL Final     Total Bilirubin   Date " Value Ref Range Status   03/17/2023 0.9 0.1 - 1.0 mg/dL Final     Comment:     For infants and newborns, interpretation of results should be based  on gestational age, weight and in agreement with clinical  observations.    Premature Infant recommended reference ranges:  Up to 24 hours.............<8.0 mg/dL  Up to 48 hours............<12.0 mg/dL  3-5 days..................<15.0 mg/dL  6-29 days.................<15.0 mg/dL       Alkaline Phosphatase   Date Value Ref Range Status   03/17/2023 60 55 - 135 U/L Final     AST   Date Value Ref Range Status   03/17/2023 21 10 - 40 U/L Final     ALT   Date Value Ref Range Status   03/17/2023 17 10 - 44 U/L Final     Anion Gap   Date Value Ref Range Status   01/01/2024 13 8 - 16 mmol/L Final     eGFR if    Date Value Ref Range Status   11/23/2021 >60 >60 mL/min/1.73 m^2 Final     eGFR if non    Date Value Ref Range Status   11/23/2021 >60 >60 mL/min/1.73 m^2 Final     Comment:     Calculation used to obtain the estimated glomerular filtration  rate (eGFR) is the CKD-EPI equation.        Lab Results   Component Value Date    WBC 8.80 01/01/2024    HGB 16.3 01/01/2024    HCT 48.1 01/01/2024    MCV 89 01/01/2024     01/01/2024     Lab Results   Component Value Date    CHOL 152 08/05/2022    CHOL 209 (H) 11/23/2021    CHOL 203 (H) 01/23/2020     Lab Results   Component Value Date    HDL 52 08/05/2022    HDL 43 11/23/2021    HDL 36 (L) 01/23/2020     Lab Results   Component Value Date    LDLCALC 89.2 08/05/2022    LDLCALC 148.0 11/23/2021    LDLCALC 91.2 01/23/2020     Lab Results   Component Value Date    TRIG 54 08/05/2022    TRIG 90 11/23/2021    TRIG 379 (H) 01/23/2020     Lab Results   Component Value Date    CHOLHDL 34.2 08/05/2022    CHOLHDL 20.6 11/23/2021    CHOLHDL 17.7 (L) 01/23/2020     Lab Results   Component Value Date    TSH 0.595 03/17/2023    T3MODSS 131 08/05/2022       ASSESSMENT/PLAN   1. Gastroesophageal reflux disease,  unspecified whether esophagitis present  Comments:  Will trial holding suspected meds to see if improvement in symptoms.    2. Contact dermatitis, unspecified contact dermatitis type, unspecified trigger  -     triamcinolone acetonide 0.1% (KENALOG) 0.1 % ointment; Apply topically 2 (two) times daily.  Dispense: 80 g; Refill: 0    3. Lumbar back pain with radiculopathy affecting bilateral lower extremity  Comments:  pt to restart NSAIDs when done with clinda. he is following with specialist for back pain.    4. Benign essential hypertension    5. Moderate to severe YORDAN  Comments:  f/u with sleep med.             Cuauhtemoc Shay NP   Department of Internal Medicine - [facility]  1:32 PM

## 2024-05-30 ENCOUNTER — PATIENT MESSAGE (OUTPATIENT)
Dept: ADMINISTRATIVE | Facility: HOSPITAL | Age: 59
End: 2024-05-30
Payer: MEDICARE

## 2024-08-10 NOTE — TELEPHONE ENCOUNTER
----- Message from Elvis Betancourt sent at 8/11/2022 10:28 AM CDT -----  Name of Who is Calling: BRANDEE MAR JR.         What is the request in detail:The patient is confirming his appointment on tomorrow. Please advise          Can the clinic reply by [facility]: No         What Number to Call Back if not in [facility]: [phone]    
Added note that pt called to confirm appt.  
Walking

## 2024-09-11 ENCOUNTER — TELEPHONE (OUTPATIENT)
Dept: INTERNAL MEDICINE | Facility: CLINIC | Age: 59
End: 2024-09-11
Payer: MEDICARE

## 2024-09-16 ENCOUNTER — OFFICE VISIT (OUTPATIENT)
Dept: INTERNAL MEDICINE | Facility: CLINIC | Age: 59
End: 2024-09-16
Attending: INTERNAL MEDICINE
Payer: MEDICARE

## 2024-09-16 ENCOUNTER — LAB VISIT (OUTPATIENT)
Dept: LAB | Facility: OTHER | Age: 59
End: 2024-09-16
Attending: INTERNAL MEDICINE
Payer: MEDICARE

## 2024-09-16 VITALS
HEIGHT: 69 IN | BODY MASS INDEX: 37.45 KG/M2 | WEIGHT: 252.88 LBS | SYSTOLIC BLOOD PRESSURE: 126 MMHG | OXYGEN SATURATION: 95 % | DIASTOLIC BLOOD PRESSURE: 78 MMHG | HEART RATE: 83 BPM

## 2024-09-16 DIAGNOSIS — K21.9 GASTROESOPHAGEAL REFLUX DISEASE, UNSPECIFIED WHETHER ESOPHAGITIS PRESENT: ICD-10-CM

## 2024-09-16 DIAGNOSIS — R23.4 CHANGES IN SKIN TEXTURE: ICD-10-CM

## 2024-09-16 DIAGNOSIS — I70.0 AORTIC ATHEROSCLEROSIS: ICD-10-CM

## 2024-09-16 DIAGNOSIS — I50.22 HEART FAILURE WITH MILDLY REDUCED EJECTION FRACTION: ICD-10-CM

## 2024-09-16 DIAGNOSIS — Z00.00 ANNUAL PHYSICAL EXAM: ICD-10-CM

## 2024-09-16 DIAGNOSIS — L25.9 CONTACT DERMATITIS, UNSPECIFIED CONTACT DERMATITIS TYPE, UNSPECIFIED TRIGGER: ICD-10-CM

## 2024-09-16 DIAGNOSIS — J20.9 SUBACUTE BRONCHITIS: ICD-10-CM

## 2024-09-16 DIAGNOSIS — E66.01 SEVERE OBESITY (BMI 35.0-39.9) WITH COMORBIDITY: ICD-10-CM

## 2024-09-16 DIAGNOSIS — Z12.5 ENCOUNTER FOR SCREENING FOR MALIGNANT NEOPLASM OF PROSTATE: ICD-10-CM

## 2024-09-16 DIAGNOSIS — E78.2 MIXED HYPERLIPIDEMIA: ICD-10-CM

## 2024-09-16 DIAGNOSIS — R79.9 ABNORMAL FINDING OF BLOOD CHEMISTRY, UNSPECIFIED: ICD-10-CM

## 2024-09-16 DIAGNOSIS — Z00.00 ANNUAL PHYSICAL EXAM: Primary | ICD-10-CM

## 2024-09-16 DIAGNOSIS — I10 BENIGN ESSENTIAL HYPERTENSION: ICD-10-CM

## 2024-09-16 DIAGNOSIS — Z12.11 COLON CANCER SCREENING: ICD-10-CM

## 2024-09-16 DIAGNOSIS — J45.30 MILD PERSISTENT ASTHMA WITHOUT COMPLICATION: ICD-10-CM

## 2024-09-16 DIAGNOSIS — G47.33 OSA (OBSTRUCTIVE SLEEP APNEA): ICD-10-CM

## 2024-09-16 LAB
ALBUMIN SERPL BCP-MCNC: 4 G/DL (ref 3.5–5.2)
ALP SERPL-CCNC: 85 U/L (ref 55–135)
ALT SERPL W/O P-5'-P-CCNC: 22 U/L (ref 10–44)
ANION GAP SERPL CALC-SCNC: 8 MMOL/L (ref 8–16)
AST SERPL-CCNC: 17 U/L (ref 10–40)
BASOPHILS # BLD AUTO: 0.03 K/UL (ref 0–0.2)
BASOPHILS NFR BLD: 0.3 % (ref 0–1.9)
BILIRUB SERPL-MCNC: 0.5 MG/DL (ref 0.1–1)
BUN SERPL-MCNC: 14 MG/DL (ref 6–20)
CALCIUM SERPL-MCNC: 9.9 MG/DL (ref 8.7–10.5)
CHLORIDE SERPL-SCNC: 103 MMOL/L (ref 95–110)
CHOLEST SERPL-MCNC: 118 MG/DL (ref 120–199)
CHOLEST/HDLC SERPL: 2.8 {RATIO} (ref 2–5)
CO2 SERPL-SCNC: 29 MMOL/L (ref 23–29)
COMPLEXED PSA SERPL-MCNC: 0.25 NG/ML (ref 0–4)
CREAT SERPL-MCNC: 1 MG/DL (ref 0.5–1.4)
DIFFERENTIAL METHOD BLD: NORMAL
EOSINOPHIL # BLD AUTO: 0.1 K/UL (ref 0–0.5)
EOSINOPHIL NFR BLD: 1.4 % (ref 0–8)
ERYTHROCYTE [DISTWIDTH] IN BLOOD BY AUTOMATED COUNT: 12.3 % (ref 11.5–14.5)
EST. GFR  (NO RACE VARIABLE): >60 ML/MIN/1.73 M^2
ESTIMATED AVG GLUCOSE: 117 MG/DL (ref 68–131)
GLUCOSE SERPL-MCNC: 109 MG/DL (ref 70–110)
HBA1C MFR BLD: 5.7 % (ref 4–5.6)
HCT VFR BLD AUTO: 47.6 % (ref 40–54)
HDLC SERPL-MCNC: 42 MG/DL (ref 40–75)
HDLC SERPL: 35.6 % (ref 20–50)
HGB BLD-MCNC: 16.1 G/DL (ref 14–18)
IMM GRANULOCYTES # BLD AUTO: 0.02 K/UL (ref 0–0.04)
IMM GRANULOCYTES NFR BLD AUTO: 0.2 % (ref 0–0.5)
LDLC SERPL CALC-MCNC: 59.8 MG/DL (ref 63–159)
LYMPHOCYTES # BLD AUTO: 1.7 K/UL (ref 1–4.8)
LYMPHOCYTES NFR BLD: 18.8 % (ref 18–48)
MCH RBC QN AUTO: 30.3 PG (ref 27–31)
MCHC RBC AUTO-ENTMCNC: 33.8 G/DL (ref 32–36)
MCV RBC AUTO: 90 FL (ref 82–98)
MONOCYTES # BLD AUTO: 0.8 K/UL (ref 0.3–1)
MONOCYTES NFR BLD: 9.1 % (ref 4–15)
NEUTROPHILS # BLD AUTO: 6.2 K/UL (ref 1.8–7.7)
NEUTROPHILS NFR BLD: 70.2 % (ref 38–73)
NONHDLC SERPL-MCNC: 76 MG/DL
NRBC BLD-RTO: 0 /100 WBC
PLATELET # BLD AUTO: 290 K/UL (ref 150–450)
PMV BLD AUTO: 9.8 FL (ref 9.2–12.9)
POTASSIUM SERPL-SCNC: 3.7 MMOL/L (ref 3.5–5.1)
PROT SERPL-MCNC: 7.4 G/DL (ref 6–8.4)
RBC # BLD AUTO: 5.32 M/UL (ref 4.6–6.2)
SODIUM SERPL-SCNC: 140 MMOL/L (ref 136–145)
T4 FREE SERPL-MCNC: 1 NG/DL (ref 0.71–1.51)
TRIGL SERPL-MCNC: 81 MG/DL (ref 30–150)
TSH SERPL DL<=0.005 MIU/L-ACNC: 0.38 UIU/ML (ref 0.4–4)
WBC # BLD AUTO: 8.87 K/UL (ref 3.9–12.7)

## 2024-09-16 PROCEDURE — 84153 ASSAY OF PSA TOTAL: CPT | Mod: HCNC | Performed by: INTERNAL MEDICINE

## 2024-09-16 PROCEDURE — 99214 OFFICE O/P EST MOD 30 MIN: CPT | Mod: HCNC,S$GLB,, | Performed by: INTERNAL MEDICINE

## 2024-09-16 PROCEDURE — 3074F SYST BP LT 130 MM HG: CPT | Mod: HCNC,CPTII,S$GLB, | Performed by: INTERNAL MEDICINE

## 2024-09-16 PROCEDURE — 1159F MED LIST DOCD IN RCRD: CPT | Mod: HCNC,CPTII,S$GLB, | Performed by: INTERNAL MEDICINE

## 2024-09-16 PROCEDURE — 36415 COLL VENOUS BLD VENIPUNCTURE: CPT | Mod: HCNC | Performed by: INTERNAL MEDICINE

## 2024-09-16 PROCEDURE — 83036 HEMOGLOBIN GLYCOSYLATED A1C: CPT | Mod: HCNC | Performed by: INTERNAL MEDICINE

## 2024-09-16 PROCEDURE — G2211 COMPLEX E/M VISIT ADD ON: HCPCS | Mod: HCNC,S$GLB,, | Performed by: INTERNAL MEDICINE

## 2024-09-16 PROCEDURE — 84439 ASSAY OF FREE THYROXINE: CPT | Mod: HCNC | Performed by: INTERNAL MEDICINE

## 2024-09-16 PROCEDURE — 99999 PR PBB SHADOW E&M-EST. PATIENT-LVL III: CPT | Mod: PBBFAC,HCNC,, | Performed by: INTERNAL MEDICINE

## 2024-09-16 PROCEDURE — 3008F BODY MASS INDEX DOCD: CPT | Mod: HCNC,CPTII,S$GLB, | Performed by: INTERNAL MEDICINE

## 2024-09-16 PROCEDURE — 3078F DIAST BP <80 MM HG: CPT | Mod: HCNC,CPTII,S$GLB, | Performed by: INTERNAL MEDICINE

## 2024-09-16 PROCEDURE — 1160F RVW MEDS BY RX/DR IN RCRD: CPT | Mod: HCNC,CPTII,S$GLB, | Performed by: INTERNAL MEDICINE

## 2024-09-16 PROCEDURE — 85025 COMPLETE CBC W/AUTO DIFF WBC: CPT | Mod: HCNC | Performed by: INTERNAL MEDICINE

## 2024-09-16 PROCEDURE — 80061 LIPID PANEL: CPT | Mod: HCNC | Performed by: INTERNAL MEDICINE

## 2024-09-16 PROCEDURE — 84443 ASSAY THYROID STIM HORMONE: CPT | Mod: HCNC | Performed by: INTERNAL MEDICINE

## 2024-09-16 PROCEDURE — 80053 COMPREHEN METABOLIC PANEL: CPT | Mod: HCNC | Performed by: INTERNAL MEDICINE

## 2024-09-16 RX ORDER — PANTOPRAZOLE SODIUM 40 MG/1
40 TABLET, DELAYED RELEASE ORAL
Qty: 90 TABLET | Refills: 2 | Status: SHIPPED | OUTPATIENT
Start: 2024-09-16

## 2024-09-16 RX ORDER — PANTOPRAZOLE SODIUM 40 MG/1
40 TABLET, DELAYED RELEASE ORAL
Qty: 90 TABLET | Refills: 2 | Status: SHIPPED | OUTPATIENT
Start: 2024-09-16 | End: 2024-09-16 | Stop reason: SDUPTHER

## 2024-09-16 RX ORDER — ALBUTEROL SULFATE 90 UG/1
1-2 INHALANT RESPIRATORY (INHALATION) EVERY 6 HOURS PRN
Qty: 54 G | Refills: 0 | Status: SHIPPED | OUTPATIENT
Start: 2024-09-16

## 2024-09-16 RX ORDER — TRIAMCINOLONE ACETONIDE 1 MG/G
OINTMENT TOPICAL 2 TIMES DAILY
Qty: 80 G | Refills: 0 | Status: SHIPPED | OUTPATIENT
Start: 2024-09-16

## 2024-09-16 RX ORDER — CETIRIZINE HYDROCHLORIDE 10 MG/1
10 TABLET ORAL DAILY
Qty: 90 TABLET | Refills: 0 | Status: SHIPPED | OUTPATIENT
Start: 2024-09-16 | End: 2025-09-16

## 2024-09-16 NOTE — PROGRESS NOTES
Subjective:       Patient ID: Stevenson Robison Jr. is a 59 y.o. male.    Chief Complaint: Annual Exam (Pt is getting over Covid from 2 weeks ago. Still experiencing Brown/yellow phlegm. Runny nose and sneezing. )    Here for annual exam    COVID 2 weeks prior. Still has a cough that is productive. Patient denies F/C, SOB, chest tightness, eye pain, severe headache, inability to maintain adequate PO intake, significant loss of taste or smell, abdominal pain, nausea/vomiting, or ongoing diarrhea. Denies CP at rest or with exertion, dizziness with exertion, shortness of breath out of proportion to level of activity, frequent or sustained palpitations, orthopnea, PND, or LE edema.       Checks his BP at home and reported average is 124-128/74-78.    ### HF R EF ###  ## Stress (09/06/2023) EF of 40% and no WMA. No known hx of afib. Dx with moderate to severe and not currently being tx.     ### YORDAN ###  -Martín Tomas MD at 8/30/2023  moderate to severe obstructive sleep apnea    Has not yet started Tx.     ### HTN ###  HCTZ 25  8/5/22 BP remains elevated.  ADD amLODIPine     ### family Hx CAD ###  Brother MI      ### former smoker ###  09/06/2023   Bilateral sub 5 mm noncalcified pulmonary nodules.  Per Fleischner criteria optional follow-up with CT chest can be considered in the presence of high risk factors.        Review of Systems   Constitutional:  Negative for appetite change, chills, fever and unexpected weight change.   HENT:  Negative for hearing loss, sore throat and trouble swallowing.    Eyes:  Negative for visual disturbance.   Respiratory:  Positive for cough. Negative for chest tightness and shortness of breath.    Cardiovascular:  Negative for chest pain and leg swelling.   Gastrointestinal:  Negative for abdominal pain, blood in stool, constipation, diarrhea, nausea and vomiting.   Endocrine: Negative for polydipsia and polyuria.   Genitourinary:  Negative for decreased urine volume, difficulty  "urinating, dysuria, frequency and urgency.   Musculoskeletal:  Negative for gait problem.   Skin:  Negative for rash.   Neurological:  Negative for dizziness and numbness.   Psychiatric/Behavioral:  The patient is not nervous/anxious.        Objective:      Vitals:    09/16/24 1336 09/16/24 1811   BP: (!) 150/90 126/78   BP Location: Left arm    Patient Position: Sitting    BP Method: Large (Manual)    Pulse: 83    SpO2: 95%    Weight: 114.7 kg (252 lb 13.9 oz)    Height: 5' 9" (1.753 m)       Physical Exam  Vitals and nursing note reviewed.   Constitutional:       General: He is not in acute distress.     Appearance: Normal appearance. He is well-developed.   HENT:      Head: Normocephalic and atraumatic.      Mouth/Throat:      Pharynx: No oropharyngeal exudate.   Eyes:      General: No scleral icterus.     Conjunctiva/sclera: Conjunctivae normal.      Pupils: Pupils are equal, round, and reactive to light.   Neck:      Thyroid: No thyromegaly.   Cardiovascular:      Rate and Rhythm: Normal rate and regular rhythm.      Heart sounds: Normal heart sounds. No murmur heard.  Pulmonary:      Effort: Pulmonary effort is normal.      Breath sounds: Normal breath sounds. No wheezing or rales.   Abdominal:      General: There is no distension.   Musculoskeletal:         General: No tenderness.   Lymphadenopathy:      Cervical: No cervical adenopathy.   Skin:     General: Skin is warm and dry.   Neurological:      Mental Status: He is alert and oriented to person, place, and time.   Psychiatric:         Behavior: Behavior normal.         Assessment:       1. Annual physical exam    2. Contact dermatitis, unspecified contact dermatitis type, unspecified trigger    3. Benign essential hypertension    4. YORDAN (obstructive sleep apnea)    5. Gastroesophageal reflux disease, unspecified whether esophagitis present    6. Mixed hyperlipidemia    7. Mild persistent asthma without complication    8. Severe obesity (BMI 35.0-39.9) " "with comorbidity    9. Aortic atherosclerosis    10. Abnormal finding of blood chemistry, unspecified    11. Changes in skin texture    12. Encounter for screening for malignant neoplasm of prostate    13. Subacute bronchitis    14. Heart failure with mildly reduced ejection fraction    15. Colon cancer screening        Plan:       Stevenson Roche" was seen today for annual exam.    Diagnoses and all orders for this visit:    Annual physical exam  -     Comprehensive Metabolic Panel; Future  -     Lipid Panel; Future  -     TSH; Future  -     CBC Auto Differential; Future  -     Hemoglobin A1C; Future  -     PSA, Screening; Future    Contact dermatitis, unspecified contact dermatitis type, unspecified trigger  -     triamcinolone acetonide 0.1% (KENALOG) 0.1 % ointment; Apply topically 2 (two) times daily.    Benign essential hypertension    YORDAN (obstructive sleep apnea)    Gastroesophageal reflux disease, unspecified whether esophagitis present    Mixed hyperlipidemia    Mild persistent asthma without complication    Severe obesity (BMI 35.0-39.9) with comorbidity    Aortic atherosclerosis    Abnormal finding of blood chemistry, unspecified  -     Lipid Panel; Future  -     TSH; Future  -     CBC Auto Differential; Future  -     Hemoglobin A1C; Future  -     PSA, Screening; Future    Changes in skin texture  -     TSH; Future    Encounter for screening for malignant neoplasm of prostate  -     PSA, Screening; Future    Subacute bronchitis  -     albuterol (PROVENTIL/VENTOLIN HFA) 90 mcg/actuation inhaler; Inhale 1-2 puffs into the lungs every 6 (six) hours as needed for Wheezing. Rescue    Heart failure with mildly reduced ejection fraction  -     Echo; Future    Colon cancer screening  -     Ambulatory referral/consult to Endo Procedure ; Future    Other orders  -     Discontinue: pantoprazole (PROTONIX) 40 MG tablet; Take 1 tablet (40 mg total) by mouth before breakfast.  -     pantoprazole (PROTONIX) 40 MG " tablet; Take 1 tablet (40 mg total) by mouth before breakfast.  -     cetirizine (ZYRTEC) 10 MG tablet; Take 1 tablet (10 mg total) by mouth once daily.       Visit today is associated with current or anticipated ongoing medical care related to this patient's single serious condition/complex condition of heart failure, HTN, YORDAN. The patient will return to see me as these issues will be followed longitudinally.      Ty Whatley MD  Internal Medicine-[facility]        Side effects of medication(s) were discussed in detail and patient voiced understanding.  Patient will call back for any issues or complications.

## 2024-09-19 ENCOUNTER — TELEPHONE (OUTPATIENT)
Dept: INTERNAL MEDICINE | Facility: CLINIC | Age: 59
End: 2024-09-19
Payer: MEDICARE

## 2024-09-19 NOTE — TELEPHONE ENCOUNTER
Pt phoned stated he missed a call from Dr. Rodríguez office stated the message was very muffled. Stated he just has a visit so wasn't sure if the message was concerning his lab work.  Please advise.

## 2024-09-19 NOTE — TELEPHONE ENCOUNTER
----- Message from Villalobos Araceli Pratima sent at 9/19/2024  3:56 PM CDT -----  Regarding: missed call  Type:  Patient Returning Call    Who Called: pt  Who Left Message for Patient: unknown   Does the patient know what this is regarding?:   Would the patient rather a call back or a response via [facility]? call  Best Call Back Number: [phone]   Additional Information:

## 2024-10-29 ENCOUNTER — TELEPHONE (OUTPATIENT)
Dept: INTERNAL MEDICINE | Facility: CLINIC | Age: 59
End: 2024-10-29
Payer: MEDICARE

## 2024-11-04 ENCOUNTER — PATIENT OUTREACH (OUTPATIENT)
Dept: ADMINISTRATIVE | Facility: HOSPITAL | Age: 59
End: 2024-11-04
Payer: MEDICARE

## 2024-11-05 ENCOUNTER — TELEPHONE (OUTPATIENT)
Dept: ADMINISTRATIVE | Facility: CLINIC | Age: 59
End: 2024-11-05
Payer: MEDICARE

## 2024-11-05 NOTE — TELEPHONE ENCOUNTER
Called pt; no answer; could not confirm appt or leave message due to line kept ringing; I was calling to confirm pt's in office [facility] appt on 11/6/24.

## 2024-11-06 ENCOUNTER — PATIENT MESSAGE (OUTPATIENT)
Dept: INTERNAL MEDICINE | Facility: CLINIC | Age: 59
End: 2024-11-06

## 2024-11-06 ENCOUNTER — OFFICE VISIT (OUTPATIENT)
Dept: INTERNAL MEDICINE | Facility: CLINIC | Age: 59
End: 2024-11-06
Payer: MEDICARE

## 2024-11-06 ENCOUNTER — HOSPITAL ENCOUNTER (OUTPATIENT)
Dept: RADIOLOGY | Facility: OTHER | Age: 59
Discharge: HOME | End: 2024-11-06
Payer: MEDICARE

## 2024-11-06 VITALS
HEART RATE: 74 BPM | BODY MASS INDEX: 36.6 KG/M2 | HEIGHT: 69 IN | WEIGHT: 247.13 LBS | DIASTOLIC BLOOD PRESSURE: 84 MMHG | SYSTOLIC BLOOD PRESSURE: 140 MMHG | OXYGEN SATURATION: 96 %

## 2024-11-06 DIAGNOSIS — R10.9 FLANK PAIN: Primary | ICD-10-CM

## 2024-11-06 DIAGNOSIS — N30.01 ACUTE CYSTITIS WITH HEMATURIA: ICD-10-CM

## 2024-11-06 DIAGNOSIS — R10.9 FLANK PAIN: ICD-10-CM

## 2024-11-06 DIAGNOSIS — M25.562 ACUTE PAIN OF LEFT KNEE: ICD-10-CM

## 2024-11-06 LAB
BILIRUB SERPL-MCNC: NEGATIVE MG/DL
BILIRUB UR QL STRIP: NEGATIVE
BLOOD URINE, POC: NORMAL
CLARITY UR REFRACT.AUTO: CLEAR
CLARITY, POC UA: CLEAR
COLOR UR AUTO: COLORLESS
COLOR, POC UA: YELLOW
GLUCOSE UR QL STRIP: NEGATIVE
GLUCOSE UR QL STRIP: NEGATIVE
HGB UR QL STRIP: NEGATIVE
KETONES UR QL STRIP: NEGATIVE
KETONES UR QL STRIP: NEGATIVE
LEUKOCYTE ESTERASE UR QL STRIP: NEGATIVE
LEUKOCYTE ESTERASE URINE, POC: NEGATIVE
NITRITE UR QL STRIP: NEGATIVE
NITRITE, POC UA: NEGATIVE
PH UR STRIP: 5 [PH] (ref 5–8)
PH, POC UA: 5.5
PROT UR QL STRIP: NEGATIVE
PROTEIN, POC: NEGATIVE
SP GR UR STRIP: 1.01 (ref 1–1.03)
SPECIFIC GRAVITY, POC UA: 1.01
URN SPEC COLLECT METH UR: ABNORMAL
UROBILINOGEN, POC UA: NORMAL

## 2024-11-06 PROCEDURE — 81002 URINALYSIS NONAUTO W/O SCOPE: CPT | Mod: HCNC,S$GLB,,

## 2024-11-06 PROCEDURE — 81003 URINALYSIS AUTO W/O SCOPE: CPT | Mod: HCNC

## 2024-11-06 PROCEDURE — 99215 OFFICE O/P EST HI 40 MIN: CPT | Mod: HCNC,S$GLB,,

## 2024-11-06 PROCEDURE — 1159F MED LIST DOCD IN RCRD: CPT | Mod: HCNC,CPTII,S$GLB,

## 2024-11-06 PROCEDURE — 3044F HG A1C LEVEL LT 7.0%: CPT | Mod: HCNC,CPTII,S$GLB,

## 2024-11-06 PROCEDURE — 3077F SYST BP >= 140 MM HG: CPT | Mod: HCNC,CPTII,S$GLB,

## 2024-11-06 PROCEDURE — 3008F BODY MASS INDEX DOCD: CPT | Mod: HCNC,CPTII,S$GLB,

## 2024-11-06 PROCEDURE — 74018 RADEX ABDOMEN 1 VIEW: CPT | Mod: TC,HCNC,FY

## 2024-11-06 PROCEDURE — 3079F DIAST BP 80-89 MM HG: CPT | Mod: HCNC,CPTII,S$GLB,

## 2024-11-06 PROCEDURE — 99999 PR PBB SHADOW E&M-EST. PATIENT-LVL III: CPT | Mod: PBBFAC,HCNC,,

## 2024-11-06 PROCEDURE — 74018 RADEX ABDOMEN 1 VIEW: CPT | Mod: 26,HCNC,, | Performed by: RADIOLOGY

## 2024-11-06 RX ORDER — NITROFURANTOIN 25; 75 MG/1; MG/1
100 CAPSULE ORAL 2 TIMES DAILY
Qty: 14 CAPSULE | Refills: 0 | Status: SHIPPED | OUTPATIENT
Start: 2024-11-06

## 2024-11-06 RX ORDER — DICLOFENAC SODIUM 10 MG/G
2 GEL TOPICAL 4 TIMES DAILY
Qty: 20 G | Refills: 0 | Status: SHIPPED | OUTPATIENT
Start: 2024-11-06

## 2024-11-06 NOTE — PROGRESS NOTES
CHIEF COMPLAINT     Chief Complaint   Patient presents with    Back Pain    Leg Pain     Mid to lower back, and stabbing pain in left leg. Going on for a week.        HPI     Stevenson Robison Jr. is a 59 y.o. male who presents for xxx today.    PCP is Ty Rodríguez MD, patient is known to me. Pt consents to AI recording of visit for documentation purposes.     History of Present Illness    CHIEF COMPLAINT:  Patient presents with back pain and concerns about a possible kidney or bladder infection, as well as a new sharp pain in the back of the leg.    HPI:  Patient reports back pain for about 2 weeks, describing it as different from his usual back pain. He suspects a kidney or bladder infection, noting dark urine and dysuria. He denies fever or chills. He has been applying heat to the painful area of his back.    In the last 6-7 days, the patient has had sharp, burning pains in the back of his leg, behind the knee. These pains occur suddenly, particularly when lying down and relaxing. The pain is specifically noted when lying on his left side and resolves when supine.    Patient mentions a severe cough with significant sputum production. He reports that doctors have been auscultating his heart.    Patient reports increased water intake, consuming 5 bottles yesterday. He also mentions recent weight loss of 9-10 lbs, from 256 lbs at his last visit to 247 lbs currently.    Patient's wife reports that about 2 weeks ago, his blood pressure was elevated at approximately 157-158/100. More recently, it has been in the 130s and 140s.    Patient denies fever, chills, swelling in the lower leg, and constant pain with ambulation. He also denies having a cold, rhinorrhea, or pharyngitis.    MEDICAL HISTORY:  Patient has a history of back pain.    TEST RESULTS:  Patient recently underwent a urinalysis, which revealed microscopic hematuria. The sample was noted to be very clear and light in color. A TSH test was conducted  a few months ago, showing low (borderline) results, while the free T4 was normal.    IMAGING:  Patient had a chest XR on January 1st, approximately 10 years ago.    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      ROS:  General: -fever, -chills, -fatigue, -weight gain, +weight loss  Eyes: -vision changes, -redness, -discharge  ENT: -ear pain, -nasal congestion, -sore throat  Cardiovascular: -chest pain, -palpitations, -lower extremity edema  Respiratory: +cough, -shortness of breath  Gastrointestinal: -abdominal pain, -nausea, -vomiting, -diarrhea, -constipation, -blood in stool  Genitourinary: +dysuria, -hematuria, -frequency  Musculoskeletal: -joint pain, -muscle pain, +back pain  Skin: -rash, -lesion  Neurological: -headache, -dizziness, -numbness, -tingling  Psychiatric: -anxiety, -depression, -sleep difficulty          Past Medical History:  Past Medical History:   Diagnosis Date    Asthma     Hypertension        Home Medications:  Prior to Admission medications    Medication Sig Start Date End Date Taking? Authorizing Provider   albuterol (PROVENTIL/VENTOLIN HFA) 90 mcg/actuation inhaler Inhale 1-2 puffs into the lungs every 6 (six) hours as needed for Wheezing. Rescue 9/16/24  Yes Ty Rodríguez MD   amLODIPine (NORVASC) 5 MG tablet Take 1 tablet (5 mg total) by mouth once daily. 8/17/23  Yes Ty Rodríguez MD   aspirin 81 MG Chew Take 81 mg by mouth once daily.   Yes Provider, Historical   atorvastatin (LIPITOR) 40 MG tablet TAKE 1 TABLET BY MOUTH EVERY DAY 11/17/23  Yes Ty Rodríguez MD   cetirizine (ZYRTEC) 10 MG tablet Take 1 tablet (10 mg total) by mouth once daily. 9/16/24 9/16/25 Yes Ty Rodríguez MD   cyclobenzaprine (FLEXERIL) 10 MG tablet Take by mouth. 2/24/23  Yes Provider, Historical   hydroCHLOROthiazide (HYDRODIURIL) 25 MG tablet Take 1 tablet (25 mg total) by mouth once daily. 10/10/23  Yes Ty Rodríguez MD  "  hydrocodone-acetaminophen 10-325mg (NORCO)  mg Tab Take 1 tablet by mouth 3 (three) times daily as needed.   Yes Provider, Historical   pantoprazole (PROTONIX) 40 MG tablet Take 1 tablet (40 mg total) by mouth before breakfast. 9/16/24  Yes Ty Rodríguez MD   triamcinolone acetonide 0.1% (KENALOG) 0.1 % ointment Apply topically 2 (two) times daily. 9/16/24  Yes Ty Rodríguez MD   diclofenac sodium (VOLTAREN) 1 % Gel Apply 2 g topically 4 (four) times daily. 11/6/24   Cuauhtemoc Shay NP       Review of Systems:  Review of Systems   Constitutional: Negative.    Respiratory: Negative.     Cardiovascular: Negative.        Health Maintainence:   Immunizations:  Health Maintenance         Date Due Completion Date    Pneumococcal Vaccines (Age 0-64) (1 of 2 - PCV) Never done ---    Shingles Vaccine (1 of 2) Never done ---    Colorectal Cancer Screening 02/23/2022 2/23/2017    Influenza Vaccine (1) 09/01/2024 10/8/2023    COVID-19 Vaccine (3 - 2024-25 season) 09/01/2024 8/20/2021    PROSTATE-SPECIFIC ANTIGEN 09/16/2025 9/16/2024    High Dose Statin 09/16/2025 9/16/2024    Hemoglobin A1c (Diabetic Prevention Screening) 09/16/2027 9/16/2024    TETANUS VACCINE 10/18/2028 10/18/2018    Lipid Panel 09/16/2029 9/16/2024    RSV Vaccine (Age 60+ and Pregnant patients) (1 - 1-dose 75+ series) 02/11/2040 ---             PHYSICAL EXAM     BP (!) 140/84   Pulse 74   Ht 5' 9" (1.753 m)   Wt 112.1 kg (247 lb 2.2 oz)   SpO2 96%   BMI 36.50 kg/m²     Physical Exam  Vitals reviewed.   Constitutional:       Appearance: He is well-developed.   HENT:      Head: Normocephalic and atraumatic.   Eyes:      Conjunctiva/sclera: Conjunctivae normal.   Cardiovascular:      Rate and Rhythm: Normal rate.   Pulmonary:      Effort: Pulmonary effort is normal. No respiratory distress.   Musculoskeletal:      Thoracic back: Tenderness present.        Back:       Comments: Unable to reproduce tenderness with palpation in " "posterior L knee   Skin:     General: Skin is warm and dry.      Findings: No rash.   Neurological:      Mental Status: He is alert and oriented to person, place, and time.      Coordination: Coordination normal.   Psychiatric:         Behavior: Behavior normal.           ASSESSMENT/PLAN   Assessment & Plan    Considering kidney stone vs urinary tract infection as cause of back pain and urinary symptoms  Ordered x-ray to check for kidney stone before pursuing CT due to lower radiation exposure and cost  Low suspicion for blood clot in leg due to positional nature of pain and lack of swelling/redness  Considering bursitis as possible cause of leg pain  Monitoring borderline low TSH level with repeat testing, as initial result likely represents transient fluctuation    HYPERTHYROIDISM:  - Explained potential causes and symptoms of hyperthyroidism.  - Discussed role of TSH in thyroid function and negative feedback loop with T3/T4.  - TSH level ordered.    LEG PAIN:  - Apply Voltaren gel to affected area of leg 2-3 times daily.  - Started Voltaren gel, apply to affected area of leg 2-3 times daily.  - Contact the office immediately if leg pain worsens, moves, becomes constant, or if redness/swelling/lump develops.    KIDNEY STONE:  - X-ray ordered to check for kidney stone.  - Patient to increase water intake.    FOLLOW UP:  - Follow up daily via patient portal for next few days to assess improvement.  - Reply to message sent tomorrow to report on status.  - Follow up on Friday to ensure no worsening before weekend.          Stevenson Roche" was seen today for back pain and leg pain.    Diagnoses and all orders for this visit:    Flank pain  -     X-Ray Abdomen AP 1 View; Future  -     POCT urine dipstick without microscope  -     Urinalysis, Reflex to Urine Culture Urine, Clean Catch    Acute pain of left knee  -     diclofenac sodium (VOLTAREN) 1 % Gel; Apply 2 g topically 4 (four) times daily.          Cuauhtemoc Shay, " NP   Department of Internal Medicine - [facility]  1:56 PM

## 2024-11-13 ENCOUNTER — PATIENT MESSAGE (OUTPATIENT)
Dept: ADMINISTRATIVE | Facility: HOSPITAL | Age: 59
End: 2024-11-13
Payer: MEDICARE

## 2024-11-18 DIAGNOSIS — E78.2 MIXED HYPERLIPIDEMIA: ICD-10-CM

## 2024-11-18 RX ORDER — ATORVASTATIN CALCIUM 40 MG/1
40 TABLET, FILM COATED ORAL DAILY
Qty: 90 TABLET | Refills: 3 | Status: SHIPPED | OUTPATIENT
Start: 2024-11-18

## 2024-11-18 NOTE — TELEPHONE ENCOUNTER
No care due was identified.  [facility] Embedded Care Due Messages. Reference number: [id number].   11/18/2024 4:13:38 PM CST

## 2024-11-18 NOTE — TELEPHONE ENCOUNTER
No care due was identified.  [facility] Embedded Care Due Messages. Reference number: [id number].   11/18/2024 4:35:15 PM CST

## 2024-11-19 RX ORDER — HYDROCHLOROTHIAZIDE 25 MG/1
25 TABLET ORAL DAILY
Qty: 90 TABLET | Refills: 3 | Status: SHIPPED | OUTPATIENT
Start: 2024-11-19

## 2024-11-19 NOTE — TELEPHONE ENCOUNTER
Refill Decision Note   Stevenson Robison  is requesting a refill authorization.  Brief Assessment and Rationale for Refill:  Approve     Medication Therapy Plan:        Comments:     Note composed:10:26 PM 11/18/2024

## 2024-11-19 NOTE — TELEPHONE ENCOUNTER
called pt and he claim he could not hear. I called again, same thing. Pt said he will call from a different number.

## 2024-11-19 NOTE — TELEPHONE ENCOUNTER
Requested refill has been sent to pharmacy. If patient is due for visit then please schedule appointment. If not then please contact patient and see if they have home blood pressure readings or the ability to check their blood pressure at home. Please obtain their most recent blood pressure along with an average. If they do not have home BP then please schedule follow up within 7-10 days for nurse visit for BP check. Please make sure patient has all meds prescribed and is taking all medications prior to their nurse visit.     Respectfully,  Ty Whatley

## 2024-11-19 NOTE — TELEPHONE ENCOUNTER
Refill Routing Note   Medication(s) are not appropriate for processing by [facility] for the following reason(s):        Required vitals abnormal    [facility] action(s):  Defer             Appointments  past 12m or future 3m with PCP    Date Provider   Last Visit   9/16/2024 Ty Rodríguez MD   Next Visit   Visit date not found Ty Rodríguez MD   ED visits in past 90 days: 0        Note composed:10:24 PM 11/18/2024

## 2024-11-20 ENCOUNTER — TELEPHONE (OUTPATIENT)
Dept: INTERNAL MEDICINE | Facility: CLINIC | Age: 59
End: 2024-11-20
Payer: MEDICARE

## 2024-11-20 VITALS — DIASTOLIC BLOOD PRESSURE: 97 MMHG | SYSTOLIC BLOOD PRESSURE: 143 MMHG

## 2024-11-20 NOTE — TELEPHONE ENCOUNTER
----- Message from Leana sent at 11/19/2024  4:47 PM CST -----  Type:  Patient Returning Call    Who Called:     Who Left Message for Patient:     Does the patient know what this is regarding?: missed call     Best Call Back Number: [phone]    Additional Information:

## 2024-11-20 NOTE — TELEPHONE ENCOUNTER
Spoke to patient, f/u appt made and recent b/p reading recorded in patients chart  143/97 taken this morning before meds

## 2024-11-21 NOTE — TELEPHONE ENCOUNTER
Spoke with patient stating message below:      Ty Rodríguez MD Physician SignedYesterday       Rec pt increase amlodipine to 7.5mg (1.5tablets)        Patient verbalized understanding.

## 2024-12-04 ENCOUNTER — TELEPHONE (OUTPATIENT)
Dept: INTERNAL MEDICINE | Facility: CLINIC | Age: 59
End: 2024-12-04
Payer: MEDICARE

## 2024-12-04 ENCOUNTER — CLINICAL SUPPORT (OUTPATIENT)
Dept: ENDOSCOPY | Facility: HOSPITAL | Age: 59
End: 2024-12-04
Attending: INTERNAL MEDICINE
Payer: MEDICARE

## 2024-12-04 VITALS — BODY MASS INDEX: 37.03 KG/M2 | WEIGHT: 250 LBS | HEIGHT: 69 IN

## 2024-12-04 DIAGNOSIS — Z12.11 COLON CANCER SCREENING: ICD-10-CM

## 2024-12-04 RX ORDER — SODIUM, POTASSIUM,MAG SULFATES 17.5-3.13G
1 SOLUTION, RECONSTITUTED, ORAL ORAL DAILY
Qty: 1 KIT | Refills: 0 | Status: SHIPPED | OUTPATIENT
Start: 2024-12-04 | End: 2024-12-06

## 2024-12-04 NOTE — TELEPHONE ENCOUNTER
Spoke with Shellie Jackson. She said that the colonoscopy nurse would like the pt to to get the echo done prior to his colonoscopy. Schedule pt for echo on 12/6/24.

## 2024-12-04 NOTE — TELEPHONE ENCOUNTER
----- Message from Naveen sent at 12/4/2024  2:46 PM CST -----  Regarding: Telephone Call  Name of Who is Calling:  Patient Karen          What is the request in detail:  Please call patient wife about the patient ECHO            Can the clinic reply by [facility]: No            What [facility]: [phone]

## 2024-12-05 ENCOUNTER — TELEPHONE (OUTPATIENT)
Dept: ENDOSCOPY | Facility: HOSPITAL | Age: 59
End: 2024-12-05
Payer: MEDICARE

## 2024-12-05 NOTE — TELEPHONE ENCOUNTER
Referral for procedure from PAT appointment      Spoke to pt to schedule procedure(s) Colonoscopy       Physician to perform procedure(s) Dr. HENDERSON  Date of Procedure (s) 1/28/25  Arrival Time 8:00 AM  Time of Procedure(s) 9:00 AM   Location of Procedure(s) [place] [address]  Type of Rx Prep sent to patient: Suprep  Instructions provided to patient via [facility]    Patient was informed on the following information and verbalized understanding. Screening questionnaire reviewed with patient and complete. If procedure requires anesthesia, a responsible adult needs to be present to accompany the patient home, patient cannot drive after receiving anesthesia. Appointment details are tentative, especially check-in time. Patient will receive a prep-op call 7 days prior to confirm check-in time for procedure. If applicable the patient should contact their pharmacy to verify Rx for procedure prep is ready for pick-up. Patient was advised to call the scheduling department at [phone] if pharmacy states no Rx is available. Patient was advised to call the endoscopy scheduling department if any questions or concerns arise.      [facility]oscopy Scheduling Department

## 2024-12-06 ENCOUNTER — HOSPITAL ENCOUNTER (OUTPATIENT)
Dept: CARDIOLOGY | Facility: HOSPITAL | Age: 59
Discharge: HOME | End: 2024-12-06
Attending: INTERNAL MEDICINE
Payer: MEDICARE

## 2024-12-06 VITALS
DIASTOLIC BLOOD PRESSURE: 82 MMHG | SYSTOLIC BLOOD PRESSURE: 142 MMHG | HEIGHT: 69 IN | HEART RATE: 74 BPM | WEIGHT: 250 LBS | BODY MASS INDEX: 37.03 KG/M2

## 2024-12-06 DIAGNOSIS — I50.22 HEART FAILURE WITH MILDLY REDUCED EJECTION FRACTION: ICD-10-CM

## 2024-12-06 LAB
ASCENDING AORTA: 3.08 CM
AV AREA BY CONTINUOUS VTI: 2.5 CM2
AV INDEX (PROSTH): 0.55
AV LVOT MEAN GRADIENT: 2 MMHG
AV LVOT PEAK GRADIENT: 3 MMHG
AV MEAN GRADIENT: 6.1 MMHG
AV PEAK GRADIENT: 11.6 MMHG
AV VALVE AREA BY VELOCITY RATIO: 2.4 CM²
AV VALVE AREA: 2.5 CM2
AV VELOCITY RATIO: 0.53
BSA FOR ECHO PROCEDURE: 2.35 M2
CV ECHO LV RWT: 0.29 CM
DOP CALC AO PEAK VEL: 1.7 M/S
DOP CALC AO VTI: 33.9 CM
DOP CALC LVOT AREA: 4.5 CM2
DOP CALC LVOT DIAMETER: 2.4 CM
DOP CALC LVOT PEAK VEL: 0.9 M/S
DOP CALC LVOT STROKE VOLUME: 83.6 CM3
DOP CALCLVOT PEAK VEL VTI: 18.5 CM
E WAVE DECELERATION TIME: 147.58 MS
E/A RATIO: 0.65
E/E' RATIO: 7.33 M/S
ECHO EF ESTIMATED: 54 %
ECHO LV POSTERIOR WALL: 0.9 CM (ref 0.6–1.1)
FRACTIONAL SHORTENING: 28.6 % (ref 28–44)
GLOBAL LONGITUIDAL STRAIN: 13.7 %
INTERVENTRICULAR SEPTUM: 0.9 CM (ref 0.6–1.1)
IVRT: 111.32 MS
LA MAJOR: 6.41 CM
LA MINOR: 6.29 CM
LA WIDTH: 4.66 CM
LEFT ATRIUM SIZE: 5.03 CM
LEFT ATRIUM VOLUME INDEX MOD: 45.2 ML/M2
LEFT ATRIUM VOLUME INDEX: 55.7 ML/M2
LEFT ATRIUM VOLUME MOD: 102.58 ML
LEFT ATRIUM VOLUME: 126.51 CM3
LEFT INTERNAL DIMENSION IN SYSTOLE: 4.5 CM (ref 2.1–4)
LEFT VENTRICLE DIASTOLIC VOLUME INDEX: 87.38 ML/M2
LEFT VENTRICLE DIASTOLIC VOLUME: 198.36 ML
LEFT VENTRICLE MASS INDEX: 103.4 G/M2
LEFT VENTRICLE SYSTOLIC VOLUME INDEX: 40.3 ML/M2
LEFT VENTRICLE SYSTOLIC VOLUME: 91.53 ML
LEFT VENTRICULAR INTERNAL DIMENSION IN DIASTOLE: 6.3 CM (ref 3.5–6)
LEFT VENTRICULAR MASS: 234.7 G
LV LATERAL E/E' RATIO: 6.11
LV SEPTAL E/E' RATIO: 9.17
MV A" WAVE DURATION": 77.07 MS
MV PEAK A VEL: 0.84 M/S
MV PEAK E VEL: 0.55 M/S
OHS CV RV/LV RATIO: 0.76 CM
OHS LV EJECTION FRACTION SIMPSONS BIPLANE MOD: 40 %
PISA TR MAX VEL: 2.3 M/S
PULM VEIN A" WAVE DURATION": 77.07 MS
PULM VEIN S/D RATIO: 0.82
PULMONIC VEIN PEAK A VELOCITY: 0.3 M/S
PV PEAK D VEL: 0.44 M/S
PV PEAK S VEL: 0.36 M/S
RA MAJOR: 5.29 CM
RA PRESSURE ESTIMATED: 3 MMHG
RA WIDTH: 5.45 CM
RIGHT VENTRICLE DIASTOLIC BASEL DIMENSION: 4.8 CM
RV TB RVSP: 5 MMHG
RV TISSUE DOPPLER FREE WALL SYSTOLIC VELOCITY 1 (APICAL 4 CHAMBER VIEW): 8.14 CM/S
SINUS: 3.27 CM
STJ: 2.59 CM
TDI LATERAL: 0.09 M/S
TDI SEPTAL: 0.06 M/S
TDI: 0.08 M/S
TR MAX PG: 21 MMHG
TRICUSPID ANNULAR PLANE SYSTOLIC EXCURSION: 1.99 CM
TV PEAK GRADIENT: 21 MMHG
TV REST PULMONARY ARTERY PRESSURE: 24 MMHG
Z-SCORE OF LEFT VENTRICULAR DIMENSION IN END DIASTOLE: -2.92
Z-SCORE OF LEFT VENTRICULAR DIMENSION IN END SYSTOLE: -0.99

## 2024-12-06 PROCEDURE — 93306 TTE W/DOPPLER COMPLETE: CPT | Mod: 26,HCNC,, | Performed by: STUDENT IN AN ORGANIZED HEALTH CARE EDUCATION/TRAINING PROGRAM

## 2024-12-06 PROCEDURE — 93356 MYOCRD STRAIN IMG SPCKL TRCK: CPT | Mod: HCNC,,, | Performed by: STUDENT IN AN ORGANIZED HEALTH CARE EDUCATION/TRAINING PROGRAM

## 2024-12-06 PROCEDURE — 93306 TTE W/DOPPLER COMPLETE: CPT | Mod: HCNC

## 2024-12-11 ENCOUNTER — PATIENT MESSAGE (OUTPATIENT)
Dept: INTERNAL MEDICINE | Facility: CLINIC | Age: 59
End: 2024-12-11
Payer: MEDICARE

## 2024-12-11 DIAGNOSIS — I10 BENIGN ESSENTIAL HYPERTENSION: ICD-10-CM

## 2024-12-12 ENCOUNTER — TELEPHONE (OUTPATIENT)
Dept: ENDOSCOPY | Facility: HOSPITAL | Age: 59
End: 2024-12-12
Payer: MEDICARE

## 2024-12-12 RX ORDER — AMLODIPINE BESYLATE 5 MG/1
7.5 TABLET ORAL DAILY
Qty: 135 TABLET | Refills: 3 | Status: SHIPPED | OUTPATIENT
Start: 2024-12-12

## 2024-12-12 NOTE — TELEPHONE ENCOUNTER
No care due was identified.  [facility] Embedded Care Due Messages. Reference number: [id number].   12/12/2024 10:40:53 AM CST

## 2024-12-12 NOTE — TELEPHONE ENCOUNTER
----- Message from Mary sent at 2024  1:57 PM CST -----  Regardin/28 CL  The patient is currently under an internal PCP, pennie Lema. Is patient medically optimized by Other for their upcoming scheduled Colonoscopy on 25.       Notes: Pt has ECHO ordered by Anne on . Has appt with Anne on 1/15.

## 2025-01-13 ENCOUNTER — TELEPHONE (OUTPATIENT)
Dept: INTERNAL MEDICINE | Facility: CLINIC | Age: 60
End: 2025-01-13
Payer: MEDICARE

## 2025-01-13 NOTE — TELEPHONE ENCOUNTER
Patient was tested positive for covid on 1-10-25 have been coughing to now chest ache.     Please advise     LOV 9/16/24

## 2025-01-13 NOTE — TELEPHONE ENCOUNTER
----- Message from Kacy sent at 1/13/2025  9:28 AM CST -----  Patient was tested positive for covid on 1-10-25 have been coughing to now chest ache. Asking if doctor can send a prescription in for it. Please call the patient.        Thanks  THADDEUS

## 2025-01-14 DIAGNOSIS — Z00.00 ENCOUNTER FOR MEDICARE ANNUAL WELLNESS EXAM: ICD-10-CM

## 2025-01-24 ENCOUNTER — TELEPHONE (OUTPATIENT)
Dept: ENDOSCOPY | Facility: HOSPITAL | Age: 60
End: 2025-01-24
Payer: MEDICARE

## 2025-01-24 ENCOUNTER — TELEPHONE (OUTPATIENT)
Dept: INTERNAL MEDICINE | Facility: CLINIC | Age: 60
End: 2025-01-24

## 2025-01-24 ENCOUNTER — LAB VISIT (OUTPATIENT)
Dept: LAB | Facility: OTHER | Age: 60
End: 2025-01-24
Attending: INTERNAL MEDICINE
Payer: MEDICARE

## 2025-01-24 ENCOUNTER — OFFICE VISIT (OUTPATIENT)
Dept: INTERNAL MEDICINE | Facility: CLINIC | Age: 60
End: 2025-01-24
Attending: INTERNAL MEDICINE
Payer: MEDICARE

## 2025-01-24 VITALS
HEART RATE: 74 BPM | HEIGHT: 69 IN | OXYGEN SATURATION: 98 % | WEIGHT: 243.38 LBS | SYSTOLIC BLOOD PRESSURE: 132 MMHG | DIASTOLIC BLOOD PRESSURE: 77 MMHG | BODY MASS INDEX: 36.05 KG/M2

## 2025-01-24 DIAGNOSIS — I70.0 AORTIC ATHEROSCLEROSIS: ICD-10-CM

## 2025-01-24 DIAGNOSIS — Z01.818 PRE-OP EVALUATION: ICD-10-CM

## 2025-01-24 DIAGNOSIS — G47.33 OSA (OBSTRUCTIVE SLEEP APNEA): ICD-10-CM

## 2025-01-24 DIAGNOSIS — I10 BENIGN ESSENTIAL HYPERTENSION: ICD-10-CM

## 2025-01-24 DIAGNOSIS — I50.22 HEART FAILURE WITH MILDLY REDUCED EJECTION FRACTION: ICD-10-CM

## 2025-01-24 DIAGNOSIS — E66.01 SEVERE OBESITY (BMI 35.0-39.9) WITH COMORBIDITY: ICD-10-CM

## 2025-01-24 DIAGNOSIS — Z01.818 PRE-OP EVALUATION: Primary | ICD-10-CM

## 2025-01-24 PROCEDURE — 3008F BODY MASS INDEX DOCD: CPT | Mod: HCNC,CPTII,S$GLB, | Performed by: INTERNAL MEDICINE

## 2025-01-24 PROCEDURE — 99214 OFFICE O/P EST MOD 30 MIN: CPT | Mod: HCNC,S$GLB,, | Performed by: INTERNAL MEDICINE

## 2025-01-24 PROCEDURE — G2211 COMPLEX E/M VISIT ADD ON: HCPCS | Mod: HCNC,S$GLB,, | Performed by: INTERNAL MEDICINE

## 2025-01-24 PROCEDURE — 3075F SYST BP GE 130 - 139MM HG: CPT | Mod: HCNC,CPTII,S$GLB, | Performed by: INTERNAL MEDICINE

## 2025-01-24 PROCEDURE — 1160F RVW MEDS BY RX/DR IN RCRD: CPT | Mod: HCNC,CPTII,S$GLB, | Performed by: INTERNAL MEDICINE

## 2025-01-24 PROCEDURE — 36415 COLL VENOUS BLD VENIPUNCTURE: CPT | Mod: HCNC | Performed by: INTERNAL MEDICINE

## 2025-01-24 PROCEDURE — 99999 PR PBB SHADOW E&M-EST. PATIENT-LVL III: CPT | Mod: PBBFAC,HCNC,, | Performed by: INTERNAL MEDICINE

## 2025-01-24 PROCEDURE — 3078F DIAST BP <80 MM HG: CPT | Mod: HCNC,CPTII,S$GLB, | Performed by: INTERNAL MEDICINE

## 2025-01-24 PROCEDURE — 1159F MED LIST DOCD IN RCRD: CPT | Mod: HCNC,CPTII,S$GLB, | Performed by: INTERNAL MEDICINE

## 2025-01-24 PROCEDURE — 83880 ASSAY OF NATRIURETIC PEPTIDE: CPT | Mod: HCNC | Performed by: INTERNAL MEDICINE

## 2025-01-24 NOTE — TELEPHONE ENCOUNTER
----- Message from DerianCAISdavis sent at 1/24/2025 11:53 AM CST -----   Name of Who is Calling:     What is the request in detail:  patient request call back in reference to sooner clearance  appointment Please contact to further discuss and advise      Can the clinic reply by [facility]:     What Number to Call Back if not in [facility]:   [phone]

## 2025-01-24 NOTE — PROGRESS NOTES
Subjective:       Patient ID: Stevenson Robiosn Jr. is a 59 y.o. male.    Chief Complaint: Pre-op Exam and Follow-up (Tested positive for covid 2 weeks ago. F/u on being label high risk for colonoscopy surgery. )      2 months ago increased amlodipine to 7.5mg. Keeping track of BP ta home. Much improved since change. Some cough and SOB with exertion since having COVID < 2 weeks prior but prior to this was not having any difficulty with breathing. Denies CP at rest or with exertion, dizziness with exertion, shortness of breath out of proportion to level of activity, frequent or sustained palpitations, orthopnea, PND, or LE edema. Abnml ECHO below.            ### HF R EF ###  Stress (09/06/2023) EF of 40% and no WMA. No known hx of afib. Dx with moderate to severe and not currently being tx.    -ECHO (12/2024) vEF 40 - 45%. qEFis 40%. Global longitudinal strain is -13.7%. Grade I diastolic dysfunction. Moderate right ventricular enlargement. Wall thickness is normal. Right ventricle wall motion  is normal. Systolic function is normal.Left atrium is moderately dilated    ### YORDAN ###  -Martín Tomas MD at 8/30/2023  moderate to severe obstructive sleep apnea    Has not yet started Tx.   1/2025 has machine but not using    ### HTN ###  HCTZ 25  8/5/22 BP remains elevated.  ADD amLODIPine   12/2024 amlodipine increased to 7.5mg     ### family Hx CAD ###  Brother MI     ### former smoker ###  09/06/2023   Bilateral sub 5 mm noncalcified pulmonary nodules.  Per Fleischner criteria optional follow-up with CT chest can be considered in the presence of high risk factors.           History of Present Illness              Review of Systems   Constitutional:  Negative for appetite change, chills, fever and unexpected weight change.   HENT:  Negative for hearing loss, sore throat and trouble swallowing.    Eyes:  Negative for visual disturbance.   Respiratory:  Positive for shortness of breath. Negative for cough and chest  "tightness.    Cardiovascular:  Negative for chest pain and leg swelling.   Gastrointestinal:  Negative for abdominal pain, blood in stool, constipation, diarrhea, nausea and vomiting.   Endocrine: Negative for polydipsia and polyuria.   Genitourinary:  Negative for decreased urine volume, difficulty urinating, dysuria, frequency and urgency.   Musculoskeletal:  Negative for gait problem.   Skin:  Negative for rash.   Neurological:  Negative for dizziness and numbness.   Psychiatric/Behavioral:  The patient is not nervous/anxious.        Objective:      Vitals:    01/24/25 1557 01/24/25 1607   BP: (!) 150/90 132/77   BP Location: Right arm    Patient Position: Sitting    Pulse: 74    SpO2: 98%    Weight: 110.4 kg (243 lb 6.2 oz)    Height: 5' 9" (1.753 m)       Physical Exam  Vitals and nursing note reviewed.   Constitutional:       General: He is not in acute distress.     Appearance: Normal appearance. He is well-developed.   HENT:      Head: Normocephalic and atraumatic.      Mouth/Throat:      Pharynx: No oropharyngeal exudate.   Eyes:      General: No scleral icterus.     Conjunctiva/sclera: Conjunctivae normal.      Pupils: Pupils are equal, round, and reactive to light.   Neck:      Thyroid: No thyromegaly.   Cardiovascular:      Rate and Rhythm: Normal rate and regular rhythm.      Heart sounds: Normal heart sounds. No murmur heard.  Pulmonary:      Effort: Pulmonary effort is normal.      Breath sounds: Normal breath sounds. No wheezing or rales.   Abdominal:      General: There is no distension.   Musculoskeletal:         General: No tenderness.   Lymphadenopathy:      Cervical: No cervical adenopathy.   Skin:     General: Skin is warm and dry.   Neurological:      Mental Status: He is alert and oriented to person, place, and time.   Psychiatric:         Behavior: Behavior normal.         Assessment:       1. Pre-op evaluation    2. Benign essential hypertension    3. Aortic atherosclerosis    4. Moderate " "to severe YORDAN    5. Heart failure with mildly reduced ejection fraction    6. Severe obesity (BMI 35.0-39.9) with comorbidity        Plan:       Stevenson Roche" was seen today for pre-op exam and follow-up.    Diagnoses and all orders for this visit:    Pre-op evaluation   -No signs or symptoms of underlying or uncontrolled cardiac or pulmonary pathology  -Patient has an estimated 30 day risk of death, MI, or cardiac arrest of approx 3.9% using Revised Cardiac Risk Index for Pre-Operative Risk calculator. Patient is an acceptable risk candidate for low risk procedure.  -Patient capable of >4 METs.  -No additional testing or change in management is required prior to elective procedure.   -     NT-Pro Natriuretic Peptide; Future    Benign essential hypertension   Controlled and asymptomatic.  Continue current Rx regimen.    Aortic atherosclerosis   Controlled and asymptomatic.  Continue current Rx regimen.    Moderate to severe YORDAN   Stressed adherence of and complications related to untreated YORDAN.    Heart failure with mildly reduced ejection fraction   Controlled and asymptomatic.  Continue current Rx regimen.   F/u cardiology    Severe obesity (BMI 35.0-39.9) with comorbidity   Patient should eat food, not too much, and most should be vegetables and lean protein. Discussed goal of weight loss to be accomplished by calorie restriction to approx 0860-0689 calories/day. Cumulative psychical activity throughout your day does increase weight loss.  Vary sources in diet (ie different colored vegetables) along with adequate fiber discussed. Consistent and sustainable practices are key. Will review diet periodically to scan for potential for vitamin deficiencies.   Discussed a minimum exercise goal of moderate paced walking or similar level of activity for 30-45 consecutive minutes 4-5 times a week for cardiovascular benefit and overall reduction in morbidity and mortality.           Assessment & Plan            Visit today " is associated with current or anticipated ongoing medical care related to this patient's single serious condition/complex condition of heart failure reduced EF, HTN, YORDAN. The patient will return to see me as these issues will be followed longitudinally.    This note was generated with the assistance of ambient listening technology. Verbal consent was obtained by the patient and accompanying visitor(s) for the recording of patient appointment to facilitate this note. I attest to having reviewed and edited the generated note for accuracy, though some syntax or spelling errors may persist. Please contact the author of this note for any clarification.      Ty Whatley MD  Internal Medicine-[facility]        Side effects of medication(s) were discussed in detail and patient voiced understanding.  Patient will call back for any issues or complications.

## 2025-01-24 NOTE — TELEPHONE ENCOUNTER
Contacted patient regarding cardiology clearance for colonoscopy on 1/28/25.  He has missed some appointments for clearance.  He has an appointment for 1/31/25-but that is after his procedure.  He stated that he will call to try to be seen sooner.  Informed him that if he does not receive clearance the colonoscopy will be canceled.  Stated understanding.  Will follow up.

## 2025-01-27 ENCOUNTER — TELEPHONE (OUTPATIENT)
Dept: ENDOSCOPY | Facility: HOSPITAL | Age: 60
End: 2025-01-27
Payer: MEDICARE

## 2025-01-27 ENCOUNTER — PATIENT MESSAGE (OUTPATIENT)
Dept: INTERNAL MEDICINE | Facility: CLINIC | Age: 60
End: 2025-01-27
Payer: MEDICARE

## 2025-01-27 LAB — NT-PROBNP SERPL IA-MCNC: 64 PG/ML

## 2025-01-27 NOTE — TELEPHONE ENCOUNTER
Contacted patient regarding clearance for colonoscopy on 1/28/25.  Informed him that clearance was not received and the procedure will have to be canceled.  Stated understanding.  He said that he will call back as soon as he is cleared to reschedule.  He refused for me to schedule a follow up PAT appointment.  Main line phone number provided.

## 2025-01-27 NOTE — TELEPHONE ENCOUNTER
Dear Dr Rodríguez,    Patient has a scheduled procedure Colonoscopy 1/28/25 and in order to ensure patient safety, we would like to confirm if he/she is medically optimized for the procedure.      Thank you for your prompt reply.    [facility] Endoscopy Scheduling

## 2025-01-27 NOTE — TELEPHONE ENCOUNTER
"Ida Baptiste Rn msg,    "Dear Dr Rodríguez,     Patient has a scheduled procedure Colonoscopy 1/28/25 and in order to ensure patient safety, we would like to confirm if he/she is medically optimized for the procedure.      Thank you for your prompt reply.     [facility] Endoscopy Scheduling "    Please advise.   " exposure, bloodborne pathogen

## 2025-02-11 ENCOUNTER — OFFICE VISIT (OUTPATIENT)
Dept: CARDIOLOGY | Facility: CLINIC | Age: 60
End: 2025-02-11
Payer: MEDICARE

## 2025-02-11 VITALS
HEIGHT: 69 IN | SYSTOLIC BLOOD PRESSURE: 167 MMHG | OXYGEN SATURATION: 97 % | BODY MASS INDEX: 36.34 KG/M2 | DIASTOLIC BLOOD PRESSURE: 89 MMHG | HEART RATE: 66 BPM | WEIGHT: 245.38 LBS

## 2025-02-11 DIAGNOSIS — G47.33 OSA (OBSTRUCTIVE SLEEP APNEA): ICD-10-CM

## 2025-02-11 DIAGNOSIS — E78.2 MIXED HYPERLIPIDEMIA: ICD-10-CM

## 2025-02-11 DIAGNOSIS — I50.22 HEART FAILURE WITH MILDLY REDUCED EJECTION FRACTION: Primary | ICD-10-CM

## 2025-02-11 DIAGNOSIS — I10 BENIGN ESSENTIAL HYPERTENSION: ICD-10-CM

## 2025-02-11 DIAGNOSIS — I70.0 AORTIC ATHEROSCLEROSIS: ICD-10-CM

## 2025-02-11 DIAGNOSIS — E66.01 SEVERE OBESITY (BMI 35.0-39.9) WITH COMORBIDITY: ICD-10-CM

## 2025-02-11 PROCEDURE — 1159F MED LIST DOCD IN RCRD: CPT | Mod: HCNC,CPTII,S$GLB, | Performed by: INTERNAL MEDICINE

## 2025-02-11 PROCEDURE — 3008F BODY MASS INDEX DOCD: CPT | Mod: HCNC,CPTII,S$GLB, | Performed by: INTERNAL MEDICINE

## 2025-02-11 PROCEDURE — 3077F SYST BP >= 140 MM HG: CPT | Mod: HCNC,CPTII,S$GLB, | Performed by: INTERNAL MEDICINE

## 2025-02-11 PROCEDURE — 1160F RVW MEDS BY RX/DR IN RCRD: CPT | Mod: HCNC,CPTII,S$GLB, | Performed by: INTERNAL MEDICINE

## 2025-02-11 PROCEDURE — 99214 OFFICE O/P EST MOD 30 MIN: CPT | Mod: HCNC,S$GLB,, | Performed by: INTERNAL MEDICINE

## 2025-02-11 PROCEDURE — 3079F DIAST BP 80-89 MM HG: CPT | Mod: HCNC,CPTII,S$GLB, | Performed by: INTERNAL MEDICINE

## 2025-02-11 PROCEDURE — 99999 PR PBB SHADOW E&M-EST. PATIENT-LVL III: CPT | Mod: PBBFAC,HCNC,, | Performed by: INTERNAL MEDICINE

## 2025-02-11 RX ORDER — METOPROLOL SUCCINATE 25 MG/1
25 TABLET, EXTENDED RELEASE ORAL DAILY
Qty: 90 TABLET | Refills: 3 | Status: SHIPPED | OUTPATIENT
Start: 2025-02-11 | End: 2026-02-11

## 2025-02-11 RX ORDER — CEPHALEXIN 500 MG/1
500 CAPSULE ORAL 2 TIMES DAILY
COMMUNITY
Start: 2024-08-27 | End: 2025-02-11

## 2025-02-11 RX ORDER — AZITHROMYCIN 250 MG/1
TABLET, FILM COATED ORAL
COMMUNITY
Start: 2024-12-02 | End: 2025-02-11

## 2025-02-11 RX ORDER — VALSARTAN 160 MG/1
160 TABLET ORAL DAILY
Qty: 90 TABLET | Refills: 3 | Status: SHIPPED | OUTPATIENT
Start: 2025-02-11 | End: 2026-02-11

## 2025-02-11 NOTE — PROGRESS NOTES
Subjective:   Patient ID:  Stevenson Robison Jr. is a 60 y.o. male who presents for evaluation of No chief complaint on file.      HPI: Very pleasant man here for 1.5 year f/u.  Recent echo showed mild LVE and moderate RVE with an LVEF of 40-45%.  Meanwhile, he states that he starts to get winded after climbing the 5th flight of stairs.  No leg swelling.  He denies chest discomfort, ROBERTS, palpitations, PND/orthopnea, lightheadedness and syncope.        Dec 2024 Echocardiogram    Left Ventricle: The left ventricle is mildly dilated. Ventricular mass is normal. Normal wall thickness. There is mildly reduced systolic function with a visually estimated ejection fraction of 40 - 45%. Quantitated ejection fraction is 40%. Global longitudinal strain is -13.7%. Grade I diastolic dysfunction.    Right Ventricle: Moderate right ventricular enlargement. Wall thickness is normal. Systolic function is normal.    Left Atrium: Left atrium is moderately dilated.    Mitral Valve: There is mild regurgitation.    Pulmonary Artery: The estimated pulmonary artery systolic pressure is 24 mmHg.    IVC/SVC: Normal venous pressure at 3 mmHg.    Past Medical History:   Diagnosis Date    Asthma     Hypertension        Past Surgical History:   Procedure Laterality Date    BACK SURGERY      COLONOSCOPY N/A 02/23/2017    Procedure: COLONOSCOPY;  Surgeon: Wil Dimas MD;  Location: [facility] ([address] [place] [address]);  Service: Endoscopy;  Laterality: N/A;    decompression neck  2005    dental implant  06/2024    HERNIA REPAIR  2008    NASAL SEPTUM SURGERY  2005    SPINE SURGERY         Social History     Tobacco Use    Smoking status: Former    Smokeless tobacco: Former   Substance Use Topics    Alcohol use: No    Drug use: Yes     Types: Hydrocodone     Comment: prn prescription pain       Family History   Problem Relation Name Age of Onset    Lung disease Mother      Heart disease Father      Heart attack Father      Melanoma Father      No Known Problems  Sister Gissel     Hypertension Brother Jeff     Melanoma Paternal Grandfather         Current Outpatient Medications   Medication Sig    albuterol (PROVENTIL/VENTOLIN HFA) 90 mcg/actuation inhaler Inhale 1-2 puffs into the lungs every 6 (six) hours as needed for Wheezing. Rescue    amLODIPine (NORVASC) 5 MG tablet Take 1.5 tablets (7.5 mg total) by mouth once daily.    aspirin 81 MG Chew Take 81 mg by mouth once daily.    atorvastatin (LIPITOR) 40 MG tablet Take 1 tablet (40 mg total) by mouth once daily.    diclofenac sodium (VOLTAREN) 1 % Gel Apply 2 g topically 4 (four) times daily.    hydroCHLOROthiazide (HYDRODIURIL) 25 MG tablet Take 1 tablet (25 mg total) by mouth once daily.    hydrocodone-acetaminophen 10-325mg (NORCO)  mg Tab Take 1 tablet by mouth 3 (three) times daily as needed.    pantoprazole (PROTONIX) 40 MG tablet Take 1 tablet (40 mg total) by mouth before breakfast. (Patient taking differently: Take 40 mg by mouth as needed.)    triamcinolone acetonide 0.1% (KENALOG) 0.1 % ointment Apply topically 2 (two) times daily. (Patient taking differently: Apply topically 2 (two) times daily as needed.)     No current facility-administered medications for this visit.       Review of patient's allergies indicates:   Allergen Reactions    Advair diskus [fluticasone propion-salmeterol]      Hives     Other omega-3s Hives     Pt reports allergic to either an anti-inflammatory or antibiotic. PT unsure of name    Penicillins      Since childhood  Hives        ROS  The review of systems is negative except as above.    Objective:   Physical Exam  Vitals reviewed.   Constitutional:       Appearance: He is well-developed.   HENT:      Head: Normocephalic and atraumatic.   Eyes:      General: No scleral icterus.     Conjunctiva/sclera: Conjunctivae normal.   Neck:      Vascular: No JVD.   Cardiovascular:      Rate and Rhythm: Normal rate and regular rhythm.      Pulses: Intact distal pulses.      Heart sounds:  Normal heart sounds. No murmur heard.     No friction rub. No gallop.   Pulmonary:      Effort: Pulmonary effort is normal.      Breath sounds: Normal breath sounds. No wheezing or rales.   Abdominal:      General: Bowel sounds are normal. There is no distension.      Palpations: Abdomen is soft.      Tenderness: There is no abdominal tenderness.   Musculoskeletal:         General: Normal range of motion.      Cervical back: Normal range of motion and neck supple.   Skin:     General: Skin is warm and dry.      Findings: No erythema or rash.   Neurological:      Mental Status: He is alert and oriented to person, place, and time.   Psychiatric:         Behavior: Behavior normal.         Thought Content: Thought content normal.         Judgment: Judgment normal.         Lab Results   Component Value Date    WBC 8.87 09/16/2024    HGB 16.1 09/16/2024    HCT 47.6 09/16/2024    MCV 90 09/16/2024     09/16/2024         Chemistry        Component Value Date/Time     09/16/2024 1424    K 3.7 09/16/2024 1424     09/16/2024 1424    CO2 29 09/16/2024 1424    BUN 14 09/16/2024 1424    CREATININE 1.0 09/16/2024 1424     09/16/2024 1424        Component Value Date/Time    CALCIUM 9.9 09/16/2024 1424    ALKPHOS 85 09/16/2024 1424    AST 17 09/16/2024 1424    ALT 22 09/16/2024 1424    BILITOT 0.5 09/16/2024 1424    ESTGFRAFRICA >60 11/23/2021 0708    EGFRNONAA >60 11/23/2021 0708            Lab Results   Component Value Date    CHOL 118 (L) 09/16/2024    CHOL 152 08/05/2022    CHOL 209 (H) 11/23/2021     Lab Results   Component Value Date    HDL 42 09/16/2024    HDL 52 08/05/2022    HDL 43 11/23/2021     Lab Results   Component Value Date    LDLCALC 59.8 (L) 09/16/2024    LDLCALC 89.2 08/05/2022    LDLCALC 148.0 11/23/2021     Lab Results   Component Value Date    TRIG 81 09/16/2024    TRIG 54 08/05/2022    TRIG 90 11/23/2021     Lab Results   Component Value Date    CHOLHDL 35.6 09/16/2024    CHOLHDL 34.2  08/05/2022    CHOLHDL 20.6 11/23/2021       Lab Results   Component Value Date    TSH 0.461 11/06/2024       Lab Results   Component Value Date    HGBA1C 5.7 (H) 09/16/2024         Assessment:     1. Heart failure with mildly reduced ejection fraction    2. Benign essential hypertension    3. Aortic atherosclerosis    4. Mixed hyperlipidemia    5. Severe obesity (BMI 35.0-39.9) with comorbidity    6. Moderate to severe YORDAN        Plan:     W/R/T the colonoscopy, e has no angina, heart failure, or unstable arrhythmia.  No further cardiovascular testing is required prior to proceeding to the operating room.      Continue current medicines.  Add Toprol 25 (his HR is only 66 today) and valsartan 160.  If any low BPs occur, I would dial back HCTZ and/or amlodipine.    Given NYHA class 1 symptoms, I do not see an overwhelming indication for Entresto, Jardiance/Farxiga, or even spironolactone yet, but I would have a low threshold to add the SGLT-2i in the future especially given the prediabetes A1c.    Diet/exercise goals reinforced.    F/U 6 months

## 2025-03-31 ENCOUNTER — PATIENT MESSAGE (OUTPATIENT)
Dept: ADMINISTRATIVE | Facility: HOSPITAL | Age: 60
End: 2025-03-31
Payer: MEDICARE

## 2025-04-02 ENCOUNTER — TELEPHONE (OUTPATIENT)
Dept: ADMINISTRATIVE | Facility: HOSPITAL | Age: 60
End: 2025-04-02
Payer: MEDICARE

## 2025-04-02 VITALS — SYSTOLIC BLOOD PRESSURE: 130 MMHG | DIASTOLIC BLOOD PRESSURE: 80 MMHG

## 2025-05-02 ENCOUNTER — OFFICE VISIT (OUTPATIENT)
Dept: INTERNAL MEDICINE | Facility: CLINIC | Age: 60
End: 2025-05-02
Payer: MEDICARE

## 2025-05-02 VITALS
TEMPERATURE: 98 F | OXYGEN SATURATION: 97 % | HEIGHT: 69 IN | BODY MASS INDEX: 36.57 KG/M2 | RESPIRATION RATE: 12 BRPM | WEIGHT: 246.94 LBS | DIASTOLIC BLOOD PRESSURE: 68 MMHG | HEART RATE: 89 BPM | SYSTOLIC BLOOD PRESSURE: 136 MMHG

## 2025-05-02 DIAGNOSIS — E66.01 SEVERE OBESITY (BMI 35.0-39.9) WITH COMORBIDITY: ICD-10-CM

## 2025-05-02 DIAGNOSIS — I50.22 HEART FAILURE WITH MILDLY REDUCED EJECTION FRACTION: ICD-10-CM

## 2025-05-02 DIAGNOSIS — Z71.89 GRIEF COUNSELING: ICD-10-CM

## 2025-05-02 DIAGNOSIS — G47.33 OSA (OBSTRUCTIVE SLEEP APNEA): ICD-10-CM

## 2025-05-02 DIAGNOSIS — Z00.00 ENCOUNTER FOR MEDICARE ANNUAL WELLNESS EXAM: Primary | ICD-10-CM

## 2025-05-02 DIAGNOSIS — I70.0 AORTIC ATHEROSCLEROSIS: ICD-10-CM

## 2025-05-02 DIAGNOSIS — J45.21 MILD INTERMITTENT REACTIVE AIRWAY DISEASE WITH ACUTE EXACERBATION: ICD-10-CM

## 2025-05-02 DIAGNOSIS — I10 BENIGN ESSENTIAL HYPERTENSION: ICD-10-CM

## 2025-05-02 DIAGNOSIS — M54.16 LUMBAR BACK PAIN WITH RADICULOPATHY AFFECTING RIGHT LOWER EXTREMITY: ICD-10-CM

## 2025-05-02 PROCEDURE — 3075F SYST BP GE 130 - 139MM HG: CPT | Mod: CPTII,S$GLB,,

## 2025-05-02 PROCEDURE — 3078F DIAST BP <80 MM HG: CPT | Mod: CPTII,S$GLB,,

## 2025-05-02 PROCEDURE — 99999 PR PBB SHADOW E&M-EST. PATIENT-LVL V: CPT | Mod: PBBFAC,,,

## 2025-05-02 PROCEDURE — G0439 PPPS, SUBSEQ VISIT: HCPCS | Mod: S$GLB,,,

## 2025-05-02 PROCEDURE — 4010F ACE/ARB THERAPY RXD/TAKEN: CPT | Mod: CPTII,S$GLB,,

## 2025-05-02 NOTE — PROGRESS NOTES
"  Stevenson Robison presented for a follow-up Medicare AWV today. The following components were reviewed and updated:    Medical history  Family History  Social history  Allergies and Current Medications  Health Risk Assessment  Health Maintenance  Care Team    **See Completed Assessments for Annual Wellness visit with in the encounter summary    The following assessments were completed:  Depression Screening  Cognitive function Screening    Timed Get Up Test  Whisper Test      Opioid documentation:      Patient does have a current opioid prescription.      Patient accepted further discussion regarding opioid medication use.      Patient is currently taking hydrocodone narcotic for back pain.        Pain level today is 8/10.       In addition to narcotic pain medications, patient was using acupuncture, and physical therapy; insurance no longer covers acupuncture.       Patient is followed by a specialist currently for their pain and will not be referred today.       Patient's opioid risk potential based on ORT-OUD tool:       Neo each box that applies   No   Yes     Family history of substance abuse   Alcohol [x] []   Illegal drugs [x] []   Rx drugs [x] []     Personal history of substance abuse   Alcohol [x] []   Illegal drugs [x] []   Rx drugs [x] []     Age between 16-45 years   [x]   []     Patient with ADD, OCD, Bipolar disorder, schizoprenia   [x]   []     Patient with depression   [x]   []                         Scoring total                              0                                   Non-opioid treatment options have been discussed today and added to the patient's after visit summary.          Vitals:    05/02/25 1103   BP: 136/68   BP Location: Left arm   Patient Position: Sitting   Pulse: 89   Resp: 12   Temp: 98 °F (36.7 °C)   TempSrc: Temporal   SpO2: 97%   Weight: 112 kg (246 lb 14.6 oz)   Height: 5' 9" (1.753 m)     Body mass index is 36.46 kg/m².       Physical Exam  Vitals reviewed. "   Constitutional:       Appearance: Normal appearance.   HENT:      Head: Normocephalic and atraumatic.   Cardiovascular:      Rate and Rhythm: Normal rate and regular rhythm.      Pulses: Normal pulses.           Radial pulses are 2+ on the right side and 2+ on the left side.        Dorsalis pedis pulses are 2+ on the right side and 2+ on the left side.        Posterior tibial pulses are 2+ on the right side and 2+ on the left side.      Heart sounds: Normal heart sounds.   Pulmonary:      Effort: Pulmonary effort is normal.      Breath sounds: Normal breath sounds.   Musculoskeletal:      Right lower leg: No edema.      Left lower leg: No edema.   Skin:     General: Skin is warm and dry.      Capillary Refill: Capillary refill takes less than 2 seconds.   Neurological:      General: No focal deficit present.      Mental Status: He is alert and oriented to person, place, and time.   Psychiatric:         Mood and Affect: Mood normal.         Behavior: Behavior normal.       Diagnoses and health risks identified today and associated recommendations/orders:  1. Encounter for Medicare annual wellness exam  Assessment and evaluation performed as stated above  - Ambulatory Referral/Consult to Enhanced Annual Wellness Visit (eAWV)    2. Lumbar back pain with radiculopathy affecting bilateral lower extremity  Chronic, stable. Taking Norco as needed.  Followed by Outside pain medicine, Dr. Ponce.    3. Mild intermittent reactive airway disease with acute exacerbation  Stable with prn use of albuterol.  F/u with pcp.    4. Heart failure with mildly reduced ejection fraction  Stable on metoprolol.  F/u with cardiology    5. Benign essential hypertension  Stable on valsartan, metoprolol succinate, HCTZ, amlodipine.  Patient to follow-up with PCP and Cardiology    6. Aortic atherosclerosis  Stable on atorvastatin.  Follow up with PCP    7. Severe obesity (BMI 35.0-39.9) with comorbidity  Stable.  Encouraged patient to increase  activity and reduce caloric intake.  Follow-up with PCP.    8. Moderate to severe YORDAN  Stable.  Reiterated importance of adherence to CPAP use and its importance.  Follow up with sleep medicine    9. Grief counseling  - Ambulatory referral/consult to Psychology; Future      Provided Stevenson with a 5-10 year written screening schedule and personal prevention plan. Recommendations were developed using the USPSTF age appropriate recommendations. Education, counseling, and referrals were provided as needed.  After Visit Summary printed and given to patient which includes a list of additional screenings\tests needed.    Follow up in about 1 year (around 5/2/2026), or if symptoms worsen or fail to improve.      Karla Cleary, JOSE    I offered to discuss advanced care planning, including how to pick a person who would make decisions for you if you were unable to make them for yourself, called a health care power of , and what kind of decisions you might make such as use of life sustaining treatments such as ventilators and tube feeding when faced with a life limiting illness recorded on a living will that they will need to know. (How you want to be cared for as you near the end of your natural life)     X Patient is interested in learning more about how to make advanced directives.  I provided them paperwork and offered to discuss this with them.

## 2025-05-02 NOTE — PATIENT INSTRUCTIONS
Counseling and Referral of Other Preventative  (Italic type indicates deductible and co-insurance are waived)    Patient Name: Stevenson Robison  Today's Date: 5/2/2025    Health Maintenance         Date Due Completion Date    Pneumococcal Vaccines (Age 50+) (1 of 2 - PCV) Never done ---    Shingles Vaccine (1 of 2) Never done ---    Colorectal Cancer Screening 02/23/2022 2/23/2017    COVID-19 Vaccine (3 - 2024-25 season) 09/01/2024 8/20/2021    RSV Vaccine (Age 60+ and Pregnant patients) (1 - Risk 60-74 years 1-dose series) Never done ---    Influenza Vaccine (Season Ended) 09/01/2025 10/8/2023    PROSTATE-SPECIFIC ANTIGEN 09/16/2025 9/16/2024    High Dose Statin 05/02/2026 5/2/2025    Hemoglobin A1c (Diabetic Prevention Screening) 09/16/2027 9/16/2024    TETANUS VACCINE 10/18/2028 10/18/2018    Lipid Panel 09/16/2029 9/16/2024          No orders of the defined types were placed in this encounter.      The following information is provided to all patients.  This information is to help you find resources for any of the problems found today that may be affecting your health:                  Living healthy guide: www.[facility].[place].gov      Understanding Diabetes: www.diabetes.org      Eating healthy: www.cdc.gov/healthyweight      CDC home safety checklist: www.cdc.gov/steadi/patient.html      Agency on Aging: www.goea.[place].gov      Alcoholics anonymous (AA): www.aa.org      Physical Activity: www.giovany.nih.gov/na5zwpq      Tobacco use: www.quitwithusla.org

## 2025-05-13 ENCOUNTER — NURSE TRIAGE (OUTPATIENT)
Dept: ADMINISTRATIVE | Facility: CLINIC | Age: 60
End: 2025-05-13
Payer: MEDICARE

## 2025-05-14 ENCOUNTER — OFFICE VISIT (OUTPATIENT)
Dept: INTERNAL MEDICINE | Facility: CLINIC | Age: 60
End: 2025-05-14
Payer: MEDICARE

## 2025-05-14 VITALS
HEIGHT: 69 IN | OXYGEN SATURATION: 98 % | BODY MASS INDEX: 36.67 KG/M2 | HEART RATE: 89 BPM | WEIGHT: 247.56 LBS | DIASTOLIC BLOOD PRESSURE: 84 MMHG | SYSTOLIC BLOOD PRESSURE: 148 MMHG

## 2025-05-14 DIAGNOSIS — Z88.0 PENICILLIN ALLERGY: Primary | ICD-10-CM

## 2025-05-14 DIAGNOSIS — K04.7 DENTAL ABSCESS: ICD-10-CM

## 2025-05-14 PROCEDURE — 4010F ACE/ARB THERAPY RXD/TAKEN: CPT | Mod: CPTII,HCNC,S$GLB,

## 2025-05-14 PROCEDURE — 1159F MED LIST DOCD IN RCRD: CPT | Mod: CPTII,HCNC,S$GLB,

## 2025-05-14 PROCEDURE — 3008F BODY MASS INDEX DOCD: CPT | Mod: CPTII,HCNC,S$GLB,

## 2025-05-14 PROCEDURE — 99213 OFFICE O/P EST LOW 20 MIN: CPT | Mod: HCNC,S$GLB,,

## 2025-05-14 PROCEDURE — 99999 PR PBB SHADOW E&M-EST. PATIENT-LVL III: CPT | Mod: PBBFAC,HCNC,,

## 2025-05-14 PROCEDURE — 3077F SYST BP >= 140 MM HG: CPT | Mod: CPTII,HCNC,S$GLB,

## 2025-05-14 PROCEDURE — 3079F DIAST BP 80-89 MM HG: CPT | Mod: CPTII,HCNC,S$GLB,

## 2025-05-14 RX ORDER — CLINDAMYCIN HYDROCHLORIDE 300 MG/1
300 CAPSULE ORAL EVERY 8 HOURS
Qty: 15 CAPSULE | Refills: 0 | Status: SHIPPED | OUTPATIENT
Start: 2025-05-14

## 2025-05-14 NOTE — TELEPHONE ENCOUNTER
Pt calling with c/o rash on body on lower legs and feet. Pt started on Amoxicillin 5 days ago and he was allergic as a child but hasn't had any trouble till today when he took his jeans off. Pt triaged and care advice to see MD within 24 hours and appt made for 9am. Pt to call back if any SOB, swelling throat trouble swallowing.                Reason for Disposition   Taking new prescription antibiotic  (Exception: Finished taking new prescription antibiotic.)    Additional Information   Negative: [1] Life-threatening reaction (anaphylaxis) in the past to similar substance (e.g., food, insect bite/sting, chemical, etc.) AND [2] < 2 hours since exposure   Negative: [1] Sudden onset of rash (within last 2 hours) AND [2] difficulty breathing or swallowing   Negative: Shock suspected (e.g., cold/pale/clammy skin, too weak to stand, low BP, rapid pulse)   Negative: Difficult to awaken or acting confused (e.g., disoriented, slurred speech)   Negative: [1] Purple or blood-colored spots or dots AND [2] fever   Negative: Sounds like a life-threatening emergency to the triager   [1] Drug rash suspected AND [2] started taking new medicine within last 2 weeks  (Exception: Antihistamine, eye drops, ear drops, decongestant or other OTC cough/cold medicines.)   Negative: [1] Life-threatening reaction (anaphylaxis) in the past to the same drug AND [2] < 2 hours since exposure   Negative: Difficulty breathing or wheezing   Negative: [1] Hoarseness or cough AND [2] started soon after 1st dose of drug   Negative: [1] Swollen tongue AND [2] started soon after 1st dose of drug   Negative: [1] Purple or blood-colored rash (spots or dots) AND [2] fever   Negative: Sounds like a life-threatening emergency to the triager   Negative: Swollen tongue   Negative: [1] Widespread hives AND [2] onset < 2 hours of exposure to 1st dose of drug   Negative: Fever   Negative: Patient sounds very sick or weak to the triager   Negative: [1] Purple or  blood-colored rash (spots or dots) AND [2] no fever AND [3] sounds well to triager   Negative: [1] Taking new prescription medication AND [2] rash within 4 hours of 1st dose   Negative: Large or small blisters on skin (i.e., fluid filled bubbles or sacs)   Negative: Bloody crusts on lips or sores in mouth   Negative: Face or lip swelling   Negative: Hives or itching    Protocols used: Rash or Redness - Widespread-A-AH, Rash - Widespread On Drugs-A-AH

## 2025-05-14 NOTE — PROGRESS NOTES
CHIEF COMPLAINT     Chief Complaint   Patient presents with    Allergic Reaction     Reaction to amoxicillin.         HPI     Stevenson Robison Jr. is a 60 y.o. male who presents for xxx today.    PCP is Ty Rodríguez MD, patient is known to me. This note was generated with the assistance of ambient listening technology. Verbal consent was obtained by the patient and accompanying visitor(s) for the recording of patient appointment to facilitate this note. I attest to having reviewed and edited the generated note for accuracy, though some syntax or spelling errors may persist. Please contact the author of this note for any clarification.     History of Present Illness    CHIEF COMPLAINT:  Patient presents today with allergic reaction to amoxicillin    ALLERGIC REACTION:  He developed an allergic reaction on day 5 of amoxicillin therapy, manifesting as rash on feet, face, and neck, with diffuse pruritus and erythema of bilateral legs. He discontinued the medication after recognizing it was penicillin-based, to which he has a known allergy. He did not take his eveniong dose yesterday or this morning's dose.     SALIVARY GLAND INFECTION:  He developed a salivary gland cyst/abscess following dental work. The abscess has significantly reduced to approximately one-quarter of its original size after starting amoxicillin on Friday. His dentist prescribed the amoxicillin, unaware that pt has an allergy.      ROS:  General: -fever, -chills, -fatigue, -weight gain, -weight loss  Eyes: -vision changes, -redness, -discharge  ENT: -ear pain, -nasal congestion, -sore throat  Cardiovascular: -chest pain, -palpitations, -lower extremity edema  Respiratory: -cough, -shortness of breath  Gastrointestinal: -abdominal pain, -nausea, -vomiting, -diarrhea, -constipation, -blood in stool  Genitourinary: -dysuria, -hematuria, -frequency  Musculoskeletal: -joint pain, -muscle pain  Skin: +rash, -lesion, +itching, +skin  redness  Neurological: -headache, -dizziness, -numbness, -tingling  Psychiatric: -anxiety, -depression, -sleep difficulty  Head: +swollen glands          Past Medical History:  Past Medical History:   Diagnosis Date    Asthma     Hypertension        Home Medications:  Prior to Admission medications    Medication Sig Start Date End Date Taking? Authorizing Provider   albuterol (PROVENTIL/VENTOLIN HFA) 90 mcg/actuation inhaler Inhale 1-2 puffs into the lungs every 6 (six) hours as needed for Wheezing. Rescue 9/16/24  Yes Ty Rodríguez MD   amLODIPine (NORVASC) 5 MG tablet Take 1.5 tablets (7.5 mg total) by mouth once daily. 12/12/24  Yes Ty Rodríguez MD   aspirin 81 MG Chew Take 81 mg by mouth once daily.   Yes Provider, Historical   atorvastatin (LIPITOR) 40 MG tablet Take 1 tablet (40 mg total) by mouth once daily. 11/18/24  Yes Ty Rodríguez MD   diclofenac sodium (VOLTAREN) 1 % Gel Apply 2 g topically 4 (four) times daily. 11/6/24  Yes Cuauhtemoc Shay NP   hydroCHLOROthiazide (HYDRODIURIL) 25 MG tablet Take 1 tablet (25 mg total) by mouth once daily. 11/19/24  Yes Ty Rodríguez MD   hydrocodone-acetaminophen 10-325mg (NORCO)  mg Tab Take 1 tablet by mouth 3 (three) times daily as needed.   Yes Provider, Historical   metoprolol succinate (TOPROL-XL) 25 MG 24 hr tablet Take 1 tablet (25 mg total) by mouth once daily. 2/11/25 2/11/26 Yes Martín Vee MD   pantoprazole (PROTONIX) 40 MG tablet Take 1 tablet (40 mg total) by mouth before breakfast. 9/16/24  Yes Ty Rodríguez MD   triamcinolone acetonide 0.1% (KENALOG) 0.1 % ointment Apply topically 2 (two) times daily. 9/16/24  Yes Ty Rodríguez MD   valsartan (DIOVAN) 160 MG tablet Take 1 tablet (160 mg total) by mouth once daily. 2/11/25 2/11/26 Yes Martín Vee MD   clindamycin (CLEOCIN) 300 MG capsule Take 1 capsule (300 mg total) by mouth every 8 (eight) hours. 5/14/25   Cuauhtemoc Shay, NP  "      Review of Systems:  Review of Systems   Constitutional: Negative.    Respiratory: Negative.     Cardiovascular: Negative.    Skin:  Positive for rash.       Health Maintainence:   Immunizations:  Health Maintenance         Date Due Completion Date    Pneumococcal Vaccines (Age 50+) (1 of 2 - PCV) Never done ---    Shingles Vaccine (1 of 2) Never done ---    Colorectal Cancer Screening 02/23/2022 2/23/2017    COVID-19 Vaccine (3 - 2024-25 season) 09/01/2024 8/20/2021    RSV Vaccine (Age 60+ and Pregnant patients) (1 - Risk 60-74 years 1-dose series) Never done ---    Influenza Vaccine (Season Ended) 09/01/2025 10/8/2023    PROSTATE-SPECIFIC ANTIGEN 09/16/2025 9/16/2024    High Dose Statin 05/02/2026 5/2/2025    Hemoglobin A1c (Diabetic Prevention Screening) 09/16/2027 9/16/2024    TETANUS VACCINE 10/18/2028 10/18/2018    Lipid Panel 09/16/2029 9/16/2024             PHYSICAL EXAM     BP (!) 148/84   Pulse 89   Ht 5' 9" (1.753 m)   Wt 112.3 kg (247 lb 9.2 oz)   SpO2 98%   BMI 36.56 kg/m²     Physical Exam  Vitals reviewed.   Constitutional:       Appearance: He is well-developed.   HENT:      Head: Normocephalic and atraumatic.   Eyes:      Conjunctiva/sclera: Conjunctivae normal.   Cardiovascular:      Rate and Rhythm: Normal rate.   Pulmonary:      Effort: Pulmonary effort is normal. No respiratory distress.   Skin:     General: Skin is warm and dry.      Findings: Rash present. Rash is purpuric and urticarial.      Comments: Rash is present on feet and legs, as well as face.   Neurological:      Mental Status: He is alert and oriented to person, place, and time.      Coordination: Coordination normal.   Psychiatric:         Behavior: Behavior normal.           ASSESSMENT/PLAN   Assessment & Plan    ADVERSE EFFECT OF PENICILLINS / PENICILLIN ALLERGY:  - Assessed patient's reaction to amoxicillin, confirming a true penicillin allergy based on symptoms of pruritus all over, urticarial rash on feet, and " "erythema on leg.  - Discontinued amoxicillin immediately to prevent progression to anaphylaxis.  - Discussed the difference between non-pruritic rash (non-allergic reaction) and true allergic reactions with potential for anaphylaxis.  - Prescribed diphenhydramine 25-50 mg every 4-6 hours for 24-48 hours to manage allergic symptoms.  - Advised patient that any "-cillin" antibiotic is penicillin-based and should be avoided going forward.    ABSCESS OF SALIVARY GLAND:  - Noted significant improvement in the salivary gland abscess, which has reduced from large size to quarter size after initial amoxicillin therapy.  - Due to confirmed penicillin allergy, switching treatment to clindamycin 300 mg, 1 capsule 3 times daily (approximately every 8 hours) for 5 days to continue treating the oral abscess.    FOLLOW-UP:  - Follow up for BP recheck due to elevated blood pressure noted during today's visit.          Stevenson Roche" was seen today for allergic reaction.    Diagnoses and all orders for this visit:    Penicillin allergy    Dental abscess  -     clindamycin (CLEOCIN) 300 MG capsule; Take 1 capsule (300 mg total) by mouth every 8 (eight) hours.          Cuauhtemoc Shay NP   Department of Internal Medicine - [facility]  9:14 AM  "

## 2025-06-09 ENCOUNTER — PATIENT MESSAGE (OUTPATIENT)
Dept: ADMINISTRATIVE | Facility: HOSPITAL | Age: 60
End: 2025-06-09
Payer: MEDICARE

## 2025-06-18 VITALS — SYSTOLIC BLOOD PRESSURE: 130 MMHG | DIASTOLIC BLOOD PRESSURE: 85 MMHG

## 2025-07-21 NOTE — PROGRESS NOTES
Subjective:       Patient ID: Stevenson Robison Jr. is a 60 y.o. male with a history of lumbar radiculopathy, mild reactive airway disease, HLD, HTN, heart failure with mildly reduced EF, aortic atherosclerosis, YORDAN.     Chief Complaint: Urinary frequency [R35.0]    Patient is new to me, PCP is Dr. Ty Rodríguez. Here today for the following:    History of Present Illness    CHIEF COMPLAINT:  Patient presents today for pre-operative clearance and urinary symptoms    URINARY SYMPTOMS:  He reports one week of urinary symptoms including burning sensation during and after urination, and altered urinary stream characterized by spraying with a sensation of incomplete emptying which is quite uncomfortable. His fiancé has noted dark, foamy urine. He has sought relief through multiple hot showers which provide temporary comfort. Similar symptoms occurred 4-5 years ago, which were treated with antibiotics. He denies hematuria, discharge, trauma, or excess exercise. He is uncertain if his back pain is related to urinary symptoms or from a separate source. He does not prior history of BPH.    CARDIOVASCULAR:  He has known reduced left ventricular function around 40% and is followed by cardiologist Dr. Lyles, who has provided medical clearance for colonoscopy in 02/25. He denies personal history of heart attacks but reports significant family history with two brothers affected. He has undergone stress echocardiogram in 09/23 which was negative for ischemia. He has not had any change in ROBERTS symptoms described to Dr. Vee since his last visit and his blood pressure has improved.     PRE-OPERATIVE ASSESSMENT:  He is scheduled for nerve ablation surgery targeting back nerves to be performed by Dr. Holbrook.  He received an injection approximately one month ago which did not provide adequate pain relief, leading to the planned nerve ablation.     Procedure: Ablation of spinal nerves.   Surgeon: Dr. Leonid Ponce  Date: TBD - previously  on 08/15/25.     Indications: Chronic low back pain, persistent    DASI scale: 24.2 points  METs: 5.72    Alcohol use: No  Tobacco use: No  Chronic narcotic use: No    Anti-coagulant therapy: No  Anti-platelet therapy: ASA 81 mg    YORDAN: Yes, uses CPAP.  Prior anesthesia: Yes, no complications.     Follow up labs/imaging/EKG: EKG, labs  Clearance from another provider: Not required unless new symptoms present    Discussed with patient that they have a Revised Cardiac Risk Index for Pre-Operative Risk of 1.1 % 30-day risk of death, MI, or cardiac arrest. Patient verbalized understanding.     ROS:  General: -fever, -chills, -fatigue, -weight gain, -weight loss  Eyes: -vision changes, -redness, -discharge  ENT: -ear pain, -nasal congestion, -sore throat  Cardiovascular: -chest pain, -palpitations, -lower extremity edema  Respiratory: -cough, -shortness of breath  Gastrointestinal: -abdominal pain, -nausea, -vomiting, -diarrhea, -constipation, -blood in stool  Genitourinary: +dysuria, -hematuria, -frequency, +painful urination, +post-urination dribbling, +difficulty urinating, +abnormal urine appearance  Musculoskeletal: -joint pain, -muscle pain, +back pain  Skin: +rash, -lesion, +excessive sweating  Neurological: -headache, -dizziness, -numbness, -tingling  Psychiatric: -anxiety, -depression, -sleep difficulty          Current Outpatient Medications   Medication Instructions    albuterol (PROVENTIL/VENTOLIN HFA) 90 mcg/actuation inhaler 1-2 puffs, Inhalation, Every 6 hours PRN, Rescue    amLODIPine (NORVASC) 7.5 mg, Oral, Daily    aspirin 81 mg, Daily    atorvastatin (LIPITOR) 40 mg, Oral, Daily    clindamycin (CLEOCIN) 300 mg, Oral, Every 8 hours    diclofenac sodium (VOLTAREN) 2 g, Topical (Top), 4 times daily    hydroCHLOROthiazide (HYDRODIURIL) 25 mg, Oral, Daily    hydrocodone-acetaminophen 10-325mg (NORCO)  mg Tab 1 tablet, 3 times daily PRN    metoprolol succinate (TOPROL-XL) 25 mg, Oral, Daily     "pantoprazole (PROTONIX) 40 mg, Oral, Before breakfast    triamcinolone acetonide 0.1% (KENALOG) 0.1 % ointment Topical (Top), 2 times daily    valsartan (DIOVAN) 160 mg, Oral, Daily     Objective:      Vitals:    07/22/25 1336   BP: (!) 140/74   Pulse: 104   SpO2: 99%   Weight: 113 kg (249 lb 1.9 oz)   Height: 5' 9" (1.753 m)   PainSc: 10-Worst pain ever     Body mass index is 36.79 kg/m².    Physical Exam  Constitutional:       General: He is not in acute distress.     Appearance: He is well-developed. He is not diaphoretic.   HENT:      Head: Normocephalic and atraumatic.   Eyes:      General: No scleral icterus.        Right eye: No discharge.         Left eye: No discharge.   Neck:      Thyroid: No thyromegaly.      Trachea: No tracheal deviation.   Cardiovascular:      Rate and Rhythm: Normal rate and regular rhythm.      Heart sounds: Normal heart sounds. No murmur heard.  Pulmonary:      Effort: Pulmonary effort is normal. No respiratory distress.      Breath sounds: Normal breath sounds. No wheezing.   Abdominal:      Tenderness: There is right CVA tenderness and left CVA tenderness.   Musculoskeletal:         General: No deformity.      Cervical back: Neck supple.      Right lower leg: No edema.      Left lower leg: No edema.   Lymphadenopathy:      Cervical: No cervical adenopathy.   Skin:     General: Skin is warm and dry.      Findings: No erythema.   Neurological:      Mental Status: He is alert and oriented to person, place, and time. Mental status is at baseline.      Gait: Gait normal.   Psychiatric:         Mood and Affect: Mood normal.         Behavior: Behavior normal.         Thought Content: Thought content normal.         Judgment: Judgment normal.         Assessment:       1. Urinary frequency    2. Flank pain    3. Contact dermatitis, unspecified contact dermatitis type, unspecified trigger    4. Preop examination        IMPRESSION:  - Assessed for pre-operative clearance for spinal nerve " ablation procedure.  - Evaluated urinary symptoms suggestive of possible UTI or acute prostatitis. Consider nephrolithiasis.   - Considered cardiac history, including reduced EF.  - Determined EKG and bloodwork necessary prior to procedure clearance.  - Decided against immediate cardiology referral given no significant changes since last cardiology visit.    Plan:     PLAN SUMMARY:  - Ordered urinalysis with culture and sensitivity  - Ordered labs to assess renal function  - Ordered EKG  - Refilled Kenalog cream for dermatitis treatment  - Recommend patient treat any infection prior to procedure.   - Follow up on lab results    Urinary frequency  - Patient reports burning sensation during and after urination, discomfort, spraying instead of a stream, and urine described as bubbly, foamy, and very dark.  - Performed urinalysis to check for UTI or other issues that may complicate pre-operative clearance.  - Ordered urinalysis with culture and sensitivity and labs to assess renal function.  - Will resolve any infection before surgery and follow up on lab results.  - Consider imaging if symptoms persist without evidence of infection.  -     POCT URINE DIPSTICK WITHOUT MICROSCOPE  -     Urinalysis, Reflex to Urine Culture Urine, Clean Catch  -     CBC Auto Differential; Future; Expected date: 07/22/2025  -     Comprehensive Metabolic Panel; Future; Expected date: 07/22/2025    Flank pain  -     POCT URINE DIPSTICK WITHOUT MICROSCOPE  -     Urinalysis, Reflex to Urine Culture Urine, Clean Catch    Contact dermatitis, unspecified contact dermatitis type, unspecified trigger  -     triamcinolone acetonide 0.1% (KENALOG) 0.1 % ointment; Apply topically 2 (two) times daily.  Dispense: 80 g; Refill: 0    Preop examination  - Patient has history of slightly reduced EF in the left ventricle with no significant changes in symptoms since last cardiology visit (no chest pain, shortness of breath, or heart failure symptoms  reported).  - Ordered EKG for clearance due to history of reduced EF. Do not think cardiology clearance is required for this procedure.   - Patient instructed to contact office if chest pain or worsening shortness of breath develops.  - This is considered a a lower to intermediate risk procedure.  There METs is considered fair, and is related to their current condition that that is going to be treated with the surgery.  At this time we should rule out any acute infection that might complicate this procedure.  We will consider referral to Urology in future.   -     SCHEDULED EKG 12-LEAD (to Muse); Future  -     CBC Auto Differential; Future; Expected date: 07/22/2025  -     Comprehensive Metabolic Panel; Future; Expected date: 07/22/2025        58 minutes were spent in chart review, documentation and review of results, and evaluation, treatment, and counseling of patient on the same day of service. This note was generated with the assistance of ambient listening technology. Verbal consent was obtained by the patient and accompanying visitor(s) for the recording of patient appointment to facilitate this note. I attest to having reviewed and edited the generated note for accuracy, though some syntax or spelling errors may persist. Please contact the author of this note for any clarification.    Kaushik Torres PA-C    7/22/2025

## 2025-07-22 ENCOUNTER — OFFICE VISIT (OUTPATIENT)
Dept: INTERNAL MEDICINE | Facility: CLINIC | Age: 60
End: 2025-07-22
Payer: MEDICARE

## 2025-07-22 ENCOUNTER — TELEPHONE (OUTPATIENT)
Dept: INTERNAL MEDICINE | Facility: CLINIC | Age: 60
End: 2025-07-22
Payer: MEDICARE

## 2025-07-22 ENCOUNTER — HOSPITAL ENCOUNTER (OUTPATIENT)
Dept: CARDIOLOGY | Facility: OTHER | Age: 60
Discharge: HOME | End: 2025-07-22
Attending: INTERNAL MEDICINE
Payer: MEDICARE

## 2025-07-22 VITALS
DIASTOLIC BLOOD PRESSURE: 74 MMHG | SYSTOLIC BLOOD PRESSURE: 140 MMHG | OXYGEN SATURATION: 99 % | BODY MASS INDEX: 36.9 KG/M2 | HEART RATE: 104 BPM | WEIGHT: 249.13 LBS | HEIGHT: 69 IN

## 2025-07-22 DIAGNOSIS — R10.9 FLANK PAIN: ICD-10-CM

## 2025-07-22 DIAGNOSIS — Z01.818 PREOP EXAMINATION: ICD-10-CM

## 2025-07-22 DIAGNOSIS — R35.0 URINARY FREQUENCY: Primary | ICD-10-CM

## 2025-07-22 DIAGNOSIS — L25.9 CONTACT DERMATITIS, UNSPECIFIED CONTACT DERMATITIS TYPE, UNSPECIFIED TRIGGER: ICD-10-CM

## 2025-07-22 LAB
BILIRUB SERPL-MCNC: NEGATIVE MG/DL
BILIRUB UR QL STRIP.AUTO: NEGATIVE
BLOOD URINE, POC: NEGATIVE
CLARITY UR: CLEAR
CLARITY, POC UA: NORMAL
COLOR UR AUTO: YELLOW
COLOR, POC UA: NORMAL
GLUCOSE UR QL STRIP: NEGATIVE
GLUCOSE UR QL STRIP: NEGATIVE
HGB UR QL STRIP: NEGATIVE
KETONES UR QL STRIP: NEGATIVE
KETONES UR QL STRIP: NEGATIVE
LEUKOCYTE ESTERASE UR QL STRIP: NEGATIVE
LEUKOCYTE ESTERASE URINE, POC: NEGATIVE
NITRITE UR QL STRIP: NEGATIVE
NITRITE, POC UA: NEGATIVE
OHS QRS DURATION: 144 MS
OHS QTC CALCULATION: 472 MS
PH UR STRIP: 5 [PH]
PH, POC UA: 5.5
PROT UR QL STRIP: NEGATIVE
PROTEIN, POC: NEGATIVE
SP GR UR STRIP: 1.02
SPECIFIC GRAVITY, POC UA: >=1.03
UROBILINOGEN UR STRIP-ACNC: NEGATIVE EU/DL
UROBILINOGEN, POC UA: NORMAL

## 2025-07-22 PROCEDURE — 99999 PR PBB SHADOW E&M-EST. PATIENT-LVL III: CPT | Mod: PBBFAC,HCNC,, | Performed by: COUNSELOR

## 2025-07-22 PROCEDURE — 3008F BODY MASS INDEX DOCD: CPT | Mod: CPTII,HCNC,S$GLB, | Performed by: COUNSELOR

## 2025-07-22 PROCEDURE — 93010 ELECTROCARDIOGRAM REPORT: CPT | Mod: HCNC,,, | Performed by: INTERNAL MEDICINE

## 2025-07-22 PROCEDURE — 1159F MED LIST DOCD IN RCRD: CPT | Mod: CPTII,HCNC,S$GLB, | Performed by: COUNSELOR

## 2025-07-22 PROCEDURE — 81002 URINALYSIS NONAUTO W/O SCOPE: CPT | Mod: HCNC,S$GLB,, | Performed by: COUNSELOR

## 2025-07-22 PROCEDURE — 3077F SYST BP >= 140 MM HG: CPT | Mod: CPTII,HCNC,S$GLB, | Performed by: COUNSELOR

## 2025-07-22 PROCEDURE — 3078F DIAST BP <80 MM HG: CPT | Mod: CPTII,HCNC,S$GLB, | Performed by: COUNSELOR

## 2025-07-22 PROCEDURE — 4010F ACE/ARB THERAPY RXD/TAKEN: CPT | Mod: CPTII,HCNC,S$GLB, | Performed by: COUNSELOR

## 2025-07-22 PROCEDURE — 93005 ELECTROCARDIOGRAM TRACING: CPT | Mod: HCNC

## 2025-07-22 PROCEDURE — 81003 URINALYSIS AUTO W/O SCOPE: CPT | Mod: HCNC | Performed by: COUNSELOR

## 2025-07-22 PROCEDURE — 99215 OFFICE O/P EST HI 40 MIN: CPT | Mod: HCNC,S$GLB,, | Performed by: COUNSELOR

## 2025-07-22 PROCEDURE — 87086 URINE CULTURE/COLONY COUNT: CPT | Mod: HCNC | Performed by: COUNSELOR

## 2025-07-22 RX ORDER — TRIAMCINOLONE ACETONIDE 1 MG/G
OINTMENT TOPICAL 2 TIMES DAILY
Qty: 80 G | Refills: 0 | Status: SHIPPED | OUTPATIENT
Start: 2025-07-22

## 2025-07-22 NOTE — TELEPHONE ENCOUNTER
Spoke to [facility]  abhijit and after explaing what the provider was requesting,the  notified our staff that they can add the urine high risk.  Provided  with my first and last name to be able to secure chat or teams our staff regarding the pt lab request

## 2025-07-23 ENCOUNTER — TELEPHONE (OUTPATIENT)
Dept: INTERNAL MEDICINE | Facility: CLINIC | Age: 60
End: 2025-07-23
Payer: MEDICARE

## 2025-07-23 ENCOUNTER — RESULTS FOLLOW-UP (OUTPATIENT)
Dept: INTERNAL MEDICINE | Facility: CLINIC | Age: 60
End: 2025-07-23
Payer: MEDICARE

## 2025-07-23 DIAGNOSIS — R35.0 URINARY FREQUENCY: Primary | ICD-10-CM

## 2025-07-23 NOTE — TELEPHONE ENCOUNTER
----- Message from Jarad Torres PA-C sent at 7/23/2025 11:10 AM CDT -----  Please help patient schedule with urology. Sooner is better. Thank you!  ----- Message -----  From: Tammy Simpson CMA  Sent: 7/22/2025   2:37 PM CDT  To: Jarad Torres PA-C

## 2025-07-24 ENCOUNTER — TELEPHONE (OUTPATIENT)
Dept: UROLOGY | Facility: CLINIC | Age: 60
End: 2025-07-24
Payer: MEDICARE

## 2025-07-24 LAB — BACTERIA UR CULT: NORMAL

## 2025-07-28 ENCOUNTER — TELEPHONE (OUTPATIENT)
Dept: UROLOGY | Facility: CLINIC | Age: 60
End: 2025-07-28
Payer: MEDICARE

## 2025-07-28 ENCOUNTER — PATIENT OUTREACH (OUTPATIENT)
Dept: ADMINISTRATIVE | Facility: HOSPITAL | Age: 60
End: 2025-07-28
Payer: MEDICARE

## 2025-07-28 ENCOUNTER — PATIENT MESSAGE (OUTPATIENT)
Dept: UROLOGY | Facility: CLINIC | Age: 60
End: 2025-07-28
Payer: MEDICARE

## 2025-07-28 NOTE — PROGRESS NOTES
Health Maintenance reviewed, updated and links triggered. Colorectal Screening due. ([facility]) 7/28/25  Left a voice mail for a call back for colorectal screening.

## 2025-07-30 ENCOUNTER — TELEPHONE (OUTPATIENT)
Dept: UROLOGY | Facility: CLINIC | Age: 60
End: 2025-07-30
Payer: MEDICARE

## 2025-08-04 ENCOUNTER — PATIENT MESSAGE (OUTPATIENT)
Dept: ADMINISTRATIVE | Facility: HOSPITAL | Age: 60
End: 2025-08-04
Payer: MEDICARE

## 2025-08-06 ENCOUNTER — TELEPHONE (OUTPATIENT)
Dept: CARDIOLOGY | Facility: CLINIC | Age: 60
End: 2025-08-06
Payer: MEDICARE

## 2025-08-06 ENCOUNTER — OFFICE VISIT (OUTPATIENT)
Dept: UROLOGY | Facility: CLINIC | Age: 60
End: 2025-08-06
Payer: MEDICARE

## 2025-08-06 ENCOUNTER — OFFICE VISIT (OUTPATIENT)
Dept: CARDIOLOGY | Facility: CLINIC | Age: 60
End: 2025-08-06
Payer: MEDICARE

## 2025-08-06 VITALS
HEART RATE: 71 BPM | BODY MASS INDEX: 36.96 KG/M2 | WEIGHT: 249.44 LBS | SYSTOLIC BLOOD PRESSURE: 142 MMHG | SYSTOLIC BLOOD PRESSURE: 163 MMHG | HEIGHT: 69 IN | DIASTOLIC BLOOD PRESSURE: 85 MMHG | DIASTOLIC BLOOD PRESSURE: 89 MMHG | BODY MASS INDEX: 36.94 KG/M2 | HEART RATE: 71 BPM | OXYGEN SATURATION: 96 % | WEIGHT: 249.56 LBS | HEIGHT: 69 IN

## 2025-08-06 DIAGNOSIS — R30.0 DYSURIA: ICD-10-CM

## 2025-08-06 DIAGNOSIS — I25.10 CORONARY ARTERY CALCIFICATION SEEN ON CT SCAN: Primary | ICD-10-CM

## 2025-08-06 DIAGNOSIS — I70.0 AORTIC ATHEROSCLEROSIS: ICD-10-CM

## 2025-08-06 DIAGNOSIS — R10.31 RIGHT LOWER QUADRANT ABDOMINAL PAIN: Primary | ICD-10-CM

## 2025-08-06 DIAGNOSIS — M54.9 BACK PAIN, UNSPECIFIED BACK LOCATION, UNSPECIFIED BACK PAIN LATERALITY, UNSPECIFIED CHRONICITY: ICD-10-CM

## 2025-08-06 DIAGNOSIS — Z01.810 PRE-OPERATIVE CARDIOVASCULAR EXAMINATION: ICD-10-CM

## 2025-08-06 DIAGNOSIS — R35.0 URINARY FREQUENCY: ICD-10-CM

## 2025-08-06 DIAGNOSIS — E78.2 MIXED HYPERLIPIDEMIA: ICD-10-CM

## 2025-08-06 DIAGNOSIS — N48.89 PAIN, PENILE: ICD-10-CM

## 2025-08-06 DIAGNOSIS — I50.22 HEART FAILURE WITH MILDLY REDUCED EJECTION FRACTION: ICD-10-CM

## 2025-08-06 DIAGNOSIS — I10 BENIGN ESSENTIAL HYPERTENSION: ICD-10-CM

## 2025-08-06 LAB
BILIRUBIN, UA POC OHS: NEGATIVE
BLOOD, UA POC OHS: ABNORMAL
CLARITY, UA POC OHS: CLEAR
COLOR, UA POC OHS: YELLOW
GLUCOSE, UA POC OHS: NEGATIVE
KETONES, UA POC OHS: NEGATIVE
LEUKOCYTES, UA POC OHS: NEGATIVE
NITRITE, UA POC OHS: NEGATIVE
PH, UA POC OHS: 5.5
POC RESIDUAL URINE VOLUME: NORMAL (ref 0–100)
PROTEIN, UA POC OHS: NEGATIVE
SPECIFIC GRAVITY, UA POC OHS: >=1.03
UROBILINOGEN, UA POC OHS: 0.2

## 2025-08-06 PROCEDURE — 99999 PR PBB SHADOW E&M-EST. PATIENT-LVL IV: CPT | Mod: PBBFAC,HCNC,,

## 2025-08-06 RX ORDER — ATORVASTATIN CALCIUM 20 MG/1
20 TABLET, FILM COATED ORAL DAILY
Qty: 30 TABLET | Refills: 11 | Status: SHIPPED | OUTPATIENT
Start: 2025-08-06

## 2025-08-06 RX ORDER — NAPROXEN 500 MG/1
500 TABLET ORAL 2 TIMES DAILY WITH MEALS
Qty: 28 TABLET | Refills: 0 | Status: SHIPPED | OUTPATIENT
Start: 2025-08-06 | End: 2025-08-06 | Stop reason: CLARIF

## 2025-08-06 RX ORDER — TAMSULOSIN HYDROCHLORIDE 0.4 MG/1
0.4 CAPSULE ORAL
Qty: 30 CAPSULE | Refills: 0 | Status: SHIPPED | OUTPATIENT
Start: 2025-08-06 | End: 2025-09-05

## 2025-08-06 RX ORDER — METOPROLOL SUCCINATE 50 MG/1
50 TABLET, EXTENDED RELEASE ORAL DAILY
Qty: 90 TABLET | Refills: 3 | Status: SHIPPED | OUTPATIENT
Start: 2025-08-06 | End: 2026-08-06
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
## 2025-08-06 NOTE — PROGRESS NOTES
"[facility]  Urology Clinic Note    Date of Service: 08/06/2025     CHIEF COMPLAINT: Right Flank Pain     History of Present Illness:   Stevenson Robison Jr. is a 60 y.o. male who presents to today for Right Flank Pain. He is a new patient to [facility], has not been seen since 2015.     He reports he has had back pain and right flank discomfort for about 4 weeks. The pain is now radiating to the lower abdomen. He also has dysuria when voiding.  Also a discomfort between scrotum and anus.   He does not have a personal hx of kidney stones. His brother often gets stones.  No gross hematuria.     He reports recently his flow has been week and not steady. Reports his stream splatters/sprays the last few weeks.   Usually he is okay with his FOS and does not have urinary complaints.   He states he feels a "Knot" on right side of penis. This is causing him discomfort especially when voiding.   He also reports a renal cyst that was found on imaging a few years back.     He has a hx of chronic back pain. He has a new cardiac hx of ventricular filling defect and reports his cardiologist would like him to refrain from NSAIDs.     Review of Symptoms:  Review of Systems   Constitutional:  Negative for chills and fever.   Respiratory:  Negative for shortness of breath and wheezing.    Cardiovascular:  Negative for chest pain.   Gastrointestinal:  Negative for abdominal pain, constipation, diarrhea, nausea and vomiting.   Genitourinary:  Positive for dysuria and flank pain. Negative for frequency, hematuria and urgency.        Abdominal discomfort, weak/spraying stream     Patient History:  Past Medical History:   Diagnosis Date    Asthma     Hypertension      Past Surgical History:   Procedure Laterality Date    BACK SURGERY      COLONOSCOPY N/A 02/23/2017    Procedure: COLONOSCOPY;  Surgeon: Wil Dimas MD;  Location: [facility] ([address] [place] [address]);  Service: Endoscopy;  Laterality: N/A;    decompression neck  2005    " "dental implant  06/2024    HERNIA REPAIR  2008    NASAL SEPTUM SURGERY  2005    SPINE SURGERY       Family History   Problem Relation Name Age of Onset    Lung disease Mother      Heart disease Father      Heart attack Father      Melanoma Father      No Known Problems Sister Gissel     Hypertension Brother Jeff     Melanoma Paternal Grandfather       Social History[1]  Allergies:  Advair diskus [fluticasone propion-salmeterol], Other omega-3s, and Penicillins  Medications:  Current Medications[2]    OBJECTIVE:     Vitals:    08/06/25 0911   BP: (!) 161/94   BP Location: Left arm   Patient Position: Sitting   Pulse: 71   Weight: 113.2 kg (249 lb 7.2 oz)   Height: 5' 9" (1.753 m)      Physical Exam  Constitutional:       Appearance: Normal appearance.   HENT:      Head: Normocephalic.   Pulmonary:      Effort: Pulmonary effort is normal. No respiratory distress.      Breath sounds: No wheezing.   Abdominal:      General: There is no distension.      Tenderness: There is no abdominal tenderness.   Genitourinary:     Penis: Circumcised. Lesions present.       Testes:         Right: Mass, tenderness or swelling not present.         Left: Mass, tenderness or swelling not present.      Comments: Testicular calcification palpated on right side of penis, behind penile head. Tender to touch  Neurological:      Mental Status: He is alert.   Psychiatric:         Mood and Affect: Mood normal.     LAB:      All laboratory values listed below was/were independently reviewed with patient at this clinic visit.    In office UA today was negative for infection, trace blood.     PVR done in office today immediately after urination by the nurse. PVR is 5 ml.     Lab Results   Component Value Date    BUN 20 07/22/2025    CREATININE 1.1 07/22/2025    WBC 9.30 07/22/2025    HGB 16.3 07/22/2025    HCT 48.4 07/22/2025     07/22/2025    AST 23 07/22/2025    ALT 26 07/22/2025    ALKPHOS 79 07/22/2025    ALBUMIN 4.4 07/22/2025    HGBA1C " 5.7 (H) 09/16/2024     Lab Results   Component Value Date    PSA 0.25 09/16/2024    PSA 0.29 06/15/2022     Lab Results   Component Value Date    CREATININE 1.1 07/22/2025    EGFRNORACEVR >60 07/22/2025     IMAGING:    All imaging listed below was/were independently reviewed with patient at this clinic visit.  3/24/23 US Retroperitoneal   Right kidney: The right kidney measures 12.1 cm. No cortical thinning. No loss of corticomedullary distinction. Resistive index measures 0.68.  No mass. No renal stone. No hydronephrosis.  Left kidney: The left kidney measures 12.8 cm. No cortical thinning. No loss of corticomedullary distinction. Resistive index measures 0.66.  Benign-appearing cyst in the lower pole..  No mass. No renal stone. No hydronephrosis.  The bladder is partially distended at the time of scanning and has an unremarkable appearance.    IMPRESSION:  Encounter Diagnoses   Name Primary?    Urinary frequency      ASSESSMENT/PLAN:     Plan: I spent a total of 45 minutes on the day of the visit. This includes face to face time and non-face to face time preparing to see the patient (eg, review of tests), obtaining and/or reviewing separately obtained history, documenting clinical information in the electronic or other health record, independently interpreting results and communicating results to the patient/family/caregiver, or care coordinator.  Reviewed the possible contributory factors.  Reviewed possible management if needed.  Recommendations for PCP follow up visit; any need to go to the ER.    Recommended lifestyle modifications with a proper, healthy diet, good hydration but during the day.  Benefits of regular exercise and weight loss.     Reassurance with today's in office UA.   Emptying bladder well.   Sent home with Rx for Flomax and a strainer while awaiting CT results.  CT RSS ordered and scheduled.   Follow up pending.     JHONY Parada           [1]   Social History  Socioeconomic History     Marital status: Significant Other   Tobacco Use    Smoking status: Former    Smokeless tobacco: Former   Substance and Sexual Activity    Alcohol use: No    Drug use: Yes     Types: Hydrocodone     Comment: prn prescription pain    Sexual activity: Yes     Partners: Female     Social Drivers of Health     Financial Resource Strain: Low Risk  (2/11/2025)    Overall Financial Resource Strain (CARDIA)     Difficulty of Paying Living Expenses: Not very hard   Food Insecurity: Food Insecurity Present (2/11/2025)    Hunger Vital Sign     Worried About Running Out of Food in the Last Year: Sometimes true     Ran Out of Food in the Last Year: Sometimes true   Transportation Needs: Unmet Transportation Needs (2/11/2025)    PRAPARE - Transportation     Lack of Transportation (Medical): No     Lack of Transportation (Non-Medical): Yes   Physical Activity: Insufficiently Active (2/11/2025)    Exercise Vital Sign     Days of Exercise per Week: 4 days     Minutes of Exercise per Session: 20 min   Stress: Stress Concern Present (2/11/2025)    [employer] - Occupational Stress Questionnaire     Feeling of Stress : To some extent   Housing Stability: High Risk (2/11/2025)    Housing Stability Vital Sign     Unable to Pay for Housing in the Last Year: Yes     Number of Times Moved in the Last Year: 0     Homeless in the Last Year: Patient declined   [2]   Current Outpatient Medications:     albuterol (PROVENTIL/VENTOLIN HFA) 90 mcg/actuation inhaler, Inhale 1-2 puffs into the lungs every 6 (six) hours as needed for Wheezing. Rescue, Disp: 54 g, Rfl: 0    amLODIPine (NORVASC) 5 MG tablet, Take 1.5 tablets (7.5 mg total) by mouth once daily., Disp: 135 tablet, Rfl: 3    aspirin 81 MG Chew, Take 81 mg by mouth once daily., Disp: , Rfl:     atorvastatin (LIPITOR) 40 MG tablet, Take 1 tablet (40 mg total) by mouth once daily., Disp: 90 tablet, Rfl: 3    clindamycin (CLEOCIN) 300 MG capsule, Take 1 capsule (300  mg total) by mouth every 8 (eight) hours., Disp: 15 capsule, Rfl: 0    diclofenac sodium (VOLTAREN) 1 % Gel, Apply 2 g topically 4 (four) times daily., Disp: 20 g, Rfl: 0    hydroCHLOROthiazide (HYDRODIURIL) 25 MG tablet, Take 1 tablet (25 mg total) by mouth once daily., Disp: 90 tablet, Rfl: 3    hydrocodone-acetaminophen 10-325mg (NORCO)  mg Tab, Take 1 tablet by mouth 3 (three) times daily as needed., Disp: , Rfl:     metoprolol succinate (TOPROL-XL) 25 MG 24 hr tablet, Take 1 tablet (25 mg total) by mouth once daily., Disp: 90 tablet, Rfl: 3    pantoprazole (PROTONIX) 40 MG tablet, Take 1 tablet (40 mg total) by mouth before breakfast., Disp: 90 tablet, Rfl: 2    triamcinolone acetonide 0.1% (KENALOG) 0.1 % ointment, Apply topically 2 (two) times daily., Disp: 80 g, Rfl: 0    valsartan (DIOVAN) 160 MG tablet, Take 1 tablet (160 mg total) by mouth once daily., Disp: 90 tablet, Rfl: 3

## 2025-08-06 NOTE — PATIENT INSTRUCTIONS
DECREASE atorvastatin to 20mg once per day. We will re-check your cholesterol in 3 months.    INCREASE metoprolol to 50mg once per day. You can take 2 of your 25mg pills until you  the new 50mg tablets.

## 2025-08-07 VITALS — SYSTOLIC BLOOD PRESSURE: 134 MMHG | DIASTOLIC BLOOD PRESSURE: 79 MMHG

## 2025-08-08 ENCOUNTER — DOCUMENTATION ONLY (OUTPATIENT)
Dept: CARDIOLOGY | Facility: CLINIC | Age: 60
End: 2025-08-08
Payer: MEDICARE

## 2025-09-02 ENCOUNTER — PATIENT MESSAGE (OUTPATIENT)
Dept: ADMINISTRATIVE | Facility: HOSPITAL | Age: 60
End: 2025-09-02
Payer: MEDICARE

## 2025-09-05 VITALS — SYSTOLIC BLOOD PRESSURE: 136 MMHG | DIASTOLIC BLOOD PRESSURE: 78 MMHG
